# Patient Record
Sex: FEMALE | Race: WHITE | NOT HISPANIC OR LATINO | Employment: UNEMPLOYED | URBAN - METROPOLITAN AREA
[De-identification: names, ages, dates, MRNs, and addresses within clinical notes are randomized per-mention and may not be internally consistent; named-entity substitution may affect disease eponyms.]

---

## 2023-03-22 LAB
ALANINE AMINOTRANSFERASE (SGPT) (U/L) IN SER/PLAS: 16 U/L (ref 7–45)
ALBUMIN (G/DL) IN SER/PLAS: 4 G/DL (ref 3.4–5)
ALKALINE PHOSPHATASE (U/L) IN SER/PLAS: 112 U/L (ref 33–136)
ANION GAP IN SER/PLAS: 14 MMOL/L (ref 10–20)
ASPARTATE AMINOTRANSFERASE (SGOT) (U/L) IN SER/PLAS: 24 U/L (ref 9–39)
BILIRUBIN TOTAL (MG/DL) IN SER/PLAS: 0.7 MG/DL (ref 0–1.2)
CALCIDIOL (25 OH VITAMIN D3) (NG/ML) IN SER/PLAS: 75 NG/ML
CALCIUM (MG/DL) IN SER/PLAS: 9.1 MG/DL (ref 8.6–10.6)
CARBON DIOXIDE, TOTAL (MMOL/L) IN SER/PLAS: 29 MMOL/L (ref 21–32)
CHLORIDE (MMOL/L) IN SER/PLAS: 100 MMOL/L (ref 98–107)
CHOLESTEROL (MG/DL) IN SER/PLAS: 196 MG/DL (ref 0–199)
CHOLESTEROL IN HDL (MG/DL) IN SER/PLAS: 60 MG/DL
CHOLESTEROL/HDL RATIO: 3.3
COBALAMIN (VITAMIN B12) (PG/ML) IN SER/PLAS: 580 PG/ML (ref 211–911)
CREATININE (MG/DL) IN SER/PLAS: 1.01 MG/DL (ref 0.5–1.05)
ERYTHROCYTE DISTRIBUTION WIDTH (RATIO) BY AUTOMATED COUNT: 16.7 % (ref 11.5–14.5)
ERYTHROCYTE MEAN CORPUSCULAR HEMOGLOBIN CONCENTRATION (G/DL) BY AUTOMATED: 31.5 G/DL (ref 32–36)
ERYTHROCYTE MEAN CORPUSCULAR VOLUME (FL) BY AUTOMATED COUNT: 107 FL (ref 80–100)
ERYTHROCYTES (10*6/UL) IN BLOOD BY AUTOMATED COUNT: 3.32 X10E12/L (ref 4–5.2)
GFR FEMALE: 54 ML/MIN/1.73M2
GLUCOSE (MG/DL) IN SER/PLAS: 68 MG/DL (ref 74–99)
HEMATOCRIT (%) IN BLOOD BY AUTOMATED COUNT: 35.5 % (ref 36–46)
HEMOGLOBIN (G/DL) IN BLOOD: 11.2 G/DL (ref 12–16)
IRON (UG/DL) IN SER/PLAS: 61 UG/DL (ref 35–150)
IRON BINDING CAPACITY (UG/DL) IN SER/PLAS: 253 UG/DL (ref 240–445)
IRON SATURATION (%) IN SER/PLAS: 24 % (ref 25–45)
LDL: 112 MG/DL (ref 0–99)
LEUKOCYTES (10*3/UL) IN BLOOD BY AUTOMATED COUNT: 4.3 X10E9/L (ref 4.4–11.3)
NRBC (PER 100 WBCS) BY AUTOMATED COUNT: 0 /100 WBC (ref 0–0)
PLATELETS (10*3/UL) IN BLOOD AUTOMATED COUNT: 250 X10E9/L (ref 150–450)
POTASSIUM (MMOL/L) IN SER/PLAS: 4.2 MMOL/L (ref 3.5–5.3)
PROTEIN TOTAL: 6.2 G/DL (ref 6.4–8.2)
SODIUM (MMOL/L) IN SER/PLAS: 139 MMOL/L (ref 136–145)
THYROTROPIN (MIU/L) IN SER/PLAS BY DETECTION LIMIT <= 0.05 MIU/L: 1.62 MIU/L (ref 0.44–3.98)
THYROXINE (T4) FREE (NG/DL) IN SER/PLAS: 1.37 NG/DL (ref 0.78–1.48)
TRIGLYCERIDE (MG/DL) IN SER/PLAS: 121 MG/DL (ref 0–149)
URATE (MG/DL) IN SER/PLAS: 4.7 MG/DL (ref 2.3–6.7)
UREA NITROGEN (MG/DL) IN SER/PLAS: 29 MG/DL (ref 6–23)
VLDL: 24 MG/DL (ref 0–40)

## 2023-03-24 LAB
ANTI-NUCLEAR ANTIBODY (ANA): NEGATIVE
RHEUMATOID FACTOR (IU/ML) IN SERUM OR PLASMA: 36 IU/ML (ref 0–15)
SEDIMENTATION RATE, ERYTHROCYTE: 38 MM/H (ref 0–30)
URATE (MG/DL) IN SER/PLAS: 4.7 MG/DL (ref 2.3–6.7)

## 2023-05-18 ENCOUNTER — HOSPITAL ENCOUNTER (OUTPATIENT)
Dept: DATA CONVERSION | Facility: HOSPITAL | Age: 88
End: 2023-05-18
Attending: ORTHOPAEDIC SURGERY | Admitting: ORTHOPAEDIC SURGERY
Payer: MEDICARE

## 2023-05-18 DIAGNOSIS — D63.1 ANEMIA IN CHRONIC KIDNEY DISEASE (CODE): ICD-10-CM

## 2023-05-18 DIAGNOSIS — Z85.3 PERSONAL HISTORY OF MALIGNANT NEOPLASM OF BREAST: ICD-10-CM

## 2023-05-18 DIAGNOSIS — N18.30 CHRONIC KIDNEY DISEASE, STAGE 3 UNSPECIFIED (MULTI): ICD-10-CM

## 2023-05-18 DIAGNOSIS — Z88.0 ALLERGY STATUS TO PENICILLIN: ICD-10-CM

## 2023-05-18 DIAGNOSIS — M21.071 VALGUS DEFORMITY, NOT ELSEWHERE CLASSIFIED, RIGHT ANKLE: ICD-10-CM

## 2023-05-18 DIAGNOSIS — M06.9 RHEUMATOID ARTHRITIS, UNSPECIFIED (MULTI): ICD-10-CM

## 2023-05-18 DIAGNOSIS — M19.071 PRIMARY OSTEOARTHRITIS, RIGHT ANKLE AND FOOT: ICD-10-CM

## 2023-05-18 DIAGNOSIS — E03.9 HYPOTHYROIDISM, UNSPECIFIED: ICD-10-CM

## 2023-05-18 DIAGNOSIS — M20.5X9 OTHER DEFORMITIES OF TOE(S) (ACQUIRED), UNSPECIFIED FOOT: ICD-10-CM

## 2023-05-18 DIAGNOSIS — I12.9 HYPERTENSIVE CHRONIC KIDNEY DISEASE WITH STAGE 1 THROUGH STAGE 4 CHRONIC KIDNEY DISEASE, OR UNSPECIFIED CHRONIC KIDNEY DISEASE: ICD-10-CM

## 2023-05-18 DIAGNOSIS — Z79.82 LONG TERM (CURRENT) USE OF ASPIRIN: ICD-10-CM

## 2023-05-18 DIAGNOSIS — K21.9 GASTRO-ESOPHAGEAL REFLUX DISEASE WITHOUT ESOPHAGITIS: ICD-10-CM

## 2023-07-27 LAB
ANION GAP IN SER/PLAS: 11 MMOL/L (ref 10–20)
C REACTIVE PROTEIN (MG/L) IN SER/PLAS: 1.01 MG/DL
CALCIUM (MG/DL) IN SER/PLAS: 9.3 MG/DL (ref 8.6–10.6)
CARBON DIOXIDE, TOTAL (MMOL/L) IN SER/PLAS: 27 MMOL/L (ref 21–32)
CHLORIDE (MMOL/L) IN SER/PLAS: 99 MMOL/L (ref 98–107)
CREATININE (MG/DL) IN SER/PLAS: 0.96 MG/DL (ref 0.5–1.05)
ERYTHROCYTE DISTRIBUTION WIDTH (RATIO) BY AUTOMATED COUNT: 16.6 % (ref 11.5–14.5)
ERYTHROCYTE MEAN CORPUSCULAR HEMOGLOBIN CONCENTRATION (G/DL) BY AUTOMATED: 33.2 G/DL (ref 32–36)
ERYTHROCYTE MEAN CORPUSCULAR VOLUME (FL) BY AUTOMATED COUNT: 103 FL (ref 80–100)
ERYTHROCYTES (10*6/UL) IN BLOOD BY AUTOMATED COUNT: 3.52 X10E12/L (ref 4–5.2)
GFR FEMALE: 57 ML/MIN/1.73M2
GLUCOSE (MG/DL) IN SER/PLAS: 86 MG/DL (ref 74–99)
HEMATOCRIT (%) IN BLOOD BY AUTOMATED COUNT: 36.4 % (ref 36–46)
HEMOGLOBIN (G/DL) IN BLOOD: 12.1 G/DL (ref 12–16)
IRON (UG/DL) IN SER/PLAS: 38 UG/DL (ref 35–150)
IRON BINDING CAPACITY (UG/DL) IN SER/PLAS: 314 UG/DL (ref 240–445)
IRON SATURATION (%) IN SER/PLAS: 12 % (ref 25–45)
LEUKOCYTES (10*3/UL) IN BLOOD BY AUTOMATED COUNT: 6.5 X10E9/L (ref 4.4–11.3)
NRBC (PER 100 WBCS) BY AUTOMATED COUNT: 0 /100 WBC (ref 0–0)
PLATELETS (10*3/UL) IN BLOOD AUTOMATED COUNT: 225 X10E9/L (ref 150–450)
POTASSIUM (MMOL/L) IN SER/PLAS: 4.4 MMOL/L (ref 3.5–5.3)
RHEUMATOID FACTOR (IU/ML) IN SERUM OR PLASMA: 40 IU/ML (ref 0–15)
SEDIMENTATION RATE, ERYTHROCYTE: 46 MM/H (ref 0–30)
SODIUM (MMOL/L) IN SER/PLAS: 133 MMOL/L (ref 136–145)
UREA NITROGEN (MG/DL) IN SER/PLAS: 34 MG/DL (ref 6–23)

## 2023-09-07 VITALS — BODY MASS INDEX: 26.42 KG/M2 | HEIGHT: 64 IN | WEIGHT: 154.76 LBS

## 2023-09-27 PROBLEM — M79.3 LIPODERMATOSCLEROSIS OF BOTH LOWER EXTREMITIES: Status: ACTIVE | Noted: 2023-09-27

## 2023-09-27 PROBLEM — I87.2 VENOUS INSUFFICIENCY: Status: ACTIVE | Noted: 2023-09-27

## 2023-09-27 LAB
ANION GAP IN SER/PLAS: 15 MMOL/L (ref 10–20)
CALCIDIOL (25 OH VITAMIN D3) (NG/ML) IN SER/PLAS: 62 NG/ML
CALCIUM (MG/DL) IN SER/PLAS: 9.2 MG/DL (ref 8.6–10.6)
CARBON DIOXIDE, TOTAL (MMOL/L) IN SER/PLAS: 23 MMOL/L (ref 21–32)
CHLORIDE (MMOL/L) IN SER/PLAS: 101 MMOL/L (ref 98–107)
COBALAMIN (VITAMIN B12) (PG/ML) IN SER/PLAS: 465 PG/ML (ref 211–911)
CREATININE (MG/DL) IN SER/PLAS: 0.99 MG/DL (ref 0.5–1.05)
FOLATE (NG/ML) IN SER/PLAS: 13.5 NG/ML
GFR FEMALE: 55 ML/MIN/1.73M2
GLUCOSE (MG/DL) IN SER/PLAS: 119 MG/DL (ref 74–99)
IRON (UG/DL) IN SER/PLAS: 62 UG/DL (ref 35–150)
IRON BINDING CAPACITY (UG/DL) IN SER/PLAS: 307 UG/DL (ref 240–445)
IRON SATURATION (%) IN SER/PLAS: 20 % (ref 25–45)
POTASSIUM (MMOL/L) IN SER/PLAS: 4.1 MMOL/L (ref 3.5–5.3)
SODIUM (MMOL/L) IN SER/PLAS: 135 MMOL/L (ref 136–145)
UREA NITROGEN (MG/DL) IN SER/PLAS: 44 MG/DL (ref 6–23)

## 2023-09-27 RX ORDER — OMEPRAZOLE 20 MG/1
20 CAPSULE, DELAYED RELEASE ORAL
COMMUNITY
End: 2024-03-05 | Stop reason: SDUPTHER

## 2023-09-27 RX ORDER — TORSEMIDE 10 MG/1
20 TABLET ORAL 2 TIMES DAILY
COMMUNITY
End: 2024-03-05 | Stop reason: SDUPTHER

## 2023-09-27 RX ORDER — GABAPENTIN 300 MG/1
300 CAPSULE ORAL
COMMUNITY
End: 2024-03-05 | Stop reason: SDUPTHER

## 2023-09-27 RX ORDER — ALLOPURINOL 100 MG/1
200 TABLET ORAL DAILY
COMMUNITY
End: 2024-04-09 | Stop reason: SDUPTHER

## 2023-09-27 RX ORDER — POTASSIUM CHLORIDE 1500 MG/1
20 TABLET, EXTENDED RELEASE ORAL DAILY
COMMUNITY
Start: 2023-03-29

## 2023-09-27 RX ORDER — TRAMADOL HYDROCHLORIDE 50 MG/1
50 TABLET ORAL EVERY 6 HOURS PRN
COMMUNITY
Start: 2016-06-03 | End: 2024-03-06 | Stop reason: ALTCHOICE

## 2023-09-27 RX ORDER — DIAZEPAM 5 MG/1
5 TABLET ORAL NIGHTLY PRN
COMMUNITY
Start: 2013-04-02

## 2023-09-30 NOTE — H&P
History & Physical Reviewed:   I have reviewed the History and Physical dated:  03-May-2023   History and Physical reviewed and relevant findings noted. Patient examined to review pertinent physical  findings.: No significant changes   Home Medications Reviewed: no changes noted   Allergies Reviewed: no changes noted       ERAS (Enhanced Recovery After Surgery):  ·  ERAS Patient: no     Consent:   COVID-19 Consent:  ·  COVID-19 Risk Consent Surgeon has reviewed key risks related to the risk of abdulkadir COVID-19 and if they contract COVID-19 what the risks are.       Electronic Signatures:  Dustin Escoto)  (Signed 17-May-2023 13:04)   Authored: History & Physical Reviewed, ERAS, Consent,  Note Completion      Last Updated: 17-May-2023 13:04 by Dustin Escoto)

## 2023-10-02 NOTE — OP NOTE
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 Right great toe IP fusion  2. Right foot PIP fusion toes 2,3,4  3. Right foot MTP capsulotomy/extensor lengthening toes 2,3,4  4.   5.   Surgeon: Arron   Resident/Fellow/Other Assistant: Matthew   Anesthesia: ga and regional   Estimated Blood Loss (mL): none   Specimen: no   Findings: see dx     Operative Report Dictated:  Dictation: not applicable - note contains Operative  Report   Operative Report:    Preop DX: Posttraumatic arthrosis/deformity right great toe IP joint; right foot second, third, fourth claw toe  Postop DX: Same  Procedure: 1.  Right great toe IP fusion 2.  Right foot second, third, fourth PIP fusion.3.  Right foot second, third, fourth MTP arthroplasty/extensor lengthening.  Surgeon: Dustin Escoto MD  Asst: Juan Carlos Castro DO  Anesthesia: General and regional  Clinical Note: 88-year-old female with symptomatic right great toe deformity and arthrosis status post fracture of the proximal phalanx.  Here for corrective fusion of the IP joint and correction of lesser claw toes.  Understood the risk of surge including  bleeding, infection, nonunion, malunion, possible need for further surgery or shoewear modifications.  Studies which wished to proceed.  Procedure Note: Patient brought to operating room after regional anesthesia.  Timeout performed.  Antibiotics given IV.  General anesthesia given.  Right leg was prepped draped Dickel sterile fashion tourniquet on the calf.  Foot was exsanguinated tourniquet  inflated to 225 mmHg.  Z incision was made over the IP joint the right great toe.  Skin was elevated off the extensor lester.  Dorsal extensor lester was excised off the IP joint.  Collateral ligaments were released.  Medial wedge resection of the distal  aspect of the proximal phalanx performed to correct the valgus deformity.  The base of the distal phalanx were  resected to good bleeding bone.  Bone was temporarily pinned in neutral position good alignment by C-arm images.  The pin was measured drilled  and a 4.0 headless screw was placed retrograde from the distal phalanx into the proximal phalanx with good fixation.  Good alignment by C-arm images.  Wounds irrigated.  Extensor tendon repaired over the joint with 0 Vicryl suture.  The skin incision  was closed with nylon suture.  Attention was turned the lesser toes.  Same procedure was performed on second, third and fourth toe.  Skin and extensor lester excised off the PIP joint dorsally.  Collateral ligaments were released.  Distal aspect the proximal  phalanx were resected.  The base of the middle phalanx was rongeured.  A long flexor release to the plantar aspect.  The toes were pinned in neutral position with the 0.62 K wire.  There is still slight extension of the MTP joints.  Percutaneous extensor  tenotomy and dorsal capsule release performed on the second third and fourth MTP joints.  The 0.62 K wires were then driven into the corresponding metatarsal heads to maintain good alignment.  These incisions were closed with nylon sutures.  Wounds washed  and dried.  Dressed with Xeroform fluffs and a soft bulky dressing.          Electronic Signatures:  Dustin Escoto)  (Signed 18-May-2023 09:47)   Authored: Post Operative Note, Note Completion      Last Updated: 18-May-2023 09:47 by Dustin Escoto)

## 2023-11-29 ENCOUNTER — LAB (OUTPATIENT)
Dept: LAB | Facility: LAB | Age: 88
End: 2023-11-29
Payer: MEDICARE

## 2023-11-29 DIAGNOSIS — D50.9 IRON DEFICIENCY ANEMIA, UNSPECIFIED IRON DEFICIENCY ANEMIA TYPE: ICD-10-CM

## 2023-11-29 DIAGNOSIS — M05.9 RHEUMATOID ARTHRITIS WITH POSITIVE RHEUMATOID FACTOR, INVOLVING UNSPECIFIED SITE (MULTI): ICD-10-CM

## 2023-11-29 DIAGNOSIS — E87.1 LOW SODIUM LEVELS: ICD-10-CM

## 2023-11-29 LAB
ANION GAP SERPL CALC-SCNC: 13 MMOL/L (ref 10–20)
BASOPHILS # BLD AUTO: 0.05 X10*3/UL (ref 0–0.1)
BASOPHILS NFR BLD AUTO: 1 %
BUN SERPL-MCNC: 36 MG/DL (ref 6–23)
CALCIUM SERPL-MCNC: 9.2 MG/DL (ref 8.6–10.6)
CHLORIDE SERPL-SCNC: 102 MMOL/L (ref 98–107)
CO2 SERPL-SCNC: 27 MMOL/L (ref 21–32)
CREAT SERPL-MCNC: 1.01 MG/DL (ref 0.5–1.05)
EOSINOPHIL # BLD AUTO: 0.1 X10*3/UL (ref 0–0.4)
EOSINOPHIL NFR BLD AUTO: 2.1 %
ERYTHROCYTE [DISTWIDTH] IN BLOOD BY AUTOMATED COUNT: 15.4 % (ref 11.5–14.5)
ERYTHROCYTE [SEDIMENTATION RATE] IN BLOOD BY WESTERGREN METHOD: 32 MM/H (ref 0–30)
FOLATE SERPL-MCNC: 13.6 NG/ML
GFR SERPL CREATININE-BSD FRML MDRD: 54 ML/MIN/1.73M*2
GLUCOSE SERPL-MCNC: 102 MG/DL (ref 74–99)
HCT VFR BLD AUTO: 33.8 % (ref 36–46)
HGB BLD-MCNC: 11.2 G/DL (ref 12–16)
IMM GRANULOCYTES # BLD AUTO: 0.02 X10*3/UL (ref 0–0.5)
IMM GRANULOCYTES NFR BLD AUTO: 0.4 % (ref 0–0.9)
IRON SATN MFR SERPL: 19 % (ref 25–45)
IRON SERPL-MCNC: 54 UG/DL (ref 35–150)
LYMPHOCYTES # BLD AUTO: 1.13 X10*3/UL (ref 0.8–3)
LYMPHOCYTES NFR BLD AUTO: 23.5 %
MCH RBC QN AUTO: 35 PG (ref 26–34)
MCHC RBC AUTO-ENTMCNC: 33.1 G/DL (ref 32–36)
MCV RBC AUTO: 106 FL (ref 80–100)
MONOCYTES # BLD AUTO: 0.62 X10*3/UL (ref 0.05–0.8)
MONOCYTES NFR BLD AUTO: 12.9 %
NEUTROPHILS # BLD AUTO: 2.88 X10*3/UL (ref 1.6–5.5)
NEUTROPHILS NFR BLD AUTO: 60.1 %
NRBC BLD-RTO: 0 /100 WBCS (ref 0–0)
PLATELET # BLD AUTO: 175 X10*3/UL (ref 150–450)
POTASSIUM SERPL-SCNC: 4.1 MMOL/L (ref 3.5–5.3)
RBC # BLD AUTO: 3.2 X10*6/UL (ref 4–5.2)
RHEUMATOID FACT SER NEPH-ACNC: 36 IU/ML (ref 0–15)
SODIUM SERPL-SCNC: 138 MMOL/L (ref 136–145)
TIBC SERPL-MCNC: 291 UG/DL (ref 240–445)
UIBC SERPL-MCNC: 237 UG/DL (ref 110–370)
WBC # BLD AUTO: 4.8 X10*3/UL (ref 4.4–11.3)

## 2023-11-29 PROCEDURE — 86431 RHEUMATOID FACTOR QUANT: CPT

## 2023-11-29 PROCEDURE — 83540 ASSAY OF IRON: CPT

## 2023-11-29 PROCEDURE — 82746 ASSAY OF FOLIC ACID SERUM: CPT

## 2023-11-29 PROCEDURE — 85652 RBC SED RATE AUTOMATED: CPT

## 2023-11-29 PROCEDURE — 80048 BASIC METABOLIC PNL TOTAL CA: CPT

## 2023-11-29 PROCEDURE — 85025 COMPLETE CBC W/AUTO DIFF WBC: CPT

## 2023-11-29 PROCEDURE — 83550 IRON BINDING TEST: CPT

## 2023-11-29 PROCEDURE — 36415 COLL VENOUS BLD VENIPUNCTURE: CPT

## 2023-12-04 ENCOUNTER — OFFICE VISIT (OUTPATIENT)
Dept: ORTHOPEDIC SURGERY | Facility: CLINIC | Age: 88
End: 2023-12-04
Payer: MEDICARE

## 2023-12-04 DIAGNOSIS — M65.322 ACQUIRED TRIGGER FINGER OF LEFT INDEX FINGER: ICD-10-CM

## 2023-12-04 DIAGNOSIS — M19.049 CMC ARTHRITIS: Primary | ICD-10-CM

## 2023-12-04 PROCEDURE — 1126F AMNT PAIN NOTED NONE PRSNT: CPT | Performed by: ORTHOPAEDIC SURGERY

## 2023-12-04 PROCEDURE — 99213 OFFICE O/P EST LOW 20 MIN: CPT | Performed by: ORTHOPAEDIC SURGERY

## 2023-12-04 PROCEDURE — 20550 NJX 1 TENDON SHEATH/LIGAMENT: CPT | Performed by: ORTHOPAEDIC SURGERY

## 2023-12-04 PROCEDURE — 20600 DRAIN/INJ JOINT/BURSA W/O US: CPT | Performed by: ORTHOPAEDIC SURGERY

## 2023-12-04 RX ORDER — LIDOCAINE HYDROCHLORIDE 10 MG/ML
0.5 INJECTION INFILTRATION; PERINEURAL
Status: COMPLETED | OUTPATIENT
Start: 2023-12-04 | End: 2023-12-04

## 2023-12-04 RX ORDER — TRIAMCINOLONE ACETONIDE 40 MG/ML
20 INJECTION, SUSPENSION INTRA-ARTICULAR; INTRAMUSCULAR
Status: COMPLETED | OUTPATIENT
Start: 2023-12-04 | End: 2023-12-04

## 2023-12-04 RX ADMIN — LIDOCAINE HYDROCHLORIDE 0.5 ML: 10 INJECTION INFILTRATION; PERINEURAL at 19:27

## 2023-12-04 RX ADMIN — LIDOCAINE HYDROCHLORIDE 0.5 ML: 10 INJECTION INFILTRATION; PERINEURAL at 19:28

## 2023-12-04 RX ADMIN — TRIAMCINOLONE ACETONIDE 20 MG: 40 INJECTION, SUSPENSION INTRA-ARTICULAR; INTRAMUSCULAR at 19:28

## 2023-12-04 RX ADMIN — TRIAMCINOLONE ACETONIDE 20 MG: 40 INJECTION, SUSPENSION INTRA-ARTICULAR; INTRAMUSCULAR at 19:27

## 2023-12-05 NOTE — PROGRESS NOTES
Michell presents today for new problem.  I last saw her in October 2022 for a trigger finger of her left middle finger.  She reports that she had a foot surgery in May which exacerbated her underlying rheumatoid arthritis.  Her primary complaint today is of pain and a bony prominence at the base of the left thumb.  She also has developed progressively symptomatic trigger fingers.  This past summer her right ring finger trigger finger was very bad but seem to have improved spontaneously and is now very tolerable.  However she has developed more significant triggering of the left hand primarily the index finger and to a lesser degree the middle finger.  She has a history of rheumatoid and osteoarthritis.  She has seen occupational therapy before who recommended arthritic gloves that she wears at nighttime while sleeping which she thinks have been helpful.  She would like to try injections today but wants to limit how many steroid she is getting because of reactions that she has had to too many steroids in the past.    Past medical history, medications, allergies, surgical history and review of systems have been reviewed with the patient. Pertinent changes are documented in the HPI. Otherwise they are unchanged when compared to last visit on October 10, 2022.    Physical Examination Findings:  Constitutional: Appears well-developed and well-nourished.  Head: Normocephalic and atraumatic.  Eyes: Pupils are equal and round.  Cardiovascular: Intact distal pulses.   Respiratory: Effort normal. No respiratory distress.  Neurologic: Alert and oriented to person, place, and time.  Skin: Skin is warm and dry.  Hematologic / Lymphatic: No lymphedema, lymphangitis.  Psychiatric: normal mood and affect. Behavior is normal.   Musculoskeletal: Bilateral hand examination reveals moderate diffuse degenerative changes.  She has triggering of her right ring finger with tenderness at the A1 pulley but no locking and no IP joint  contracture.  On the left hand she has a bony prominence at the thumb CMC joint.  She has subluxation of the CMC joint with a mild adduction contracture.  No significant MP joint hyperextension stability.  Tenderness to palpation index and middle finger A1 pulleys with triggering of these 2 digits.  The index finger triggering is more painful.  No appreciable rheumatoid nodules today.  No appreciable joint deformities other than the thumb CMC joint.    Impression: Left thumb CMC arthritis, multiple trigger fingers bilateral hands.    Plan: She has indicated that her left thumb and left index finger are the most bothersome and she has requested injections for these today.    S Inj/Asp: L thumb CMC on 12/4/2023 7:27 PM  Indications: pain  Details: 25 G needle, dorsal approach  Medications: 20 mg triamcinolone acetonide 40 mg/mL; 0.5 mL lidocaine 10 mg/mL (1 %)  Outcome: tolerated well, no immediate complications  Procedure, treatment alternatives, risks and benefits explained, specific risks discussed. Consent was given by the patient. Immediately prior to procedure a time out was called to verify the correct patient, procedure, equipment, support staff and site/side marked as required. Patient was prepped and draped in the usual sterile fashion.       Hand / UE Inj/Asp: L index A1 for trigger finger on 12/4/2023 7:28 PM  Indications: tendon swelling and pain  Details: 25 G needle, volar approach  Medications: 20 mg triamcinolone acetonide 40 mg/mL; 0.5 mL lidocaine 10 mg/mL (1 %)  Outcome: tolerated well, no immediate complications  Procedure, treatment alternatives, risks and benefits explained, specific risks discussed. Consent was given by the patient. Immediately prior to procedure a time out was called to verify the correct patient, procedure, equipment, support staff and site/side marked as required. Patient was prepped and draped in the usual sterile fashion.         Return as needed for recurrence or  progression of problems .    Alejandro Gunderson MD    [facility] [employer]  Department of Orthopaedic Surgery  Chief of Hand and Upper Extremity Surgery  [facility]    Dictation performed with the use of voice recognition software. Syntax and grammatical errors may exist.

## 2023-12-28 ENCOUNTER — APPOINTMENT (OUTPATIENT)
Dept: OBSTETRICS AND GYNECOLOGY | Facility: CLINIC | Age: 88
End: 2023-12-28
Payer: MEDICARE

## 2024-01-05 ENCOUNTER — APPOINTMENT (OUTPATIENT)
Dept: ORTHOPEDIC SURGERY | Facility: CLINIC | Age: 89
End: 2024-01-05
Payer: MEDICARE

## 2024-01-18 ENCOUNTER — OFFICE VISIT (OUTPATIENT)
Dept: OBSTETRICS AND GYNECOLOGY | Facility: CLINIC | Age: 89
End: 2024-01-18
Payer: MEDICARE

## 2024-01-18 DIAGNOSIS — N39.0 RECURRENT UTI (URINARY TRACT INFECTION): Primary | ICD-10-CM

## 2024-01-18 PROCEDURE — 1126F AMNT PAIN NOTED NONE PRSNT: CPT | Performed by: OBSTETRICS & GYNECOLOGY

## 2024-01-18 PROCEDURE — 99214 OFFICE O/P EST MOD 30 MIN: CPT | Performed by: OBSTETRICS & GYNECOLOGY

## 2024-01-18 RX ORDER — LEVOTHYROXINE SODIUM 50 UG/1
50 TABLET ORAL
COMMUNITY
End: 2024-04-05 | Stop reason: WASHOUT

## 2024-01-18 NOTE — PROGRESS NOTES
Urogynecology  Provider:  Alexa Gross MD  [phone]              ASSESSMENT AND PLAN:     Problem List Items Addressed This Visit    None          I spent a total of eConsult Time: 30 minutes in face to face and non face to face time.        Alexa Gross MD  HISTORY OF PRESENT ILLNESS    Michell Max a 88 y.o. 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 Authorizing Provider   allopurinol (Zyloprim) 100 mg tablet Take 1 tablet (100 mg) by mouth 2 times a day.    Historical Provider, MD   diazePAM (Valium) 5 mg tablet Take 1 tablet (5 mg) by mouth as needed at bedtime. 4/2/13   Historical Provider, MD   gabapentin (Neurontin) 300 mg capsule Take 1 capsule (300 mg) by mouth.    Historical Provider, MD   omeprazole (PriLOSEC) 20 mg DR capsule Take 1 capsule (20 mg) by mouth once daily in the morning. Take before meals.    Historical Provider, MD   potassium chloride CR 20 mEq ER tablet Take 1 tablet (20 mEq) by  "mouth once daily. 3/29/23   Historical Provider, MD   torsemide (Demadex) 10 mg tablet Take 2 tablets (20 mg) by mouth 2 times a day.    Historical Provider, MD   traMADol (Ultram) 50 mg tablet Take 1 tablet (50 mg) by mouth every 6 hours if needed. 6/3/16   Historical Provider, MD BULLARD  Review of Systems     Blood, Urine   Date Value Ref Range Status   02/14/2023 SMALL (1+) (A) NEGATIVE Final     Nitrite, Urine   Date Value Ref Range Status   02/14/2023 NEGATIVE NEGATIVE Final     Urobilinogen, Urine   Date Value Ref Range Status   02/14/2023 <2.0 0.0 - 1.9 mg/dL Final         PHYSICAL EXAM:      There were no vitals taken for this visit.     No LMP recorded.      Declines chaperone for physical exam.      Well developed, well nourished, in no apparent distress.   Neurologic/Psychiatric:  Awake, Alert and Oriented times 3.  Affect normal.     GENITAL/URINARY:       External Genitalia:  The patient has normal appearing external genitalia, normal skenes and bartholins glands, and a normal hair distribution.  Her vulva is without lesions, erythema or discharge.  It is non-tender with appropriate sensation.     Urethral Meatus:  Size normal, Location normal, Lesions absent, Prolapse absent,      Urethra:  Fullness absent, Masses absent,      Bladder:  Fullness absent, Masses absent, Tenderness absent,      Vagina:  General appearance normal, Estrogen effect normal, Discharge absent, Lesions absent     Cervix: Normal, no discharge.   Uterus:  surgically absent  Adnexa: normal     Anus/Perineum:  Lesions absent and Masses absent normal sphincter tone, no lesions  Normal Perineum    POP-Q:  Aa: -1       Ba:  C: -8   Gh:  Pb:  TVL: 9         Ap: -3 Bp:  D: x           No results found for: \"URINECULTURE\"   Lab Results   Component Value Date    GLUCOSE 102 (H) 11/29/2023    CALCIUM 9.2 11/29/2023     11/29/2023    K 4.1 11/29/2023    CO2 27 11/29/2023     11/29/2023    BUN 36 (H) 11/29/2023    CREATININE " 1.01 11/29/2023     Lab Results   Component Value Date    WBC 4.8 11/29/2023    HGB 11.2 (L) 11/29/2023    HCT 33.8 (L) 11/29/2023     (H) 11/29/2023     11/29/2023      ASSESSMENT &PLAN     Assessment:   88 y.o. old female being assessed for Cystocele and Jennifer's     Diagnoses:   #1 Cystocele  #2 Recurrent UTI     Plan:   1. Recurrent UTI  - Continue use of TV estrogen cream every 10 days      2. Cystocele  - Minimal prolapse, reassured patient  - Prolapse stable and asymptomatic  - We will continue to monitor      Follow-up in September 2024 with Dr. Renato Alonzo Attestation  By signing my name below, I, Nikki Edmonds , Scr[place]   attest that this documentation has been prepared under the direction and in the presence of Alexa Gross MD.     Alexa Gross MD      Overall doing well. No scute issues.  I Dr. Gross, personally performed the services described in the documentation which was scribed virtually and confirm it is both complete and accurate.  Alexa Gross MD

## 2024-02-23 ENCOUNTER — APPOINTMENT (OUTPATIENT)
Dept: ORTHOPEDIC SURGERY | Facility: CLINIC | Age: 89
End: 2024-02-23
Payer: MEDICARE

## 2024-03-01 ENCOUNTER — APPOINTMENT (OUTPATIENT)
Dept: ORTHOPEDIC SURGERY | Facility: CLINIC | Age: 89
End: 2024-03-01
Payer: MEDICARE

## 2024-03-05 DIAGNOSIS — R52 PAIN: Primary | ICD-10-CM

## 2024-03-05 DIAGNOSIS — K21.9 GASTROESOPHAGEAL REFLUX DISEASE, UNSPECIFIED WHETHER ESOPHAGITIS PRESENT: ICD-10-CM

## 2024-03-05 RX ORDER — GABAPENTIN 300 MG/1
300 CAPSULE ORAL NIGHTLY
Qty: 90 CAPSULE | Refills: 3 | Status: SHIPPED | OUTPATIENT
Start: 2024-03-05

## 2024-03-05 RX ORDER — TORSEMIDE 10 MG/1
20 TABLET ORAL 2 TIMES DAILY
Qty: 180 TABLET | Refills: 3 | Status: SHIPPED | OUTPATIENT
Start: 2024-03-05

## 2024-03-05 RX ORDER — OMEPRAZOLE 20 MG/1
20 CAPSULE, DELAYED RELEASE ORAL
Qty: 90 CAPSULE | Refills: 3 | Status: SHIPPED | OUTPATIENT
Start: 2024-03-05

## 2024-03-06 ENCOUNTER — OFFICE VISIT (OUTPATIENT)
Dept: CARDIOLOGY | Facility: CLINIC | Age: 89
End: 2024-03-06
Payer: MEDICARE

## 2024-03-06 VITALS
HEART RATE: 100 BPM | SYSTOLIC BLOOD PRESSURE: 138 MMHG | WEIGHT: 149 LBS | DIASTOLIC BLOOD PRESSURE: 72 MMHG | RESPIRATION RATE: 16 BRPM | BODY MASS INDEX: 25.44 KG/M2 | HEIGHT: 64 IN

## 2024-03-06 DIAGNOSIS — I87.2 VENOUS INSUFFICIENCY: Primary | ICD-10-CM

## 2024-03-06 PROCEDURE — 99213 OFFICE O/P EST LOW 20 MIN: CPT | Performed by: INTERNAL MEDICINE

## 2024-03-06 PROCEDURE — 1157F ADVNC CARE PLAN IN RCRD: CPT | Performed by: INTERNAL MEDICINE

## 2024-03-06 PROCEDURE — 1036F TOBACCO NON-USER: CPT | Performed by: INTERNAL MEDICINE

## 2024-03-06 PROCEDURE — 1159F MED LIST DOCD IN RCRD: CPT | Performed by: INTERNAL MEDICINE

## 2024-03-06 PROCEDURE — 1160F RVW MEDS BY RX/DR IN RCRD: CPT | Performed by: INTERNAL MEDICINE

## 2024-03-06 PROCEDURE — 1126F AMNT PAIN NOTED NONE PRSNT: CPT | Performed by: INTERNAL MEDICINE

## 2024-03-06 RX ORDER — ERGOCALCIFEROL 1.25 MG/1
1.25 CAPSULE ORAL
COMMUNITY
End: 2024-04-09 | Stop reason: SDUPTHER

## 2024-03-06 RX ORDER — FERROUS SULFATE, DRIED 160(50) MG
1 TABLET, EXTENDED RELEASE ORAL DAILY
COMMUNITY

## 2024-03-06 RX ORDER — METHOTREXATE 2.5 MG/1
17.5 TABLET ORAL
COMMUNITY
End: 2024-04-09 | Stop reason: SDUPTHER

## 2024-03-06 RX ORDER — FERROUS SULFATE 325(65) MG
65 TABLET, DELAYED RELEASE (ENTERIC COATED) ORAL
COMMUNITY

## 2024-03-06 RX ORDER — LUTEIN 6 MG
1 TABLET ORAL DAILY
COMMUNITY

## 2024-03-06 RX ORDER — ACETAMINOPHEN 500 MG
1000 TABLET ORAL DAILY
COMMUNITY

## 2024-03-06 ASSESSMENT — PAIN SCALES - GENERAL: PAINLEVEL: 0-NO PAIN

## 2024-03-06 ASSESSMENT — PATIENT HEALTH QUESTIONNAIRE - PHQ9
1. LITTLE INTEREST OR PLEASURE IN DOING THINGS: NOT AT ALL
2. FEELING DOWN, DEPRESSED OR HOPELESS: NOT AT ALL
SUM OF ALL RESPONSES TO PHQ9 QUESTIONS 1 AND 2: 0

## 2024-03-06 ASSESSMENT — COLUMBIA-SUICIDE SEVERITY RATING SCALE - C-SSRS
2. HAVE YOU ACTUALLY HAD ANY THOUGHTS OF KILLING YOURSELF?: NO
1. IN THE PAST MONTH, HAVE YOU WISHED YOU WERE DEAD OR WISHED YOU COULD GO TO SLEEP AND NOT WAKE UP?: NO
6. HAVE YOU EVER DONE ANYTHING, STARTED TO DO ANYTHING, OR PREPARED TO DO ANYTHING TO END YOUR LIFE?: NO

## 2024-03-06 NOTE — PROGRESS NOTES
OUTPATIENT FOLLOW-UP -  VASCULAR MEDICINE    DOS:  3/6/24  Last seen:    8/30/23    REQUESTING PHYSICIAN:   Dr. Zack Nieves, PCP    REASON FOR FOLLOW-UP:  Here for follow up CVI and LDS    HISTORY OF PRESENT ILLNESS:     87 yo lady here for follow up CVI and LDS. Reports using the compression. Used the tubigrip/edema wear for a while but doesn't need it now. When las seen had foot and ankle surgery and was still undergoing PT. This is better now. Has ortho follow up next week. Reports has some tendonitis interfering with walking.      REVIEW OF SYSTEMS:     weight is stable  No sores, ulcers, rashes, skin lesions  + edema, no calf pain    PHYSICAL EXAMINATION:   Gen: Appears well except pale, NAD  Ext: trace brawny edema right, nontender  Skin: LDS and hemosiderin staining kush R>>L  Pulses: DP 1+  Mood and affect appropriate    ADDITIONAL DATA:  15-20mmHg compression worn  right lorena  32.0cm  left calf:  31.0cm      ASSESSMENT/PLAN:    for follow up CVI and LDS - continue the compression. Elevate at night. Walk every hour if possible. Follow up 6 months.

## 2024-03-06 NOTE — PATIENT INSTRUCTIONS
ASSESSMENT/PLAN:    for follow up CVI and LDS - continue the compression. Elevate at night. Walk every hour if possible. Follow up 6 months.

## 2024-03-13 ENCOUNTER — HOSPITAL ENCOUNTER (OUTPATIENT)
Dept: RADIOLOGY | Facility: CLINIC | Age: 89
Discharge: HOME | End: 2024-03-13
Payer: MEDICARE

## 2024-03-13 ENCOUNTER — OFFICE VISIT (OUTPATIENT)
Dept: ORTHOPEDIC SURGERY | Facility: CLINIC | Age: 89
End: 2024-03-13
Payer: MEDICARE

## 2024-03-13 VITALS — WEIGHT: 149 LBS | BODY MASS INDEX: 25.44 KG/M2 | HEIGHT: 64 IN

## 2024-03-13 DIAGNOSIS — M19.071 OSTEOARTHRITIS OF RIGHT SUBTALAR JOINT: Primary | ICD-10-CM

## 2024-03-13 DIAGNOSIS — M21.41 ACQUIRED PES PLANUS OF RIGHT FOOT: ICD-10-CM

## 2024-03-13 DIAGNOSIS — M79.671 FOOT PAIN, RIGHT: ICD-10-CM

## 2024-03-13 DIAGNOSIS — M21.6X1 ACQUIRED PRONATION DEFORMITY OF RIGHT FOOT: ICD-10-CM

## 2024-03-13 PROCEDURE — 73630 X-RAY EXAM OF FOOT: CPT | Mod: RT

## 2024-03-13 PROCEDURE — 99213 OFFICE O/P EST LOW 20 MIN: CPT | Performed by: STUDENT IN AN ORGANIZED HEALTH CARE EDUCATION/TRAINING PROGRAM

## 2024-03-13 PROCEDURE — 73610 X-RAY EXAM OF ANKLE: CPT | Mod: RIGHT SIDE | Performed by: STUDENT IN AN ORGANIZED HEALTH CARE EDUCATION/TRAINING PROGRAM

## 2024-03-13 PROCEDURE — 1157F ADVNC CARE PLAN IN RCRD: CPT | Performed by: STUDENT IN AN ORGANIZED HEALTH CARE EDUCATION/TRAINING PROGRAM

## 2024-03-13 PROCEDURE — 1125F AMNT PAIN NOTED PAIN PRSNT: CPT | Performed by: STUDENT IN AN ORGANIZED HEALTH CARE EDUCATION/TRAINING PROGRAM

## 2024-03-13 PROCEDURE — 1160F RVW MEDS BY RX/DR IN RCRD: CPT | Performed by: STUDENT IN AN ORGANIZED HEALTH CARE EDUCATION/TRAINING PROGRAM

## 2024-03-13 PROCEDURE — 73610 X-RAY EXAM OF ANKLE: CPT | Mod: RT

## 2024-03-13 PROCEDURE — 73630 X-RAY EXAM OF FOOT: CPT | Mod: RIGHT SIDE | Performed by: STUDENT IN AN ORGANIZED HEALTH CARE EDUCATION/TRAINING PROGRAM

## 2024-03-13 PROCEDURE — 1036F TOBACCO NON-USER: CPT | Performed by: STUDENT IN AN ORGANIZED HEALTH CARE EDUCATION/TRAINING PROGRAM

## 2024-03-13 PROCEDURE — 1159F MED LIST DOCD IN RCRD: CPT | Performed by: STUDENT IN AN ORGANIZED HEALTH CARE EDUCATION/TRAINING PROGRAM

## 2024-03-13 ASSESSMENT — PAIN SCALES - GENERAL: PAINLEVEL_OUTOF10: 3

## 2024-03-13 ASSESSMENT — PAIN - FUNCTIONAL ASSESSMENT: PAIN_FUNCTIONAL_ASSESSMENT: 0-10

## 2024-03-13 ASSESSMENT — PAIN DESCRIPTION - DESCRIPTORS: DESCRIPTORS: SHARP

## 2024-03-13 NOTE — PROGRESS NOTES
ORTHOPAEDIC SURGERY OUTPATIENT PROGRESS NOTE    Chief Complaint:  Right ankle pain    History Of Present Illness  Michell Medrano is a 88 y.o. female who presents for evaluation of right ankle pain.  Patient  underwent right great toe IPJ arthrodesis and claw toe corrective surgery with Dr. Escoto from May 2023.  She has done well from the surgery.  Even before this surgery and over the last year she has had pain on the outside of her foot.  She has intermittently utilized walking boots for more severe pain which has resolved in the past.  She has followed with one of our local podiatrists Dr. Dockery and continues with him for nail care.  She has had success with custom orthotics and is due for a new pair.  Patient ambulates with a walker at baseline, she is not interested in pursuing surgical management for this problem.  She describes 3 out of 10 pain that is sharp and made worse with walking and standing.  She denies any specific trauma or associated numbness, tingling weakness.  Patient has had flatfeet for as long as she can recall without obvious progression recently.  She notices that her shoes tend to wear out over time in a predictable fashion.     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    Generalized osteoarthritis       Surgical History  Recent Surgeries in Orthopaedic Surgery            No cases to display             Social History  Social History     Socioeconomic History    Marital status:      Spouse name: None    Number of children: None    Years of education: None    Highest education level: None   Occupational History    None   Tobacco Use    Smoking status: Never    Smokeless tobacco: Never   Substance and Sexual Activity    Alcohol use: None    Drug use: None    Sexual activity: None    Other Topics Concern    None   Social History Narrative    None     Social Determinants of Health     Financial Resource Strain: Not on file   Food Insecurity: Not on file   Transportation Needs: Not on file   Physical Activity: Not on file   Stress: Not on file   Social Connections: Not on file   Intimate Partner Violence: Not on file   Housing Stability: Not on file       Family History  No family history on file.     Allergies  Allergies   Allergen Reactions    Cephalosporins Unknown    Doxycycline Unknown    Erythromycin Unknown    Erythromycin Base Unknown    Methen-M.Blue-S.Phos-Phsal-Hyo Unknown     Uribel CAPS    Naproxen Unknown    Penicillins Unknown    Prednisone Unknown    Pseudoephedrine Unknown    Tetracycline Unknown       Review of Systems  REVIEW OF SYSTEMS  Constitutional: no unplanned weight loss  Psychiatric: no suicidal ideation  ENT: no vision changes, no sinus problems  Pulmonary: no shortness of breath  Lymphatic: no enlarged lymph nodes  Cardiovascular: no chest pain or shortness of breath  Gastrointestinal: no stomach problems  Genitourinary: no dysuria   Skin: no rashes  Endocrine: no thyroid problems  Neurological: no headache, no numbness  Hematological: no easy bruising  Musculoskeletal: Right foot pain     Physical Exam  PHYSICAL EXAMINATION  Constitutional Exam: well developed and well nourished  Psychiatric Exam: alert and oriented, appropriate mood and behavior  Eye Exam: EOMI  Pulmonary Exam: breathing non-labored, no apparent distress  Lymphatic exam: no appreciable lymphadenopathy in the lower extremities  Cardiovascular exam: RRR to peripheral palpation, DP pulses 2+, PT 2+, toes are pink with good capillary refill, no pitting edema  Skin exam: no open lesions, rashes, abrasions or ulcerations  Neurological exam: sensation to light touch intact in both lower extremities in peripheral and dermatomal distributions (except for any abnormalities noted in musculoskeletal  "exam)    Musculoskeletal exam: Right lower extremity examination.  Patient pain localized to the sinus Tarsi.  She is focally tender to palpation there.  She has mild tenderness to palpation overlying the superficial deltoid medially.  There is no obvious ankle effusion or tenderness to palpation along the joint line.  Patient without tenderness to palpation about the IPJ or significant tenderness to palpation about the lesser toes.  Patient has greater than physiologic hindfoot valgus with pes planus arch posture and no significant forefoot deformity noted except for claw toe and fourth curly toe with relative varus deviation.  Patient has relatively pain-free ankle ROM with painful and restricted subtalar joint range of motion and supple and pain-free midtarsal joint range of motion. Patient has sensation intact to light touch grossly in a saphenous, sural, superficial peroneal, deep peroneal and tibial nerve distribution.  Patient has 5 out of 5 strength in plantarflexion, dorsiflexion and EHL. Patient has 2+ DP/PT pulse palpated.     Last Recorded Vitals  Height 1.626 m (5' 4\"), weight 67.6 kg (149 lb).    Laboratory Results  No results found for this or any previous visit (from the past 24 hour(s)).     Radiology Results  X-ray imaging 3 view weightbearing right foot and ankle reviewed from 03/13/2024 and independently evaluated by me demonstrates obvious healing of arthrodesis of first IPJ  and sequela of prior claw toe and claw toe corrective procedure with residual deformity.  There are obvious degenerative change of the tibiotalar and subtalar joint with mild increase sclerosis of the tibial plafond.  Lateral projection suggests pes planus posture.    Assessment/Plan:  88-year-old female who my impression has right progressive collapsing foot deformity with features of pes planus posture, acquired pronation deformity and obvious subtalar osteoarthritis.  I have reviewed the diagnosis and treatment options " extensively with the patient.  In my impression the patient may continue weightbearing as tolerated in her right lower extremity.  She is planning to obtain new  in the coming weeks.  I discussed the role for bracing including [place] style brace.  The patient has previously poorly tolerated bracing due to ill fit in her shoes.  We discussed the diagnostic and empiric benefits of a right ST CSI, I will plan to see the patient back in approximately 3 months for repeat clinical evaluation.  I anticipate discussion regarding I would recommend deferring to assess her pain following obtaining new .  R ST CSI as symptoms would warrant.  Upon return, patient would not require further imaging.    Kingston Farrell MD, ROSIO  Department of Orthopaedic Surgery  [facility]    The diagnosis and treatment plan were reviewed with the patient. All questions were answered. The patient verbalized understanding of the treatment plan. There were no barriers to understanding identified.    Note dictated with MyPermissions software.  Completed without full type editing and sent to avoid delay.

## 2024-03-25 ENCOUNTER — LAB REQUISITION (OUTPATIENT)
Dept: LAB | Facility: LAB | Age: 89
End: 2024-03-25
Payer: MEDICARE

## 2024-03-25 DIAGNOSIS — M25.562 PAIN IN LEFT KNEE: ICD-10-CM

## 2024-03-25 DIAGNOSIS — D64.9 ANEMIA, UNSPECIFIED: ICD-10-CM

## 2024-03-25 DIAGNOSIS — E55.9 VITAMIN D DEFICIENCY, UNSPECIFIED: ICD-10-CM

## 2024-03-25 DIAGNOSIS — R31.9 HEMATURIA, UNSPECIFIED: ICD-10-CM

## 2024-03-25 DIAGNOSIS — M81.0 AGE-RELATED OSTEOPOROSIS WITHOUT CURRENT PATHOLOGICAL FRACTURE: ICD-10-CM

## 2024-03-25 DIAGNOSIS — I10 ESSENTIAL (PRIMARY) HYPERTENSION: ICD-10-CM

## 2024-03-25 DIAGNOSIS — M10.9 GOUT, UNSPECIFIED: ICD-10-CM

## 2024-03-25 LAB
25(OH)D3 SERPL-MCNC: 74 NG/ML (ref 30–100)
APPEARANCE UR: CLEAR
BILIRUB UR STRIP.AUTO-MCNC: NEGATIVE MG/DL
COLOR UR: COLORLESS
ERYTHROCYTE [DISTWIDTH] IN BLOOD BY AUTOMATED COUNT: 15.8 % (ref 11.5–14.5)
ERYTHROCYTE [SEDIMENTATION RATE] IN BLOOD BY WESTERGREN METHOD: 33 MM/H (ref 0–30)
FOLATE SERPL-MCNC: 10.6 NG/ML
GLUCOSE UR STRIP.AUTO-MCNC: NORMAL MG/DL
HCT VFR BLD AUTO: 35 % (ref 36–46)
HGB BLD-MCNC: 11.7 G/DL (ref 12–16)
KETONES UR STRIP.AUTO-MCNC: NEGATIVE MG/DL
LEUKOCYTE ESTERASE UR QL STRIP.AUTO: NEGATIVE
MCH RBC QN AUTO: 36.1 PG (ref 26–34)
MCHC RBC AUTO-ENTMCNC: 33.4 G/DL (ref 32–36)
MCV RBC AUTO: 108 FL (ref 80–100)
NITRITE UR QL STRIP.AUTO: NEGATIVE
NRBC BLD-RTO: 0 /100 WBCS (ref 0–0)
PH UR STRIP.AUTO: 6.5 [PH]
PLATELET # BLD AUTO: 242 X10*3/UL (ref 150–450)
PROT UR STRIP.AUTO-MCNC: NEGATIVE MG/DL
RBC # BLD AUTO: 3.24 X10*6/UL (ref 4–5.2)
RBC # UR STRIP.AUTO: NEGATIVE /UL
SP GR UR STRIP.AUTO: 1
T4 FREE SERPL-MCNC: 1.45 NG/DL (ref 0.78–1.48)
TSH SERPL-ACNC: 2.51 MIU/L (ref 0.44–3.98)
UROBILINOGEN UR STRIP.AUTO-MCNC: NORMAL MG/DL
WBC # BLD AUTO: 4.8 X10*3/UL (ref 4.4–11.3)

## 2024-03-25 PROCEDURE — 85652 RBC SED RATE AUTOMATED: CPT

## 2024-03-25 PROCEDURE — 83550 IRON BINDING TEST: CPT

## 2024-03-25 PROCEDURE — 84550 ASSAY OF BLOOD/URIC ACID: CPT

## 2024-03-25 PROCEDURE — 81003 URINALYSIS AUTO W/O SCOPE: CPT

## 2024-03-25 PROCEDURE — 87086 URINE CULTURE/COLONY COUNT: CPT

## 2024-03-25 PROCEDURE — 80053 COMPREHEN METABOLIC PANEL: CPT

## 2024-03-25 PROCEDURE — 82306 VITAMIN D 25 HYDROXY: CPT

## 2024-03-25 PROCEDURE — 86431 RHEUMATOID FACTOR QUANT: CPT

## 2024-03-25 PROCEDURE — 84443 ASSAY THYROID STIM HORMONE: CPT

## 2024-03-25 PROCEDURE — 85027 COMPLETE CBC AUTOMATED: CPT

## 2024-03-25 PROCEDURE — 84439 ASSAY OF FREE THYROXINE: CPT

## 2024-03-25 PROCEDURE — 82746 ASSAY OF FOLIC ACID SERUM: CPT

## 2024-03-25 PROCEDURE — 83540 ASSAY OF IRON: CPT

## 2024-03-25 PROCEDURE — 80061 LIPID PANEL: CPT

## 2024-03-26 LAB
ALBUMIN SERPL BCP-MCNC: 4.2 G/DL (ref 3.4–5)
ALP SERPL-CCNC: 113 U/L (ref 33–136)
ALT SERPL W P-5'-P-CCNC: 17 U/L (ref 7–45)
ANION GAP SERPL CALC-SCNC: 16 MMOL/L (ref 10–20)
AST SERPL W P-5'-P-CCNC: 26 U/L (ref 9–39)
BACTERIA UR CULT: NORMAL
BILIRUB SERPL-MCNC: 0.9 MG/DL (ref 0–1.2)
BUN SERPL-MCNC: 30 MG/DL (ref 6–23)
CALCIUM SERPL-MCNC: 9.3 MG/DL (ref 8.6–10.6)
CHLORIDE SERPL-SCNC: 101 MMOL/L (ref 98–107)
CHOLEST SERPL-MCNC: 220 MG/DL (ref 0–199)
CHOLESTEROL/HDL RATIO: 3.2
CO2 SERPL-SCNC: 26 MMOL/L (ref 21–32)
CREAT SERPL-MCNC: 0.98 MG/DL (ref 0.5–1.05)
EGFRCR SERPLBLD CKD-EPI 2021: 56 ML/MIN/1.73M*2
GLUCOSE SERPL-MCNC: 82 MG/DL (ref 74–99)
HDLC SERPL-MCNC: 69.8 MG/DL
IRON SATN MFR SERPL: 30 % (ref 25–45)
IRON SERPL-MCNC: 86 UG/DL (ref 35–150)
LDLC SERPL CALC-MCNC: 127 MG/DL
NON HDL CHOLESTEROL: 150 MG/DL (ref 0–149)
POTASSIUM SERPL-SCNC: 3.8 MMOL/L (ref 3.5–5.3)
PROT SERPL-MCNC: 6.4 G/DL (ref 6.4–8.2)
RHEUMATOID FACT SER NEPH-ACNC: 31 IU/ML (ref 0–15)
SODIUM SERPL-SCNC: 139 MMOL/L (ref 136–145)
TIBC SERPL-MCNC: 290 UG/DL (ref 240–445)
TRIGL SERPL-MCNC: 114 MG/DL (ref 0–149)
UIBC SERPL-MCNC: 204 UG/DL (ref 110–370)
URATE SERPL-MCNC: 4.7 MG/DL (ref 2.3–6.7)
VLDL: 23 MG/DL (ref 0–40)

## 2024-04-05 DIAGNOSIS — M05.69: ICD-10-CM

## 2024-04-05 DIAGNOSIS — M10.9 GOUT, UNSPECIFIED CAUSE, UNSPECIFIED CHRONICITY, UNSPECIFIED SITE: Primary | ICD-10-CM

## 2024-04-05 DIAGNOSIS — E55.9 VITAMIN D DEFICIENCY: ICD-10-CM

## 2024-04-05 RX ORDER — LEVOTHYROXINE SODIUM 50 UG/1
50 TABLET ORAL
COMMUNITY

## 2024-04-09 RX ORDER — ALLOPURINOL 100 MG/1
200 TABLET ORAL DAILY
Qty: 90 TABLET | Refills: 3 | Status: SHIPPED | OUTPATIENT
Start: 2024-04-09

## 2024-04-09 RX ORDER — ERGOCALCIFEROL 1.25 MG/1
1.25 CAPSULE ORAL
Qty: 24 CAPSULE | Refills: 1 | Status: SHIPPED | OUTPATIENT
Start: 2024-04-09

## 2024-04-09 RX ORDER — METHOTREXATE 2.5 MG/1
2.5 TABLET ORAL
Qty: 12 TABLET | Refills: 3 | Status: SHIPPED | OUTPATIENT
Start: 2024-04-09 | End: 2024-04-11 | Stop reason: SDUPTHER

## 2024-04-11 RX ORDER — METHOTREXATE 2.5 MG/1
20 TABLET ORAL
Qty: 96 TABLET | Refills: 3 | Status: SHIPPED | OUTPATIENT
Start: 2024-04-14

## 2024-05-29 DIAGNOSIS — M65.342 TRIGGER RING FINGER OF LEFT HAND: ICD-10-CM

## 2024-05-29 DIAGNOSIS — M65.332 TRIGGER MIDDLE FINGER OF LEFT HAND: ICD-10-CM

## 2024-06-12 ENCOUNTER — HOSPITAL ENCOUNTER (OUTPATIENT)
Facility: HOSPITAL | Age: 89
Setting detail: OUTPATIENT SURGERY
Discharge: HOME | End: 2024-06-12
Attending: ORTHOPAEDIC SURGERY | Admitting: ORTHOPAEDIC SURGERY
Payer: MEDICARE

## 2024-06-12 VITALS
BODY MASS INDEX: 24.8 KG/M2 | HEART RATE: 73 BPM | RESPIRATION RATE: 20 BRPM | OXYGEN SATURATION: 97 % | TEMPERATURE: 97.5 F | DIASTOLIC BLOOD PRESSURE: 75 MMHG | SYSTOLIC BLOOD PRESSURE: 167 MMHG | HEIGHT: 64 IN | WEIGHT: 145.28 LBS

## 2024-06-12 DIAGNOSIS — M65.342 TRIGGER RING FINGER OF LEFT HAND: Primary | ICD-10-CM

## 2024-06-12 DIAGNOSIS — M65.332 TRIGGER MIDDLE FINGER OF LEFT HAND: ICD-10-CM

## 2024-06-12 PROCEDURE — 7100000010 HC PHASE TWO TIME - EACH INCREMENTAL 1 MINUTE: Performed by: ORTHOPAEDIC SURGERY

## 2024-06-12 PROCEDURE — 26055 INCISE FINGER TENDON SHEATH: CPT | Performed by: ORTHOPAEDIC SURGERY

## 2024-06-12 PROCEDURE — 3600000003 HC OR TIME - INITIAL BASE CHARGE - PROCEDURE LEVEL THREE: Performed by: ORTHOPAEDIC SURGERY

## 2024-06-12 PROCEDURE — 7100000009 HC PHASE TWO TIME - INITIAL BASE CHARGE: Performed by: ORTHOPAEDIC SURGERY

## 2024-06-12 PROCEDURE — 2500000005 HC RX 250 GENERAL PHARMACY W/O HCPCS: Performed by: ORTHOPAEDIC SURGERY

## 2024-06-12 PROCEDURE — 3600000008 HC OR TIME - EACH INCREMENTAL 1 MINUTE - PROCEDURE LEVEL THREE: Performed by: ORTHOPAEDIC SURGERY

## 2024-06-12 RX ORDER — BUPIVACAINE HYDROCHLORIDE AND EPINEPHRINE 5; 5 MG/ML; UG/ML
INJECTION, SOLUTION EPIDURAL; INTRACAUDAL; PERINEURAL AS NEEDED
Status: DISCONTINUED | OUTPATIENT
Start: 2024-06-12 | End: 2024-06-12 | Stop reason: HOSPADM

## 2024-06-12 RX ORDER — LIDOCAINE HYDROCHLORIDE 10 MG/ML
INJECTION INFILTRATION; PERINEURAL AS NEEDED
Status: DISCONTINUED | OUTPATIENT
Start: 2024-06-12 | End: 2024-06-12 | Stop reason: HOSPADM

## 2024-06-12 RX ORDER — HYDROCODONE BITARTRATE AND ACETAMINOPHEN 5; 325 MG/1; MG/1
1 TABLET ORAL EVERY 6 HOURS PRN
Qty: 12 TABLET | Refills: 0 | Status: SHIPPED | OUTPATIENT
Start: 2024-06-12 | End: 2024-06-15

## 2024-06-12 ASSESSMENT — PAIN SCALES - GENERAL
PAINLEVEL_OUTOF10: 1
PAINLEVEL_OUTOF10: 1

## 2024-06-12 ASSESSMENT — PAIN DESCRIPTION - DESCRIPTORS: DESCRIPTORS: ACHING

## 2024-06-12 ASSESSMENT — PAIN - FUNCTIONAL ASSESSMENT
PAIN_FUNCTIONAL_ASSESSMENT: 0-10
PAIN_FUNCTIONAL_ASSESSMENT: 0-10

## 2024-06-12 NOTE — OP NOTE
Left Middle and Ring Finger Trigger Release / 25 Minutes (L) Operative Note     Date: 2024  OR Location: [facility]    Name: Michell Medrano, : 1935, Age: 89 y.o., MRN: [id number], Sex: female    Diagnosis  Pre-op Diagnosis     * Trigger middle finger of left hand [M65.332]     * Trigger ring finger of left hand [M65.342] Post-op Diagnosis     * Trigger middle finger of left hand [M65.332]     * Trigger ring finger of left hand [M65.342]     Procedures  Left Middle and Ring Finger Trigger Release / 25 Minutes  22242 - PA TENDON SHEATH INCISION      Surgeons      * Alejandro Gunderson - Primary    Resident/Fellow/Other Assistant:  Surgeons and Role:  * No surgeons found with a matching role *    Procedure Summary  Anesthesia: Local  ASA: ASA status not filed in the log.  Anesthesia Staff: No anesthesia staff entered.  Estimated Blood Loss: 2 mL  Intra-op Medications:   Administrations occurring from 1155 to 1240 on 24:   Medication Name Total Dose   BUPivacaine-EPINEPHrine (PF) (Marcaine w/EPI) 0.5 %-1:200,000 injection 2.75 mL   lidocaine (Xylocaine) 10 mg/mL (1 %) injection 2.75 mL              Anesthesia Record               Intraprocedure I/O Totals       None           Specimen: No specimens collected     Staff:   Circulator: Betzaida Canalesub Person: Jamaal         Drains and/or Catheters: * None in log *    Tourniquet Times:         Implants:     Findings: Left middle and ring finger trigger finger    Indications: Michell Medrano is an 89 y.o. female who is having surgery for Trigger middle finger of left hand [M65.332]  Trigger ring finger of left hand [M65.342].      The patient was seen in the preoperative area. The risks, benefits, complications, treatment options, non-operative alternatives, expected recovery and outcomes were discussed with the patient. The possibilities of reaction to medication, pulmonary aspiration, injury to surrounding structures, bleeding, recurrent infection, the need for  additional procedures, failure to diagnose a condition, and creating a complication requiring transfusion or operation were discussed with the patient. The patient concurred with the proposed plan, giving informed consent.  The site of surgery was properly noted/marked if necessary per policy. The patient has been actively warmed in preoperative area. Preoperative antibiotics are not indicated. Venous thrombosis prophylaxis are not indicated.    Procedure Details:   89-year-old female symptomatic trigger finger left middle and ring finger with only temporary relief with steroid injections presents today for trigger finger release surgery.  Preoperatively the left middle and ring finger were identified and marked for surgery.  Informed consent process was completed.    Patient was brought to the operating and placed supine on the operating table.  A timeout procedure was performed to verify the correct patient procedure and operative site.  We then injected local anesthetic into the palm overlying the planned surgical incision.  This was a combination of 0.5% Marcaine with epinephrine and 1% lidocaine taking care to avoid administering this medication too close to the neurovascular bundles.    Left upper extremity was prepped and draped in usual sterile fashion.  We then made an incision in the distal palmar crease to expose the middle and ring finger flexor tendon sheath.  There was some small amounts of oozing from the cut skin edges.  We then exposed the ring finger flexor sheath.  The sheath was incised and the A1 pulley was divided longitudinally.  The palmar pulley was also released.  In a similar fashion we exposed to the middle finger flexor sheath.  The sheath was incised and the A1 pulley was divided longitudinally.  The palmar pulley was also released.  Patient was then able to demonstrate full active finger flexion and extension without triggering.  The wound was copiously irrigated and then closed in  interrupted fashion.  A sterile bandage was applied.  The patient was transferred to the recovery in stable condition.    Postoperatively she will be discharged home once comfortable.  She can remove her bandage on postop day #4 and begin wound care with gentle activities as instructed.  Return to clinic in 2 weeks for wound check and advancement of her activities.        Complications:  None; patient tolerated the procedure well.    Disposition: PACU - hemodynamically stable.  Condition: stable         Additional Details:      Attending Attestation: I was present and scrubbed for the entire procedure.    Alejandro Gunderson  Phone Number: [phone]

## 2024-06-12 NOTE — H&P
History Of Present Illness  Michell Medrano is a 89 y.o. female presenting with left middle and ring finger trigger finger.     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 She has never used smokeless tobacco. She reports that she does not currently use alcohol. She reports that she does not use drugs.    Family History  No family history on file.     Allergies  Doxycycline, Erythromycin, Erythromycin base, Methen-m.blue-s.phos-phsal-hyo, Naproxen, Penicillins, Prednisone, Pseudoephedrine, Tetracycline, Cephalosporins, and Red dye    Review of Systems   All other systems reviewed and are negative.       Physical Exam  Physical Examination Findings:  Constitutional: Appears well-developed and well-nourished.  Head: Normocephalic and atraumatic.  Eyes: Pupils are equal and round.  Cardiovascular: Intact distal pulses.   Respiratory: Effort normal. No respiratory distress.  Neurologic: Alert and oriented to person, place, and time.  Skin: Skin is warm and dry.  Hematologic / Lympahtic: No lymphedema, lymphangitis.  Psychiatric: normal mood and affect. Behavior is normal.   Musculoskeletal: Left middle and ring finger trigger finger      Last Recorded Vitals  Blood pressure 167/75, pulse 73, temperature 36.4 °C (97.5 °F), temperature source Tympanic, resp. rate  "20, height 1.626 m (5' 4\"), weight 65.9 kg (145 lb 4.5 oz), SpO2 97%.     Assessment/Plan   Active Problems:    Trigger middle finger of left hand    Trigger ring finger of left hand      Will proceed with left middle and ring finger trigger release as scheduled       Alejandro Gunderson MD    "

## 2024-06-12 NOTE — DISCHARGE INSTRUCTIONS
Post-Operative Instructions  Dr. Alejandro Gunderson [phone]    Dressing:  You have a bandage or splint covering your operative site.    You may remove the dressing in 4  days following surgery. Once your dressing is removed you may shower and/or wash your incision in a sink with soap and water. Do not soak your incision. No bath tubs, hot tubs or swimming pools. After washing the wound please pat the incision dry thoroughly. Please use mild soap. Please do not use hydrogen peroxide. Please cover the wound with a band-aid or gauze and change it daily. Please do not apply any salves, creams, lotions or ointments to the surgical incision. Otherwise keep incision clean and dry (no yard work, engine work, etc.)    Showering:  You may shower following surgery but please adhere to above instructions regarding the dressing. If showering with bandage/splint in place please ensure that it is kept dry and covered while bathing. There are commercially available cast bags that can be used to protect your dressing while showering. If using garbage bags please make sure that there are no holes in the bag and that the bag has been sealed above the dressing. If the bandage gets wet you must contact your surgeon's office to make arrangements to be seen to have the bandage changed.     Ice/Elevation:  The application of ice to your surgical site after surgery will help with pain control and swelling. This can be very effective for 48-72 hours after surgery. Please be careful to avoid getting bandage wet from a leaky ice bag. Please be advised that the ice may need to be applied for longer periods of time for the cooling effect to penetrate the post-operative dressing.     Elevation of the operative site above the level of the heart as much as possible for the first 48-72 hours after surgery. Use your sling if you have been provided one while standing or walking. If your fingers are not included in the dressing you are encouraged to  attempt finger range of motion as this will help with your hand swelling and ultimate recovery.    Pain Medication:  If you received a regional anesthetic on the day of your surgery your arm and hand may be numb for up to 24 hours after surgery. It is important to wear your sling while the block is still effective in order to protect your arm. It is advisable to take pain medications prior to going to sleep in case the regional anesthesia medication wears off while you are sleeping.    Take your pain medications as directed. Narcotic pain medications can cause lethargy, nausea and constipation. Please contact your surgeon's office and discontinue the medication if these symptoms become problematic. Eating a regular diet, drinking fluids and walking can help with constipation from these medications. Avoid alcohol consumption and driving while taking narcotic pain medications.     Additional pain control options:     You are encouraged to take over the counter medications like Advil / Motrin / Ibuprofen / Aleve in addition to your prescribed pain medications after surgery.    Smoking/Tobacco:  Tobacco use is known to interfere with wound and fracture healing and increase post-operative pain. It can also increase your risk of poor outcomes following surgery. Please do not use tobacco or nicotine products following your surgery. This includes smokeless tobacco, or nicotine replacement products (patches, gum, etc.).    Driving:  It is not advisable to operate a vehicle while using narcotic pain medications. Additionally, please be advised that you are likely to have challenges operating a car or motorcycle while you are still in your postoperative dressing and this could increase your risk of being involved in an accident and being cited for driving while physically impaired.     Warning Signs:  Observe your arm/hand and incision site (if visible) for increased redness, inflammation, drainage, odor or pain that is  unrelieved by rest, elevation or medication. Please contact your surgeon's office immediately if you develop any of these issues or if you develop a fever greater than 101°.    Follow Up Appointments:  Your post-operative appointment has been scheduled for  6/24/24 10:45 am with Lisa High    [facility], [address] [place] [address] Rd[address] [place], [place], [address]

## 2024-06-13 ENCOUNTER — APPOINTMENT (OUTPATIENT)
Dept: ORTHOPEDIC SURGERY | Facility: CLINIC | Age: 89
End: 2024-06-13
Payer: MEDICARE

## 2024-06-24 ENCOUNTER — APPOINTMENT (OUTPATIENT)
Dept: ORTHOPEDIC SURGERY | Facility: CLINIC | Age: 89
End: 2024-06-24
Payer: MEDICARE

## 2024-06-24 VITALS — WEIGHT: 145 LBS | HEIGHT: 64 IN | BODY MASS INDEX: 24.75 KG/M2

## 2024-06-24 DIAGNOSIS — M65.342 TRIGGER RING FINGER OF LEFT HAND: Primary | ICD-10-CM

## 2024-06-24 DIAGNOSIS — M65.332 TRIGGER MIDDLE FINGER OF LEFT HAND: ICD-10-CM

## 2024-06-24 PROCEDURE — 99024 POSTOP FOLLOW-UP VISIT: CPT | Performed by: PHYSICIAN ASSISTANT

## 2024-06-24 PROCEDURE — 1157F ADVNC CARE PLAN IN RCRD: CPT | Performed by: PHYSICIAN ASSISTANT

## 2024-06-24 PROCEDURE — 1036F TOBACCO NON-USER: CPT | Performed by: PHYSICIAN ASSISTANT

## 2024-06-24 ASSESSMENT — PAIN DESCRIPTION - DESCRIPTORS: DESCRIPTORS: ACHING;SORE

## 2024-06-24 ASSESSMENT — PAIN - FUNCTIONAL ASSESSMENT: PAIN_FUNCTIONAL_ASSESSMENT: 0-10

## 2024-06-24 ASSESSMENT — PAIN SCALES - GENERAL: PAINLEVEL_OUTOF10: 5 - MODERATE PAIN

## 2024-06-24 NOTE — PROGRESS NOTES
Patient presents to clinic today for first postoperative visit after trigger finger release of the middle and ring finger performed on 6/12/2024.  Overall she is doing well.  She has noticed some issues with wound healing.  Notices some stiffness specifically at the middle finger.  Denies any fevers or chills.    Examination: Surgical incision with some mild wound dehiscence.  No surrounding erythema active drainage or purulent drainage no signs of any active infection.  Slight limitations to full terminal finger flexion of the middle digit.  Fingers are warm and well-perfused.    Impression: Left known ring trigger finger    Plan: We discussed appropriate wound care.  She may continue washing the hand with soap and water.  She should apply Vaseline over the wound as well as a Band-Aid she may leave it uncovered for several hours.  She may continue to work on range of motion.  At this time she would like to hold off on any therapy given her discomfort.  We will have her return to our office in 2 to 3 weeks for wound check at that time if she is still having issues we will get her set up with occupational therapy.    Patient was also seen and examined by Dr. Gunderson who also participated in treatment course and plan.    Lisa High PA-C  Department of Orthopaedic Surgery  [facility]    Dictation performed with the use of voice recognition software. Syntax and grammatical errors may exist.

## 2024-07-08 DIAGNOSIS — M05.69: ICD-10-CM

## 2024-07-08 RX ORDER — METHOTREXATE 2.5 MG/1
20 TABLET ORAL
Qty: 96 TABLET | Refills: 3 | Status: SHIPPED | OUTPATIENT
Start: 2024-07-08

## 2024-07-09 ENCOUNTER — APPOINTMENT (OUTPATIENT)
Dept: ORTHOPEDIC SURGERY | Facility: CLINIC | Age: 89
End: 2024-07-09
Payer: MEDICARE

## 2024-07-15 ENCOUNTER — APPOINTMENT (OUTPATIENT)
Dept: ORTHOPEDIC SURGERY | Facility: CLINIC | Age: 89
End: 2024-07-15
Payer: MEDICARE

## 2024-07-15 VITALS — BODY MASS INDEX: 24.75 KG/M2 | WEIGHT: 145 LBS | HEIGHT: 64 IN

## 2024-07-15 DIAGNOSIS — M65.332 TRIGGER MIDDLE FINGER OF LEFT HAND: Primary | ICD-10-CM

## 2024-07-15 PROCEDURE — 1157F ADVNC CARE PLAN IN RCRD: CPT | Performed by: PHYSICIAN ASSISTANT

## 2024-07-15 PROCEDURE — 1036F TOBACCO NON-USER: CPT | Performed by: PHYSICIAN ASSISTANT

## 2024-07-15 PROCEDURE — 99024 POSTOP FOLLOW-UP VISIT: CPT | Performed by: PHYSICIAN ASSISTANT

## 2024-07-15 ASSESSMENT — PAIN SCALES - GENERAL: PAINLEVEL_OUTOF10: 5 - MODERATE PAIN

## 2024-07-15 ASSESSMENT — PAIN - FUNCTIONAL ASSESSMENT: PAIN_FUNCTIONAL_ASSESSMENT: 0-10

## 2024-07-15 ASSESSMENT — PAIN DESCRIPTION - DESCRIPTORS: DESCRIPTORS: ACHING;TENDER;SORE

## 2024-07-15 NOTE — PROGRESS NOTES
Doing presents to clinic today for second postoperative visit and wound check after trigger finger release of the left middle and ring fingers performed on 6/12/2024.  She has noticed improvement of her healing however still having some tenderness.  Complains of some mild stiffness and swelling when waking up in the morning.  She has been working on massage.    Examination: Well-healed surgical incision.  Mild focal swelling around incision site no signs of any active infection good digital finger range of motion with slight limitations to full terminal finger flexion of the middle digit.  Fingers are warm and well-perfused.    Impression: Left hand middle and ring trigger finger    Plan: Overall her wound looks well-healed.  We have discussed scar tissue massage techniques and range of motion activity.  She does have occupational therapy at her skilled nursing facility.  I have given her referral today if she is still having issues I would recommend her getting into therapy.  She may use the hand as tolerated without restrictions.  Return to our office as needed.    Lisa High PA-C  Department of Orthopaedic Surgery  [facility]    Dictation performed with the use of voice recognition software. Syntax and grammatical errors may exist.

## 2024-07-17 ENCOUNTER — APPOINTMENT (OUTPATIENT)
Dept: ORTHOPEDIC SURGERY | Facility: CLINIC | Age: 89
End: 2024-07-17
Payer: MEDICARE

## 2024-08-06 ENCOUNTER — APPOINTMENT (OUTPATIENT)
Dept: OCCUPATIONAL THERAPY | Facility: CLINIC | Age: 89
End: 2024-08-06
Payer: MEDICARE

## 2024-08-08 ENCOUNTER — OFFICE VISIT (OUTPATIENT)
Dept: ORTHOPEDIC SURGERY | Facility: CLINIC | Age: 89
End: 2024-08-08
Payer: MEDICARE

## 2024-08-08 DIAGNOSIS — M19.071 OSTEOARTHRITIS OF RIGHT SUBTALAR JOINT: Primary | ICD-10-CM

## 2024-08-08 DIAGNOSIS — M21.41 ACQUIRED PES PLANUS OF RIGHT FOOT: ICD-10-CM

## 2024-08-08 DIAGNOSIS — M21.6X1 ACQUIRED PRONATION DEFORMITY OF RIGHT FOOT: ICD-10-CM

## 2024-08-08 PROCEDURE — 99212 OFFICE O/P EST SF 10 MIN: CPT | Performed by: STUDENT IN AN ORGANIZED HEALTH CARE EDUCATION/TRAINING PROGRAM

## 2024-08-08 ASSESSMENT — PAIN DESCRIPTION - DESCRIPTORS: DESCRIPTORS: ACHING;SORE;DULL;DISCOMFORT

## 2024-08-08 ASSESSMENT — PAIN SCALES - GENERAL: PAINLEVEL_OUTOF10: 3

## 2024-08-08 ASSESSMENT — PAIN - FUNCTIONAL ASSESSMENT: PAIN_FUNCTIONAL_ASSESSMENT: 0-10

## 2024-08-08 NOTE — PROGRESS NOTES
ORTHOPAEDIC SURGERY OUTPATIENT PROGRESS NOTE    Chief Complaint:  Right ankle pain    History Of Present Illness  Michell Medrano is a 89 y.o. female who presents for evaluation of right ankle pain.  Patient  underwent right great toe IPJ arthrodesis and claw toe corrective surgery with Dr. Escoto from May 2023.  She has done well from the surgery.  Even before this surgery and over the last year she has had pain on the outside of her foot.  She has intermittently utilized walking boots for more severe pain which has resolved in the past.  She has followed with one of our local podiatrists Dr. Dockery and continues with him for nail care.  She has had success with custom orthotics and is due for a new pair.  Patient ambulates with a walker at baseline, she is not interested in pursuing surgical management for this problem.  She describes 3 out of 10 pain that is sharp and made worse with walking and standing.  She denies any specific trauma or associated numbness, tingling weakness.  Patient has had flatfeet for as long as she can recall without obvious progression recently.  She notices that her shoes tend to wear out over time in a predictable fashion.     08/08/2024: Patient returns for later than expected follow-up of her right ankle pain.  She underwent hand surgery from which she is recovering well.  She is reporting overall improved pain.  Currently rated as 3 out of 10, this suddenly worsened last Friday.  Patient does believe that she overdid it on Thursday.  She has obtained custom fabricated orthotics which worsened her pain.  Pain is made worse overall with walking and standing.  Although she is encumbered by her pain, she is satisfied with the improvement she has had overall.  She continues to ambulate with the use of a walker at baseline.    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     Allergies  Allergies   Allergen Reactions    Doxycycline Unknown    Erythromycin Unknown    Erythromycin Base Unknown    Methen-M.Blue-S.Phos-Phsal-Hyo Unknown     Uribel CAPS    Naproxen Unknown    Penicillins Unknown    Prednisone Unknown    Pseudoephedrine Unknown    Tetracycline Unknown    Cephalosporins Hives    Red Dye Rash       Review of Systems  REVIEW OF SYSTEMS  Constitutional: no unplanned weight loss  Psychiatric: no suicidal ideation  ENT: no vision changes, no sinus problems  Pulmonary: no shortness of breath  Lymphatic: no enlarged lymph nodes  Cardiovascular: no chest pain or shortness of breath  Gastrointestinal: no stomach problems  Genitourinary: no dysuria   Skin: no rashes  Endocrine: no thyroid problems  Neurological: no headache, no numbness  Hematological: no easy bruising  Musculoskeletal: Right foot pain     Physical Exam  PHYSICAL EXAMINATION  Constitutional Exam: well developed and well nourished  Psychiatric Exam: alert and oriented, appropriate mood and behavior  Eye Exam: EOMI  Pulmonary Exam: breathing non-labored, no apparent distress  Lymphatic exam: no appreciable lymphadenopathy in the lower extremities  Cardiovascular exam: RRR to peripheral palpation, DP pulses 2+, PT 2+, toes are pink with good capillary refill, no pitting edema  Skin exam: no open lesions, rashes, abrasions or ulcerations  Neurological exam: sensation to light touch intact  in both lower extremities in peripheral and dermatomal distributions (except for any abnormalities noted in musculoskeletal exam)    Musculoskeletal exam: Right lower extremity examination.  Patient typical pain continues to localize to the sinus Tarsi.  She is focally tender to palpation there.  No obvious tenderness to palpation overlying the deltoid.  There is no ankle effusion.  Patient has greater than physiologic hindfoot valgus with pes planus arch posture and no significant forefoot deformity noted except for claw toe and fourth curly toe with relative varus deviation.  Patient has pain-free and supple ankle range of motion with painful and severely restricted subtalar joint range of motion as well as pain-free and supple midtarsal joint range of motion. Patient has sensation intact to light touch grossly in a saphenous, sural, superficial peroneal, deep peroneal and tibial nerve distribution.  Patient has 5 out of 5 strength in plantarflexion, dorsiflexion and EHL. Patient has 2+ DP/PT pulse palpated.     Last Recorded Vitals  There were no vitals taken for this visit.    Laboratory Results  No results found for this or any previous visit (from the past 24 hour(s)).     Radiology Results  No new imaging at this visit.    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  I have reviewed the diagnosis and treatment options extensively with the patient.  I would recommend the patient continue weightbearing to her tolerance in her right lower extremity.  Based on rereview of her radiographs and current level of symptoms, I suspect it would be unlikely for her to have significant pain relief from a R ST CSI and so I would not recommend one at this time.  The patient is considering having a new 3D printed orthotic made, I have encouraged her to bring her  previous orthotics to the new orthotic maker to ensure that they would meet her specifications.  She typically wears SAS shoes with some arch support that aid in her symptoms overall.  I again reviewed with the patient that if she were to fail an exhaustive and appropriate course of nonoperative treatment that she could ultimately consider a right flatfoot reconstruction through an arthrodesis approach, however based on her current symptoms I would not recommend this surgical approach at this time.  I will plan to see the patient back on an as-needed basis.  I encouraged the patient to contact the office if she develops any new pain, worsening symptoms or if she has any further questions.    Kingston Farrell MD, ROSIO  Department of Orthopaedic Surgery  [facility] [place]    The diagnosis and treatment plan were reviewed with the patient. All questions were answered. The patient verbalized understanding of the treatment plan. There were no barriers to understanding identified.    Note dictated with [employer] software.  Completed without full type editing and sent to avoid delay.

## 2024-08-08 NOTE — LETTER
August 8, 2024     No Recipients    Patient: Michell Medrano   YOB: 1935   Date of Visit: 8/8/2024       Dear Dr. Martin Recipients:    Thank you for referring Michell Medrano to me for evaluation. Below are my notes for this consultation.  If you have questions, please do not hesitate to call me. I look forward to following your patient along with you.       Sincerely,     Kingston Farrell MD      CC: No Recipients  ______________________________________________________________________________________    ORTHOPAEDIC SURGERY OUTPATIENT PROGRESS NOTE    Chief Complaint:  Right ankle pain    History Of Present Illness  Michell Medrano is a 89 y.o. female who presents for evaluation of right ankle pain.  Patient  underwent right great toe IPJ arthrodesis and claw toe corrective surgery with Dr. Escoto from May 2023.  She has done well from the surgery.  Even before this surgery and over the last year she has had pain on the outside of her foot.  She has intermittently utilized walking boots for more severe pain which has resolved in the past.  She has followed with one of our local podiatrists Dr. Dockery and continues with him for nail care.  She has had success with custom orthotics and is due for a new pair.  Patient ambulates with a walker at baseline, she is not interested in pursuing surgical management for this problem.  She describes 3 out of 10 pain that is sharp and made worse with walking and standing.  She denies any specific trauma or associated numbness, tingling weakness.  Patient has had flatfeet for as long as she can recall without obvious progression recently.  She notices that her shoes tend to wear out over time in a predictable fashion.     08/08/2024: Patient returns for later than expected follow-up of her right ankle pain.  She underwent hand surgery from which she is recovering well.  She is reporting overall improved pain.  Currently rated as 3 out of 10, this suddenly worsened  last Friday.  Patient does believe that she overdid it on Thursday.  She has obtained custom fabricated orthotics which worsened her pain.  Pain is made worse overall with walking and standing.  Although she is encumbered by her pain, she is satisfied with the improvement she has had overall.  She continues to ambulate with the use of a walker at baseline.    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     Allergies  Allergies   Allergen Reactions   • Doxycycline Unknown   • Erythromycin Unknown   • Erythromycin Base Unknown   • Methen-M.Blue-S.Phos-Phsal-Hyo Unknown     Uribel CAPS   • Naproxen Unknown   • Penicillins Unknown   • Prednisone Unknown   • Pseudoephedrine Unknown   • Tetracycline Unknown   • Cephalosporins Hives   • Red Dye Rash       Review of Systems  REVIEW OF SYSTEMS  Constitutional: no unplanned weight loss  Psychiatric: no suicidal ideation  ENT: no vision changes, no sinus problems  Pulmonary: no shortness of breath  Lymphatic: no enlarged lymph nodes  Cardiovascular: no chest pain or shortness of breath  Gastrointestinal: no stomach problems  Genitourinary: no dysuria   Skin: no rashes  Endocrine: no thyroid problems  Neurological: no headache, no numbness  Hematological: no easy bruising  Musculoskeletal: Right foot pain     Physical  Exam  PHYSICAL EXAMINATION  Constitutional Exam: well developed and well nourished  Psychiatric Exam: alert and oriented, appropriate mood and behavior  Eye Exam: EOMI  Pulmonary Exam: breathing non-labored, no apparent distress  Lymphatic exam: no appreciable lymphadenopathy in the lower extremities  Cardiovascular exam: RRR to peripheral palpation, DP pulses 2+, PT 2+, toes are pink with good capillary refill, no pitting edema  Skin exam: no open lesions, rashes, abrasions or ulcerations  Neurological exam: sensation to light touch intact in both lower extremities in peripheral and dermatomal distributions (except for any abnormalities noted in musculoskeletal exam)    Musculoskeletal exam: Right lower extremity examination.  Patient typical pain continues to localize to the sinus Tarsi.  She is focally tender to palpation there.  No obvious tenderness to palpation overlying the deltoid.  There is no ankle effusion.  Patient has greater than physiologic hindfoot valgus with pes planus arch posture and no significant forefoot deformity noted except for claw toe and fourth curly toe with relative varus deviation.  Patient has pain-free and supple ankle range of motion with painful and severely restricted subtalar joint range of motion as well as pain-free and supple midtarsal joint range of motion. Patient has sensation intact to light touch grossly in a saphenous, sural, superficial peroneal, deep peroneal and tibial nerve distribution.  Patient has 5 out of 5 strength in plantarflexion, dorsiflexion and EHL. Patient has 2+ DP/PT pulse palpated.     Last Recorded Vitals  There were no vitals taken for this visit.    Laboratory Results  No results found for this or any previous visit (from the past 24 hour(s)).     Radiology Results  No new imaging at this visit.    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  I have reviewed the diagnosis and treatment options extensively with the patient.  I would recommend the patient continue weightbearing to her tolerance in her right lower extremity.  Based on rereview of her radiographs and current level of symptoms, I suspect it would be unlikely for her to have significant pain relief from a R ST CSI and so I would not recommend one at this time.  The patient is considering having a new 3D printed orthotic made, I have encouraged her to bring her previous orthotics to the new orthotic maker to ensure that they would meet her specifications.  She typically wears SAS shoes with some arch support that aid in her symptoms overall.  I again reviewed with the patient that if she were to fail an exhaustive and appropriate course of nonoperative treatment that she could ultimately consider a right flatfoot reconstruction through an arthrodesis approach, however based on her current symptoms I would not recommend this surgical approach at this time.  I will plan to see the patient back on an as-needed basis.  I encouraged the patient to contact the office if she develops any new pain, worsening symptoms or if she has any further questions.    Kingston Farrell MD, ROSIO  Department of Orthopaedic Surgery  [facility]    The diagnosis and treatment plan were reviewed with the patient. All questions were answered. The patient verbalized understanding of the treatment plan. There were no barriers to understanding identified.    Note dictated with An[employer] software.  Completed without full type editing and sent to avoid delay.

## 2024-08-16 ENCOUNTER — EVALUATION (OUTPATIENT)
Dept: OCCUPATIONAL THERAPY | Facility: CLINIC | Age: 89
End: 2024-08-16
Payer: MEDICARE

## 2024-08-16 DIAGNOSIS — M65.332 TRIGGER MIDDLE FINGER OF LEFT HAND: ICD-10-CM

## 2024-08-16 PROCEDURE — 97165 OT EVAL LOW COMPLEX 30 MIN: CPT | Mod: GO | Performed by: OCCUPATIONAL THERAPIST

## 2024-08-16 PROCEDURE — 97112 NEUROMUSCULAR REEDUCATION: CPT | Mod: GO | Performed by: OCCUPATIONAL THERAPIST

## 2024-08-16 NOTE — PROGRESS NOTES
"    Occupational Therapy Orthopedic Evaluation    Patient Name: Michell Medrano  MRN: [id number]  Today's Date: 8/16/2024  Time Calculation  Start Time: 1100  Stop Time: 1135  Time Calculation (min): 35 min    Insurance:  Visit number: 1 of [place]  Authorization info: none required  Insurance Type: Medicare and [place] supplement  Medicare certification  -  Start: 8/16/24  End:  10/11/24    Current Problem  1. Trigger middle finger of left hand  Referral to Occupational Therapy    Follow Up In Occupational Therapy          Referred by: Lisa High Pa-C  Referred for: L MF/R trigger release  DOS: 6/12/24    Fall risk:  Low  Precautions:  None     Medical History Form: Reviewed (scanned into chart)    Subjective   Chief Complaint: Pain on medial lateral aspect of RF and MF PIPJ     Hand Dominance: Right    Pain:  2-3/10  Location: medial lateral aspect of RF and MF PIPJ   Description: nerve pain  Aggravating Factors:   morning stiffness pain and active use  Relieving Factors:  arthritis gloves    Previous Interventions/Treatments: None    Prior Level of Function (PLOF)  Patient previously mod I with all ADLs/IADLs    ADL/IADL impairments  Dressing and Hygiene/Grooming    Patients Living Environment: Reviewed and no concern  L[facility] with: at [facility]  Primary Language: English  Patient's Goal(s) for Therapy: \"improve strength and reduce finger pain\"    Red Flags: Do you have any of the following? No  Fever/chills, unexplained weight changes, dizziness/fainting, unexplained change in bowel or bladder functions, unexplained malaise or muscle weakness, night pain/sweats, numbness or tingling    Objective   HAND STRENGTH (Lbs)   R L   Dynamometer  20 15       LEFT HAND AROM (Degrees)   MCP PIP DIP SHELL DPC   IF         MF 0/80 0/105 0/60     RF 0/90 0/105 0/65     SF            Outcome Measures:         QUICK DASH:      Home Program:  Exercise Sets/Reps Comments   AROM tendon gliding     Desensitization   "   Scar massage     Heat                                     Treatment Today  - OT eval completed  - Neuro re-ed:   - ASL AROM within fluidotherapy for desensitization and re-ed of digital proprioception x 10min    EDUCATION: Home exercise program, plan of care, activity modifications, joint protection, pain management, and injury pathology       Assessment:   Pt is 89 year old female who underwent L MF/RF trigger finger releas  and is presenting today for evaluation with complaints of decreased strength, decreased ROM, and increased pain.  As a result of these impairments pt is having difficulty with upper body/lower body bathing and dressing tasks, difficulty with hair care tasks, and is having difficulty with home management and meal prep tasks.  Without skilled intervention patient is at risk for further loss of strength, reduced ADL/IADL function, and is at risk for requiring caregiver assistance for self care completion.  Patient to benefit from skilled services to address the impairments found in order to return to independent prior level of function.      Level of Complexity  LOW complexity    OT Rehab Potential  Excellent      Plan:     Planned Interventions include: therapeutic exercise, self-care home management, manual therapy, therapeutic activities, , neuromuscular coordination, vasopneumatic, dry needling, and orthosis fabrication.  Frequency: 1 x Week  Duration: 8 Weeks    Goals: Set and discussed today         1) Patient will demo at least 5 pounds improved left  strength for weightbearing tolerance on her walker, in 4 weeks.         2) Patient well report no more than 2/10 L finger pain with gross grasping, in 4 weeks         3) Patient will correctly return demo of desensitization techniques to ensure carryover to home, in 4 weeks      Plan of care was developed with input and agreement by the patient.      Ne Bush, OT

## 2024-08-20 ENCOUNTER — TREATMENT (OUTPATIENT)
Dept: OCCUPATIONAL THERAPY | Facility: CLINIC | Age: 89
End: 2024-08-20
Payer: MEDICARE

## 2024-08-20 DIAGNOSIS — M65.332 TRIGGER MIDDLE FINGER OF LEFT HAND: ICD-10-CM

## 2024-08-20 PROCEDURE — 97535 SELF CARE MNGMENT TRAINING: CPT | Mod: GO | Performed by: OCCUPATIONAL THERAPIST

## 2024-08-20 PROCEDURE — 97140 MANUAL THERAPY 1/> REGIONS: CPT | Mod: GO | Performed by: OCCUPATIONAL THERAPIST

## 2024-08-20 PROCEDURE — 97112 NEUROMUSCULAR REEDUCATION: CPT | Mod: GO | Performed by: OCCUPATIONAL THERAPIST

## 2024-08-20 ASSESSMENT — ACTIVITIES OF DAILY LIVING (ADL): HOME_MANAGEMENT_TIME_ENTRY: 15

## 2024-08-20 NOTE — PROGRESS NOTES
Occupational Therapy Orthopedic Evaluation    Patient Name: Michell Medrano  MRN: [id number]  Today's Date: 8/20/2024  Time Calculation  Start Time: 1102  Stop Time: 1142  Time Calculation (min): 40 min    Insurance:  Visit number: 2 of [place]  Authorization info: none required  Insurance Type: Medicare and Med [place] supplement  Medicare certification  -  Start: 8/16/24  End:  10/11/24    Current Problem  1. Trigger middle finger of left hand  Follow Up In Occupational Therapy          Referred by: Lisa High Pa-C  Referred for: L MF/R trigger release  DOS: 6/12/24    Fall risk:  Low  Precautions:  None     Medical History Form: Reviewed (scanned into chart)    Subjective   Patient reports she holds her phone with her left hand frequently. Patient reports she is performing desensitization at home  Hand Dominance: Right    Pain:  2-3/10  Location: medial lateral aspect of RF and MF PIPJ   Description: nerve pain    Objective   HAND STRENGTH (Lbs)   R L   Dynamometer  20 15       LEFT HAND AROM (Degrees)   MCP PIP DIP SHELL DPC   IF         MF 0/80 0/105 0/60     RF 0/90 0/105 0/65     SF            Outcome Measures:         QUICK DASH:      Home Program:  Exercise Sets/Reps Comments   AROM tendon gliding     Desensitization     Scar massage     Heat                                     Treatment Today  Neuro re-ed:   - ASL AROM within fluidotherapy for desensitization and re-ed of digital proprioception x 10min    Manual:   -scar massage to palmar scar to increase scar elasticity performed x 10 min    Self-care:   - Patient educated to avoid repetitive strain to left hand from holding phone for prolonged periods to decrease shear stress to palmar soft tissues x 15 min      EDUCATION: Home exercise program, plan of care, activity modifications, joint protection, pain management, and injury pathology.   Assessment:   Patient tolerated OT treatment well. Patient demonstrates improved scar mobility and is demonstrating  good ROM with no reports of triggering in MF or RF. However, patient reports triggering in IF. Patient educated to avoid repetitive strain to hand from prolonged gripping of phone to decrease shear forces to palmar soft tissues. Patient is independent with HEP. Progressing well toward goals.    Level of Complexity  LOW complexity    OT Rehab Potential  Excellent      Plan:     Planned Interventions include: therapeutic exercise, self-care home management, manual therapy, therapeutic activities, , neuromuscular coordination, vasopneumatic, dry needling, and orthosis fabrication.  Frequency: 1 x Week  Duration: 8 Weeks    Goals: Set and discussed today         1) Patient will demo at least 5 pounds improved left  strength for weightbearing tolerance on her walker, in 4 weeks.         2) Patient well report no more than 2/10 L finger pain with gross grasping, in 4 weeks         3) Patient will correctly return demo of desensitization techniques to ensure carryover to home, in 4 weeks      Plan of care was developed with input and agreement by the patient.      Ne Bush, OT

## 2024-08-27 ENCOUNTER — TREATMENT (OUTPATIENT)
Dept: OCCUPATIONAL THERAPY | Facility: CLINIC | Age: 89
End: 2024-08-27
Payer: MEDICARE

## 2024-08-27 DIAGNOSIS — M65.332 TRIGGER MIDDLE FINGER OF LEFT HAND: ICD-10-CM

## 2024-08-27 PROCEDURE — 97110 THERAPEUTIC EXERCISES: CPT | Mod: GO | Performed by: OCCUPATIONAL THERAPIST

## 2024-08-27 PROCEDURE — 97112 NEUROMUSCULAR REEDUCATION: CPT | Mod: GO | Performed by: OCCUPATIONAL THERAPIST

## 2024-08-27 NOTE — PROGRESS NOTES
"    Occupational Therapy Orthopedic Treatment Note    Patient Name: Michell Medrano  MRN: [id number]  Today's Date: 8/28/2024  Time Calculation  Start Time: 0300  Stop Time: 0338  Time Calculation (min): 38 min    Insurance:  Visit number: 3 of [place]  Authorization info: none required  Insurance Type: Medicare and Med [place] supplement  Medicare certification  -  Start: 8/16/24  End:  10/11/24    Current Problem  1. Trigger middle finger of left hand  Follow Up In Occupational Therapy          Referred by: Lisa High Pa-C  Referred for: L MF/R trigger release  DOS: 6/12/24    Fall risk:  Low  Precautions:  None     Medical History Form: Reviewed (scanned into chart)    Subjective   \"My fingers are getting better\"    Hand Dominance: Right    Pain:  2-3/10  Location: medial lateral aspect of RF and MF PIPJ   Description: nerve pain    Objective   HAND STRENGTH (Lbs)   R L   Dynamometer  20 15       LEFT HAND AROM (Degrees)   MCP PIP DIP SHELL DPC   IF         MF 0/80 0/105 0/60     RF 0/90 0/105 0/65     SF            Outcome Measures:         QUICK DASH:      Home Program:  Exercise Sets/Reps Comments   AROM tendon gliding     Desensitization     Scar massage     Heat                                     Treatment Today  Neuro re-ed:  - tendon gliding AROM within fluidotherapy for desensitization and re-ed of digital proprioception x 13min    Therapeutic ex:  - red flexbar sup/pron to increase  strength 4x10  - crumble and spread paper to promote tendon excursion x 8 min  - ASL AROM to promote tendon excursion x 8 min  - reviewed HEP to ensure carryover to home x 5 min      EDUCATION: Home exercise program, plan of care, activity modifications, joint protection, pain management, and injury pathology.       Assessment:   Tolerated all tendon excursion ex quite well without reactivity. Avoided stressful gripping to prevent triggering episodes she reports of IF Excellent understanding of her HEP.  Progressing well " towards goals.      Level of Complexity  LOW complexity    OT Rehab Potential  Excellent      Plan:     Planned Interventions include: therapeutic exercise, self-care home management, manual therapy, therapeutic activities, , neuromuscular coordination, vasopneumatic, dry needling, and orthosis fabrication.  Frequency: 1 x Week  Duration: 8 Weeks    Goals: Set and discussed today         1) Patient will demo at least 5 pounds improved left  strength for weightbearing tolerance on her walker, in 4 weeks.         2) Patient well report no more than 2/10 L finger pain with gross grasping, in 4 weeks         3) Patient will correctly return demo of desensitization techniques to ensure carryover to home, in 4 weeks      Plan of care was developed with input and agreement by the patient.      Ne Bush, OT

## 2024-08-28 ENCOUNTER — HOSPITAL ENCOUNTER (OUTPATIENT)
Dept: RADIOLOGY | Facility: CLINIC | Age: 89
Discharge: HOME | End: 2024-08-28
Payer: MEDICARE

## 2024-08-28 DIAGNOSIS — M25.511 ACUTE PAIN OF RIGHT SHOULDER: Primary | ICD-10-CM

## 2024-08-28 DIAGNOSIS — M25.511 ACUTE PAIN OF RIGHT SHOULDER: ICD-10-CM

## 2024-08-28 PROCEDURE — 73030 X-RAY EXAM OF SHOULDER: CPT | Mod: RT

## 2024-08-28 PROCEDURE — 85652 RBC SED RATE AUTOMATED: CPT

## 2024-08-28 PROCEDURE — 73030 X-RAY EXAM OF SHOULDER: CPT | Mod: RIGHT SIDE | Performed by: RADIOLOGY

## 2024-08-28 PROCEDURE — 85025 COMPLETE CBC W/AUTO DIFF WBC: CPT

## 2024-09-03 ENCOUNTER — APPOINTMENT (OUTPATIENT)
Dept: OCCUPATIONAL THERAPY | Facility: CLINIC | Age: 89
End: 2024-09-03
Payer: MEDICARE

## 2024-09-04 ENCOUNTER — APPOINTMENT (OUTPATIENT)
Dept: CARDIOLOGY | Facility: CLINIC | Age: 89
End: 2024-09-04
Payer: MEDICARE

## 2024-09-04 ENCOUNTER — APPOINTMENT (OUTPATIENT)
Dept: RADIOLOGY | Facility: CLINIC | Age: 89
End: 2024-09-04
Payer: MEDICARE

## 2024-09-10 ENCOUNTER — HOSPITAL ENCOUNTER (EMERGENCY)
Facility: HOSPITAL | Age: 89
Discharge: HOME | End: 2024-09-10
Payer: MEDICARE

## 2024-09-10 ENCOUNTER — APPOINTMENT (OUTPATIENT)
Dept: CARDIOLOGY | Facility: HOSPITAL | Age: 89
End: 2024-09-10
Payer: MEDICARE

## 2024-09-10 ENCOUNTER — APPOINTMENT (OUTPATIENT)
Dept: RADIOLOGY | Facility: HOSPITAL | Age: 89
End: 2024-09-10
Payer: MEDICARE

## 2024-09-10 ENCOUNTER — APPOINTMENT (OUTPATIENT)
Dept: OCCUPATIONAL THERAPY | Facility: CLINIC | Age: 89
End: 2024-09-10
Payer: MEDICARE

## 2024-09-10 VITALS
TEMPERATURE: 99.1 F | HEIGHT: 64 IN | OXYGEN SATURATION: 100 % | DIASTOLIC BLOOD PRESSURE: 87 MMHG | WEIGHT: 145 LBS | RESPIRATION RATE: 18 BRPM | SYSTOLIC BLOOD PRESSURE: 161 MMHG | BODY MASS INDEX: 24.75 KG/M2 | HEART RATE: 94 BPM

## 2024-09-10 DIAGNOSIS — U07.1 COVID-19: Primary | ICD-10-CM

## 2024-09-10 LAB
ALBUMIN SERPL-MCNC: 3.8 G/DL (ref 3.5–5)
ALP BLD-CCNC: 128 U/L (ref 35–125)
ALT SERPL-CCNC: 21 U/L (ref 5–40)
ANION GAP SERPL CALC-SCNC: 14 MMOL/L
AST SERPL-CCNC: 33 U/L (ref 5–40)
BASOPHILS # BLD AUTO: 0.01 X10*3/UL (ref 0–0.1)
BASOPHILS NFR BLD AUTO: 0.4 %
BILIRUB SERPL-MCNC: 0.5 MG/DL (ref 0.1–1.2)
BUN SERPL-MCNC: 21 MG/DL (ref 8–25)
CALCIUM SERPL-MCNC: 8.8 MG/DL (ref 8.5–10.4)
CHLORIDE SERPL-SCNC: 92 MMOL/L (ref 97–107)
CO2 SERPL-SCNC: 22 MMOL/L (ref 24–31)
CREAT SERPL-MCNC: 0.9 MG/DL (ref 0.4–1.6)
EGFRCR SERPLBLD CKD-EPI 2021: 62 ML/MIN/1.73M*2
EOSINOPHIL # BLD AUTO: 0.01 X10*3/UL (ref 0–0.4)
EOSINOPHIL NFR BLD AUTO: 0.4 %
ERYTHROCYTE [DISTWIDTH] IN BLOOD BY AUTOMATED COUNT: 14.8 % (ref 11.5–14.5)
FLUAV RNA RESP QL NAA+PROBE: NOT DETECTED
FLUBV RNA RESP QL NAA+PROBE: NOT DETECTED
GLUCOSE SERPL-MCNC: 104 MG/DL (ref 65–99)
HCT VFR BLD AUTO: 33.1 % (ref 36–46)
HGB BLD-MCNC: 11.6 G/DL (ref 12–16)
IMM GRANULOCYTES # BLD AUTO: 0.03 X10*3/UL (ref 0–0.5)
IMM GRANULOCYTES NFR BLD AUTO: 1.2 % (ref 0–0.9)
LYMPHOCYTES # BLD AUTO: 0.53 X10*3/UL (ref 0.8–3)
LYMPHOCYTES NFR BLD AUTO: 21 %
MCH RBC QN AUTO: 35.9 PG (ref 26–34)
MCHC RBC AUTO-ENTMCNC: 35 G/DL (ref 32–36)
MCV RBC AUTO: 103 FL (ref 80–100)
MONOCYTES # BLD AUTO: 0.37 X10*3/UL (ref 0.05–0.8)
MONOCYTES NFR BLD AUTO: 14.7 %
NEUTROPHILS # BLD AUTO: 1.57 X10*3/UL (ref 1.6–5.5)
NEUTROPHILS NFR BLD AUTO: 62.3 %
NRBC BLD-RTO: 0 /100 WBCS (ref 0–0)
PLATELET # BLD AUTO: 162 X10*3/UL (ref 150–450)
POTASSIUM SERPL-SCNC: 3.7 MMOL/L (ref 3.4–5.1)
PROT SERPL-MCNC: 6.5 G/DL (ref 5.9–7.9)
RBC # BLD AUTO: 3.23 X10*6/UL (ref 4–5.2)
SARS-COV-2 RNA RESP QL NAA+PROBE: DETECTED
SODIUM SERPL-SCNC: 128 MMOL/L (ref 133–145)
WBC # BLD AUTO: 2.5 X10*3/UL (ref 4.4–11.3)

## 2024-09-10 PROCEDURE — 2500000005 HC RX 250 GENERAL PHARMACY W/O HCPCS

## 2024-09-10 PROCEDURE — 71046 X-RAY EXAM CHEST 2 VIEWS: CPT

## 2024-09-10 PROCEDURE — 71046 X-RAY EXAM CHEST 2 VIEWS: CPT | Performed by: RADIOLOGY

## 2024-09-10 PROCEDURE — 2500000001 HC RX 250 WO HCPCS SELF ADMINISTERED DRUGS (ALT 637 FOR MEDICARE OP)

## 2024-09-10 PROCEDURE — 36415 COLL VENOUS BLD VENIPUNCTURE: CPT

## 2024-09-10 PROCEDURE — 99283 EMERGENCY DEPT VISIT LOW MDM: CPT

## 2024-09-10 PROCEDURE — 87636 SARSCOV2 & INF A&B AMP PRB: CPT

## 2024-09-10 PROCEDURE — 93005 ELECTROCARDIOGRAM TRACING: CPT

## 2024-09-10 PROCEDURE — 80053 COMPREHEN METABOLIC PANEL: CPT

## 2024-09-10 PROCEDURE — 85025 COMPLETE CBC W/AUTO DIFF WBC: CPT

## 2024-09-10 RX ORDER — GUAIFENESIN 100 MG/5ML
200 SOLUTION ORAL ONCE
Status: COMPLETED | OUTPATIENT
Start: 2024-09-10 | End: 2024-09-10

## 2024-09-10 ASSESSMENT — PAIN DESCRIPTION - PAIN TYPE: TYPE: ACUTE PAIN

## 2024-09-10 ASSESSMENT — PAIN - FUNCTIONAL ASSESSMENT: PAIN_FUNCTIONAL_ASSESSMENT: 0-10

## 2024-09-10 ASSESSMENT — PAIN DESCRIPTION - LOCATION: LOCATION: THROAT

## 2024-09-10 ASSESSMENT — PAIN SCALES - GENERAL: PAINLEVEL_OUTOF10: 7

## 2024-09-10 ASSESSMENT — COLUMBIA-SUICIDE SEVERITY RATING SCALE - C-SSRS
6. HAVE YOU EVER DONE ANYTHING, STARTED TO DO ANYTHING, OR PREPARED TO DO ANYTHING TO END YOUR LIFE?: NO
1. IN THE PAST MONTH, HAVE YOU WISHED YOU WERE DEAD OR WISHED YOU COULD GO TO SLEEP AND NOT WAKE UP?: NO
2. HAVE YOU ACTUALLY HAD ANY THOUGHTS OF KILLING YOURSELF?: NO

## 2024-09-10 ASSESSMENT — PAIN DESCRIPTION - DESCRIPTORS: DESCRIPTORS: ACHING;SORE

## 2024-09-10 NOTE — DISCHARGE INSTRUCTIONS
Please continue with supportive care at home with lozenges, hot tea with honey, DayQuil, NyQuil or cough suppressants for treatment of your symptoms.  Please return to ER if you develop any worsening of symptoms such as purulent sputum production, chest pain, or high fevers despite therapy.    It is important to remember that your care does not end here and you must continue to monitor your condition closely. Please return to the emergency department for any worsening or concerning signs or symptoms as directed by our conversations and the discharge instructions. If you do not have a doctor please contact the referral number on your discharge instructions. Please contact any physician specialists provided in your discharge notes as it is very important to follow up with them regarding your condition. If you are unable to reach the physicians provided, please come back to the Emergency Department at any time.

## 2024-09-10 NOTE — ED TRIAGE NOTES
Pt states that she tested positive for covid on Friday. Pt states that she is having increased sob. Pt pcp sent her in.

## 2024-09-10 NOTE — ED PROVIDER NOTES
HPI   Chief Complaint   Patient presents with    Shortness of Breath       HPI  Patient is an 89-year-old female presenting from [facility] for evaluation of COVID-19 with worsening symptoms.  Patient states that she was diagnosed with COVID on Friday 5 days ago and states that she opted not to get the Paxlovid and feels her symptoms are worsening.  She states that she is kept up at night due to a cough.  She states that she is bringing up clear frothy phlegm with her cough.  Contrary to triage note she does not really endorse shortness of breath but states that her chest has been sore from coughing.  She also endorses a sore throat though she states this is better with water.  States she has been taking a cough syrup which has helped her symptoms.  She states that she did call her primary care provider this morning because her symptoms have not improved and he told her to come to ER to evaluate for pneumonia.  Patient denies fever or chills.  Otherwise is well.      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  Social History     Tobacco Use    Smoking status: Never    Smokeless tobacco: Never   Vaping Use    Vaping status: Never Used   Substance Use Topics    Alcohol use: Not Currently    Drug use: Never       Physical Exam   ED Triage Vitals [09/10/24 0834]   Temperature Heart Rate  Respirations BP   37.3 °C (99.1 °F) 94 18 161/87      Pulse Ox Temp Source Heart Rate Source Patient Position   100 % Temporal Monitor --      BP Location FiO2 (%)     -- --       Physical Exam  Vitals and nursing note reviewed.   Constitutional:       General: She is not in acute distress.     Appearance: She is well-developed.      Comments: Well-appearing and pleasant 89-year-old female resting in exam chair in no apparent distress   HENT:      Head: Normocephalic and atraumatic.      Mouth/Throat:      Mouth: Mucous membranes are moist.      Pharynx: Oropharynx is clear.      Comments: Patient drinking water during this visit, no evidence of edema or ulcers or lesions in the mouth.  Eyes:      Conjunctiva/sclera: Conjunctivae normal.   Cardiovascular:      Rate and Rhythm: Normal rate and regular rhythm.      Heart sounds: No murmur heard.  Pulmonary:      Effort: Pulmonary effort is normal. No respiratory distress.      Breath sounds: Normal breath sounds.      Comments: Lungs clear to auscultation bilaterally  Abdominal:      Palpations: Abdomen is soft.      Tenderness: There is no abdominal tenderness.   Musculoskeletal:         General: No swelling.      Cervical back: Neck supple.      Right lower leg: No edema.      Left lower leg: No edema.   Skin:     General: Skin is warm and dry.      Capillary Refill: Capillary refill takes less than 2 seconds.   Neurological:      Mental Status: She is alert and oriented to person, place, and time.   Psychiatric:         Mood and Affect: Mood normal.           ED Course & MDM   ED Course as of 09/10/24 1541   Tue Sep 10, 2024   0943 I personally reviewed and interpreted the EKG @0934: NSR 80, normal axis/intervals and no appreciable ischemia, and prior EKG on 5/3/2023 reviewed without any appreciable specific/identifiable changes. [BC]      ED Course User Index  [BC] Scott Calvert MD         Diagnoses as of 09/10/24 1541   COVID-19                 No data  recorded     [place] Coma Scale Score: 15 (09/10/24 0832 : Ciara Austin RN)                           Medical Decision Making  Parts of this chart have been completed using voice recognition software. Please excuse any errors of transcription.  My thought process and reason for plan has been formulated from the time that I saw the patient until the time of disposition and is not specific to one specific moment during their visit and furthermore my MDM encompasses this entire chart and not only this text box.      HPI: Detailed above.    Exam: A medically appropriate exam performed, outlined above, given the known history and presentation.    History obtained from: Patient    EKG: Interpreted by attending physician and reviewed by me    Social Determinants of Health considered during this visit: Lives at senior living facility    Medications given during visit:  Medications   lidocaine-diphenhydrAMINE-Maalox 1:1:1 Magic Mouthwash (10 mL Swish & Swallow Given 9/10/24 0910)   guaiFENesin (Robitussin) 100 mg/5 mL syrup 200 mg (200 mg oral Given 9/10/24 0910)        Diagnostic/tests  Labs Reviewed   CBC WITH AUTO DIFFERENTIAL - Abnormal       Result Value    WBC 2.5 (*)     nRBC 0.0      RBC 3.23 (*)     Hemoglobin 11.6 (*)     Hematocrit 33.1 (*)      (*)     MCH 35.9 (*)     MCHC 35.0      RDW 14.8 (*)     Platelets 162      Neutrophils % 62.3      Immature Granulocytes %, Automated 1.2 (*)     Lymphocytes % 21.0      Monocytes % 14.7      Eosinophils % 0.4      Basophils % 0.4      Neutrophils Absolute 1.57 (*)     Immature Granulocytes Absolute, Automated 0.03      Lymphocytes Absolute 0.53 (*)     Monocytes Absolute 0.37      Eosinophils Absolute 0.01      Basophils Absolute 0.01     COMPREHENSIVE METABOLIC PANEL - Abnormal    Glucose 104 (*)     Sodium 128 (*)     Potassium 3.7      Chloride 92 (*)     Bicarbonate 22 (*)     Urea Nitrogen 21      Creatinine 0.90      eGFR 62      Calcium 8.8       Albumin 3.8      Alkaline Phosphatase 128 (*)     Total Protein 6.5      AST 33      Bilirubin, Total 0.5      ALT 21      Anion Gap 14     SARS-COV-2 PCR - Abnormal    Coronavirus 2019, PCR Detected (*)     Narrative:     This assay has received FDA Emergency Use Authorization (EUA) and is only authorized for the duration of time that circumstances exist to justify the authorization of the emergency use of in vitro diagnostic tests for the detection of SARS-CoV-2 virus and/or diagnosis of COVID-19 infection under section 564(b)(1) of the Act, 21 U.S.C. 360bbb-3(b)(1). This assay is an in vitro diagnostic nucleic acid amplification test for the qualitative detection of SARS-CoV-2 from nasopharyngeal specimens and has been validated for use at [facility]. Negative results do not preclude COVID-19 infections and should not be used as the sole basis for diagnosis, treatment, or other management decisions.     INFLUENZA A AND B PCR - Normal    Flu A Result Not Detected      Flu B Result Not Detected      Narrative:     This assay is an in vitro diagnostic multiplex nucleic acid amplification test for the detection and discrimination of Influenza A & B from nasopharyngeal specimens, and has been validated for use at [facility]. Negative results do not preclude Influenza A/B infections, and should not be used as the sole basis for diagnosis, treatment, or other management decisions. If Influenza A/B and RSV PCR results are negative, testing for Parainfluenza virus, Adenovirus and Metapneumovirus is routinely performed for [facility] pediatric oncology and intensive care inpatients, and is available on other patients by placing an add-on request.      XR chest 2 views   Final Result   1.  No evidence of acute cardiopulmonary process.             Signed by: Jose Schofield 9/10/2024 10:39 AM   Dictation workstation:   [id number]           Considerations/further MDM:  Patient is a  89-year-old female presenting for evaluation of COVID-19, cough, sore throat    Patient is well-appearing and hemodynamically stable during the visit.  Vital signs are with normal blood pressure, normal heart rate and patient is satting 100% on room air.  Patient is afebrile.  She is tolerating oral liquids during the visit without difficulty.  Patient is testing positive for COVID states she is 5 days into her illness.  She was provided symptomatic treatment during the visit.  No significant leukocytosis or electrolyte abnormality on laboratory workup.  Chest x-ray is without evidence of pneumonia.  EKG is without evidence of acute ischemia.  I did have a shared decision-making discussion with the patient regarding observation of her illness versus discharge back to her facility patient does prefer discharge back to her facility at this time and feels she can continue with supportive care at home.  Encouraged to continue hot teas, honey, DayQuil, NyQuil, cough suppressants, Tylenol and Motrin.  Patient is agreeable with this plan of care.  Strict return precautions were discussed including high fevers or change in mucus production.    I discussed the laboratory and imaging findings with the patient at bedside. Patient's questions and concerns were addressed. Patient was released in good condition, discharged with instructions to follow up with primary care provider and appropriate specialist, and to return to ED at any time for worsening symptoms or any other concerns. Patient demonstrates understanding of the findings and the importance of appropriate follow up care.         Procedure  Procedures     Virginia Castillo PA-C  09/10/24 1544

## 2024-09-13 ENCOUNTER — APPOINTMENT (OUTPATIENT)
Dept: RADIOLOGY | Facility: CLINIC | Age: 89
End: 2024-09-13
Payer: MEDICARE

## 2024-09-15 LAB
ATRIAL RATE: 80 BPM
P AXIS: 16 DEGREES
P OFFSET: 196 MS
P ONSET: 140 MS
PR INTERVAL: 160 MS
Q ONSET: 220 MS
QRS COUNT: 13 BEATS
QRS DURATION: 80 MS
QT INTERVAL: 390 MS
QTC CALCULATION(BAZETT): 449 MS
QTC FREDERICIA: 429 MS
R AXIS: -4 DEGREES
T AXIS: 21 DEGREES
T OFFSET: 415 MS
VENTRICULAR RATE: 80 BPM

## 2024-09-17 ENCOUNTER — APPOINTMENT (OUTPATIENT)
Dept: OCCUPATIONAL THERAPY | Facility: CLINIC | Age: 89
End: 2024-09-17
Payer: MEDICARE

## 2024-09-19 ENCOUNTER — APPOINTMENT (OUTPATIENT)
Dept: OBSTETRICS AND GYNECOLOGY | Facility: CLINIC | Age: 89
End: 2024-09-19
Payer: MEDICARE

## 2024-09-23 ENCOUNTER — APPOINTMENT (OUTPATIENT)
Dept: OCCUPATIONAL THERAPY | Facility: CLINIC | Age: 89
End: 2024-09-23
Payer: MEDICARE

## 2024-09-25 ENCOUNTER — HOSPITAL ENCOUNTER (OUTPATIENT)
Dept: RADIOLOGY | Facility: CLINIC | Age: 89
Discharge: HOME | End: 2024-09-25
Payer: MEDICARE

## 2024-09-25 ENCOUNTER — APPOINTMENT (OUTPATIENT)
Dept: RADIOLOGY | Facility: CLINIC | Age: 89
End: 2024-09-25
Payer: MEDICARE

## 2024-09-25 ENCOUNTER — TREATMENT (OUTPATIENT)
Dept: OCCUPATIONAL THERAPY | Facility: CLINIC | Age: 89
End: 2024-09-25
Payer: MEDICARE

## 2024-09-25 DIAGNOSIS — U09.9 POST-COVID CHRONIC COUGH: ICD-10-CM

## 2024-09-25 DIAGNOSIS — R05.1 ACUTE COUGH: Primary | ICD-10-CM

## 2024-09-25 DIAGNOSIS — R05.3 POST-COVID CHRONIC COUGH: ICD-10-CM

## 2024-09-25 DIAGNOSIS — R05.1 ACUTE COUGH: ICD-10-CM

## 2024-09-25 DIAGNOSIS — M65.332 TRIGGER MIDDLE FINGER OF LEFT HAND: Primary | ICD-10-CM

## 2024-09-25 PROCEDURE — 71046 X-RAY EXAM CHEST 2 VIEWS: CPT | Performed by: RADIOLOGY

## 2024-09-25 PROCEDURE — 97112 NEUROMUSCULAR REEDUCATION: CPT | Mod: GO | Performed by: OCCUPATIONAL THERAPIST

## 2024-09-25 PROCEDURE — 71046 X-RAY EXAM CHEST 2 VIEWS: CPT

## 2024-09-25 PROCEDURE — 97110 THERAPEUTIC EXERCISES: CPT | Mod: GO | Performed by: OCCUPATIONAL THERAPIST

## 2024-09-25 RX ORDER — BENZONATATE 200 MG/1
200 CAPSULE ORAL 3 TIMES DAILY PRN
COMMUNITY
End: 2024-09-25 | Stop reason: SDUPTHER

## 2024-09-25 RX ORDER — BENZONATATE 200 MG/1
200 CAPSULE ORAL 3 TIMES DAILY PRN
Qty: 20 CAPSULE | Refills: 0 | Status: SHIPPED | OUTPATIENT
Start: 2024-09-25

## 2024-09-25 NOTE — PROGRESS NOTES
"    Occupational Therapy Orthopedic Treatment and Discharge    Patient Name: Michell Medrano  MRN: [id number]  Today's Date: 9/25/2024  Time Calculation  Start Time: 0130  Stop Time: 0210  Time Calculation (min): 40 min  Neuro re-ed: 13 min  Therapeutic ex: 25 min    Insurance:  Visit number: 4 of [place]  Authorization info: none required  Insurance Type: Medicare and [employer] [place] supplement  Medicare certification  -  Start: 8/16/24  End:  10/11/24    Current Problem  1. Trigger middle finger of left hand            Referred by: Lisa High Pa-C  Referred for: L MF/R trigger release  DOS: 6/12/24    Fall risk:  Low  Precautions:  None     Medical History Form: Reviewed (scanned into chart)    Subjective   \"My finger is doing really well.  I still feel it a little bit. There is nothing I can't do and it doesn't bother me at all\"    Hand Dominance: Right    Pain:  2-3/10  Location: medial lateral aspect of RF and MF PIPJ   Description: nerve pain    Objective   HAND STRENGTH (Lbs)   R L   Dynamometer  20 18       LEFT HAND AROM (Degrees)   MCP PIP DIP SHELL DPC   IF         MF 0/80 0/105 0/60     RF 0/90 0/105 0/65     SF            Outcome Measures:         QUICK DASH:      Home Program:  Exercise Sets/Reps Comments   AROM tendon gliding     Desensitization     Scar massage     Heat                                     Treatment Today  Neuro re-ed:  - tendon gliding AROM within fluidotherapy for desensitization and re-ed of digital proprioception x 13min    Therapeutic ex:  - red flexbar sup/pron to increase  strength 4x10  - green putty finger ADD to increase intrinsic strength  - graded clothespins to increase pinch strength x 5 min  - ASL AROM to promote tendon excursion x 5 min  - reviewed HEP to ensure carryover to home x 5 min      EDUCATION: Home exercise program, plan of care, activity modifications, joint protection, pain management, and injury pathology.       Assessment:   Tolerated all gentle resistance " well without reactivity. She has improved  strength and has excellent understanding of her HEP to continue progressing independently.      Level of Complexity  LOW complexity    OT Rehab Potential  Excellent      Plan:     discharge    Goals: Set and discussed today         1) Patient will demo at least 5 pounds improved left  strength for weightbearing tolerance on her walker, in 4 weeks.    GOAL MET         2) Patient well report no more than 2/10 L finger pain with gross grasping, in 4 weeks    GOAL MET         3) Patient will correctly return demo of desensitization techniques to ensure carryover to home, in 4 weeks    GOAL MET      Plan of care was developed with input and agreement by the patient.      Ne Bush, OT

## 2024-10-02 ENCOUNTER — APPOINTMENT (OUTPATIENT)
Dept: CARDIOLOGY | Facility: CLINIC | Age: 89
End: 2024-10-02
Payer: MEDICARE

## 2024-10-04 ENCOUNTER — APPOINTMENT (OUTPATIENT)
Dept: RADIOLOGY | Facility: CLINIC | Age: 89
End: 2024-10-04
Payer: MEDICARE

## 2024-10-29 ENCOUNTER — APPOINTMENT (OUTPATIENT)
Dept: RADIOLOGY | Facility: CLINIC | Age: 89
End: 2024-10-29
Payer: MEDICARE

## 2024-11-07 ENCOUNTER — LAB REQUISITION (OUTPATIENT)
Dept: LAB | Facility: LAB | Age: 89
End: 2024-11-07
Payer: MEDICARE

## 2024-11-07 DIAGNOSIS — D50.9 IRON DEFICIENCY ANEMIA, UNSPECIFIED: ICD-10-CM

## 2024-11-07 LAB
ANION GAP SERPL CALC-SCNC: 15 MMOL/L (ref 10–20)
BASOPHILS # BLD AUTO: 0.04 X10*3/UL (ref 0–0.1)
BASOPHILS NFR BLD AUTO: 0.7 %
BUN SERPL-MCNC: 26 MG/DL (ref 6–23)
CALCIUM SERPL-MCNC: 8.2 MG/DL (ref 8.6–10.6)
CHLORIDE SERPL-SCNC: 99 MMOL/L (ref 98–107)
CO2 SERPL-SCNC: 22 MMOL/L (ref 21–32)
CREAT SERPL-MCNC: 0.94 MG/DL (ref 0.5–1.05)
EGFRCR SERPLBLD CKD-EPI 2021: 58 ML/MIN/1.73M*2
EOSINOPHIL # BLD AUTO: 0.35 X10*3/UL (ref 0–0.4)
EOSINOPHIL NFR BLD AUTO: 5.8 %
ERYTHROCYTE [DISTWIDTH] IN BLOOD BY AUTOMATED COUNT: 15.9 % (ref 11.5–14.5)
FERRITIN SERPL-MCNC: 780 NG/ML (ref 8–150)
GLUCOSE SERPL-MCNC: 110 MG/DL (ref 74–99)
HCT VFR BLD AUTO: 24.8 % (ref 36–46)
HGB BLD-MCNC: 8 G/DL (ref 12–16)
IMM GRANULOCYTES # BLD AUTO: 0.04 X10*3/UL (ref 0–0.5)
IMM GRANULOCYTES NFR BLD AUTO: 0.7 % (ref 0–0.9)
IRON SATN MFR SERPL: 17 % (ref 25–45)
IRON SERPL-MCNC: 41 UG/DL (ref 35–150)
LYMPHOCYTES # BLD AUTO: 1.72 X10*3/UL (ref 0.8–3)
LYMPHOCYTES NFR BLD AUTO: 28.4 %
MCH RBC QN AUTO: 34.2 PG (ref 26–34)
MCHC RBC AUTO-ENTMCNC: 32.3 G/DL (ref 32–36)
MCV RBC AUTO: 106 FL (ref 80–100)
MONOCYTES # BLD AUTO: 0.58 X10*3/UL (ref 0.05–0.8)
MONOCYTES NFR BLD AUTO: 9.6 %
NEUTROPHILS # BLD AUTO: 3.33 X10*3/UL (ref 1.6–5.5)
NEUTROPHILS NFR BLD AUTO: 54.8 %
NRBC BLD-RTO: 0 /100 WBCS (ref 0–0)
PLATELET # BLD AUTO: 248 X10*3/UL (ref 150–450)
POTASSIUM SERPL-SCNC: 4.1 MMOL/L (ref 3.5–5.3)
RBC # BLD AUTO: 2.34 X10*6/UL (ref 4–5.2)
SODIUM SERPL-SCNC: 132 MMOL/L (ref 136–145)
TIBC SERPL-MCNC: 242 UG/DL (ref 240–445)
UIBC SERPL-MCNC: 201 UG/DL (ref 110–370)
WBC # BLD AUTO: 6.1 X10*3/UL (ref 4.4–11.3)

## 2024-11-07 PROCEDURE — 83550 IRON BINDING TEST: CPT

## 2024-11-07 PROCEDURE — 83540 ASSAY OF IRON: CPT

## 2024-11-07 PROCEDURE — 82728 ASSAY OF FERRITIN: CPT

## 2024-11-07 PROCEDURE — 80048 BASIC METABOLIC PNL TOTAL CA: CPT

## 2024-11-07 PROCEDURE — 85025 COMPLETE CBC W/AUTO DIFF WBC: CPT

## 2024-11-21 ENCOUNTER — APPOINTMENT (OUTPATIENT)
Dept: RADIOLOGY | Facility: HOSPITAL | Age: 89
End: 2024-11-21
Payer: MEDICARE

## 2024-11-21 ENCOUNTER — APPOINTMENT (OUTPATIENT)
Dept: CARDIOLOGY | Facility: HOSPITAL | Age: 89
End: 2024-11-21
Payer: MEDICARE

## 2024-11-21 ENCOUNTER — HOSPITAL ENCOUNTER (OUTPATIENT)
Facility: HOSPITAL | Age: 89
Setting detail: OBSERVATION
Discharge: HOME | End: 2024-11-23
Attending: INTERNAL MEDICINE | Admitting: INTERNAL MEDICINE
Payer: MEDICARE

## 2024-11-21 DIAGNOSIS — R42 DIZZINESS: ICD-10-CM

## 2024-11-21 DIAGNOSIS — D64.9 SYMPTOMATIC ANEMIA: Primary | ICD-10-CM

## 2024-11-21 DIAGNOSIS — I48.3 TYPICAL ATRIAL FLUTTER (MULTI): ICD-10-CM

## 2024-11-21 DIAGNOSIS — E55.9 VITAMIN D DEFICIENCY: ICD-10-CM

## 2024-11-21 DIAGNOSIS — I48.0 PAF (PAROXYSMAL ATRIAL FIBRILLATION) (MULTI): ICD-10-CM

## 2024-11-21 DIAGNOSIS — T78.40XA ALLERGIC REACTION, INITIAL ENCOUNTER: ICD-10-CM

## 2024-11-21 DIAGNOSIS — R06.02 SHORTNESS OF BREATH: ICD-10-CM

## 2024-11-21 DIAGNOSIS — M54.2 NECK DISCOMFORT: ICD-10-CM

## 2024-11-21 DIAGNOSIS — M06.9 RHEUMATOID ARTHRITIS, INVOLVING UNSPECIFIED SITE, UNSPECIFIED WHETHER RHEUMATOID FACTOR PRESENT (MULTI): ICD-10-CM

## 2024-11-21 PROBLEM — K86.2 PANCREATIC CYST (HHS-HCC): Status: ACTIVE | Noted: 2024-10-08

## 2024-11-21 PROBLEM — C50.419 CANCER OF UPPER-OUTER QUADRANT OF FEMALE BREAST: Status: ACTIVE | Noted: 2024-11-21

## 2024-11-21 PROBLEM — I87.2 VENOUS INSUFFICIENCY: Status: RESOLVED | Noted: 2023-09-27 | Resolved: 2024-11-21

## 2024-11-21 PROBLEM — I10 BENIGN ESSENTIAL HTN: Status: ACTIVE | Noted: 2024-11-21

## 2024-11-21 PROBLEM — M79.3 LIPODERMATOSCLEROSIS OF BOTH LOWER EXTREMITIES: Status: RESOLVED | Noted: 2023-09-27 | Resolved: 2024-11-21

## 2024-11-21 PROBLEM — M10.9 GOUT: Status: ACTIVE | Noted: 2024-11-21

## 2024-11-21 PROBLEM — I25.10 CAD (CORONARY ARTERY DISEASE): Status: ACTIVE | Noted: 2024-11-21

## 2024-11-21 PROBLEM — K21.9 GERD (GASTROESOPHAGEAL REFLUX DISEASE): Status: ACTIVE | Noted: 2024-11-21

## 2024-11-21 LAB
ABO GROUP (TYPE) IN BLOOD: NORMAL
ABO GROUP (TYPE) IN BLOOD: NORMAL
ALBUMIN SERPL BCP-MCNC: 3.2 G/DL (ref 3.4–5)
ALP SERPL-CCNC: 106 U/L (ref 33–136)
ALT SERPL W P-5'-P-CCNC: 15 U/L (ref 7–45)
ANION GAP SERPL CALC-SCNC: 13 MMOL/L (ref 10–20)
ANTIBODY SCREEN: NORMAL
AST SERPL W P-5'-P-CCNC: 24 U/L (ref 9–39)
ATRIAL RATE: 88 BPM
BASOPHILS # BLD AUTO: 0.02 X10*3/UL (ref 0–0.1)
BASOPHILS NFR BLD AUTO: 0.4 %
BILIRUB SERPL-MCNC: 0.7 MG/DL (ref 0–1.2)
BLOOD EXPIRATION DATE: NORMAL
BUN SERPL-MCNC: 22 MG/DL (ref 6–23)
CALCIUM SERPL-MCNC: 7.9 MG/DL (ref 8.6–10.3)
CARDIAC TROPONIN I PNL SERPL HS: 16 NG/L (ref 0–13)
CHLORIDE SERPL-SCNC: 102 MMOL/L (ref 98–107)
CO2 SERPL-SCNC: 22 MMOL/L (ref 21–32)
CREAT SERPL-MCNC: 0.92 MG/DL (ref 0.5–1.05)
DISPENSE STATUS: NORMAL
EGFRCR SERPLBLD CKD-EPI 2021: 60 ML/MIN/1.73M*2
EOSINOPHIL # BLD AUTO: 0.13 X10*3/UL (ref 0–0.4)
EOSINOPHIL NFR BLD AUTO: 2.6 %
ERYTHROCYTE [DISTWIDTH] IN BLOOD BY AUTOMATED COUNT: 16.4 % (ref 11.5–14.5)
GLUCOSE SERPL-MCNC: 133 MG/DL (ref 74–99)
HCT VFR BLD AUTO: 21.6 % (ref 36–46)
HEMOCCULT SP1 STL QL: POSITIVE
HGB BLD-MCNC: 6.9 G/DL (ref 12–16)
HGB RETIC QN: 33 PG (ref 28–38)
IMM GRANULOCYTES # BLD AUTO: 0.02 X10*3/UL (ref 0–0.5)
IMM GRANULOCYTES NFR BLD AUTO: 0.4 % (ref 0–0.9)
IMMATURE RETIC FRACTION: 31.8 %
LYMPHOCYTES # BLD AUTO: 2.11 X10*3/UL (ref 0.8–3)
LYMPHOCYTES NFR BLD AUTO: 42.5 %
MCH RBC QN AUTO: 34.3 PG (ref 26–34)
MCHC RBC AUTO-ENTMCNC: 31.9 G/DL (ref 32–36)
MCV RBC AUTO: 108 FL (ref 80–100)
MONOCYTES # BLD AUTO: 0.39 X10*3/UL (ref 0.05–0.8)
MONOCYTES NFR BLD AUTO: 7.8 %
NEUTROPHILS # BLD AUTO: 2.3 X10*3/UL (ref 1.6–5.5)
NEUTROPHILS NFR BLD AUTO: 46.3 %
NRBC BLD-RTO: 0 /100 WBCS (ref 0–0)
P AXIS: 59 DEGREES
P OFFSET: 191 MS
P ONSET: 120 MS
PLATELET # BLD AUTO: 224 X10*3/UL (ref 150–450)
POTASSIUM SERPL-SCNC: 3.6 MMOL/L (ref 3.5–5.3)
PR INTERVAL: 194 MS
PRODUCT BLOOD TYPE: 6200
PRODUCT CODE: NORMAL
PROT SERPL-MCNC: 5.8 G/DL (ref 6.4–8.2)
Q ONSET: 217 MS
QRS COUNT: 14 BEATS
QRS DURATION: 92 MS
QT INTERVAL: 416 MS
QTC CALCULATION(BAZETT): 503 MS
QTC FREDERICIA: 472 MS
R AXIS: 28 DEGREES
RBC # BLD AUTO: 2.01 X10*6/UL (ref 4–5.2)
RETICS #: 0.15 X10*6/UL (ref 0.02–0.11)
RETICS/RBC NFR AUTO: 5.6 % (ref 0.5–2)
RH FACTOR (ANTIGEN D): NORMAL
RH FACTOR (ANTIGEN D): NORMAL
SODIUM SERPL-SCNC: 133 MMOL/L (ref 136–145)
T AXIS: 55 DEGREES
T OFFSET: 425 MS
TSH SERPL-ACNC: 2.48 MIU/L (ref 0.44–3.98)
UNIT ABO: NORMAL
UNIT NUMBER: NORMAL
UNIT RH: NORMAL
UNIT VOLUME: 350
VENTRICULAR RATE: 88 BPM
WBC # BLD AUTO: 5 X10*3/UL (ref 4.4–11.3)
XM INTEP: NORMAL

## 2024-11-21 PROCEDURE — 2500000005 HC RX 250 GENERAL PHARMACY W/O HCPCS: Performed by: INTERNAL MEDICINE

## 2024-11-21 PROCEDURE — 2500000004 HC RX 250 GENERAL PHARMACY W/ HCPCS (ALT 636 FOR OP/ED): Performed by: NURSE PRACTITIONER

## 2024-11-21 PROCEDURE — 2550000001 HC RX 255 CONTRASTS: Performed by: INTERNAL MEDICINE

## 2024-11-21 PROCEDURE — 2500000004 HC RX 250 GENERAL PHARMACY W/ HCPCS (ALT 636 FOR OP/ED)

## 2024-11-21 PROCEDURE — 31575 DIAGNOSTIC LARYNGOSCOPY: CPT

## 2024-11-21 PROCEDURE — 85045 AUTOMATED RETICULOCYTE COUNT: CPT | Performed by: NURSE PRACTITIONER

## 2024-11-21 PROCEDURE — 82270 OCCULT BLOOD FECES: CPT | Performed by: INTERNAL MEDICINE

## 2024-11-21 PROCEDURE — 36415 COLL VENOUS BLD VENIPUNCTURE: CPT | Performed by: INTERNAL MEDICINE

## 2024-11-21 PROCEDURE — 2500000001 HC RX 250 WO HCPCS SELF ADMINISTERED DRUGS (ALT 637 FOR MEDICARE OP): Performed by: NURSE PRACTITIONER

## 2024-11-21 PROCEDURE — G0378 HOSPITAL OBSERVATION PER HR: HCPCS

## 2024-11-21 PROCEDURE — 70491 CT SOFT TISSUE NECK W/DYE: CPT

## 2024-11-21 PROCEDURE — 93005 ELECTROCARDIOGRAM TRACING: CPT

## 2024-11-21 PROCEDURE — 82607 VITAMIN B-12: CPT | Mod: AHULAB | Performed by: NURSE PRACTITIONER

## 2024-11-21 PROCEDURE — 99285 EMERGENCY DEPT VISIT HI MDM: CPT | Mod: 25

## 2024-11-21 PROCEDURE — 84484 ASSAY OF TROPONIN QUANT: CPT | Performed by: INTERNAL MEDICINE

## 2024-11-21 PROCEDURE — 71045 X-RAY EXAM CHEST 1 VIEW: CPT | Performed by: RADIOLOGY

## 2024-11-21 PROCEDURE — 31622 DX BRONCHOSCOPE/WASH: CPT

## 2024-11-21 PROCEDURE — P9016 RBC LEUKOCYTES REDUCED: HCPCS

## 2024-11-21 PROCEDURE — 36430 TRANSFUSION BLD/BLD COMPNT: CPT | Mod: 59

## 2024-11-21 PROCEDURE — 86923 COMPATIBILITY TEST ELECTRIC: CPT

## 2024-11-21 PROCEDURE — 80053 COMPREHEN METABOLIC PANEL: CPT | Performed by: INTERNAL MEDICINE

## 2024-11-21 PROCEDURE — 96375 TX/PRO/DX INJ NEW DRUG ADDON: CPT | Mod: 59

## 2024-11-21 PROCEDURE — 96374 THER/PROPH/DIAG INJ IV PUSH: CPT | Mod: 59

## 2024-11-21 PROCEDURE — 70491 CT SOFT TISSUE NECK W/DYE: CPT | Performed by: RADIOLOGY

## 2024-11-21 PROCEDURE — 86850 RBC ANTIBODY SCREEN: CPT | Performed by: INTERNAL MEDICINE

## 2024-11-21 PROCEDURE — 82746 ASSAY OF FOLIC ACID SERUM: CPT | Mod: AHULAB | Performed by: NURSE PRACTITIONER

## 2024-11-21 PROCEDURE — 84443 ASSAY THYROID STIM HORMONE: CPT | Performed by: NURSE PRACTITIONER

## 2024-11-21 PROCEDURE — 85025 COMPLETE CBC W/AUTO DIFF WBC: CPT | Performed by: INTERNAL MEDICINE

## 2024-11-21 PROCEDURE — 71045 X-RAY EXAM CHEST 1 VIEW: CPT

## 2024-11-21 RX ORDER — DIPHENHYDRAMINE HYDROCHLORIDE 50 MG/ML
50 INJECTION INTRAMUSCULAR; INTRAVENOUS ONCE
Status: COMPLETED | OUTPATIENT
Start: 2024-11-21 | End: 2024-11-21

## 2024-11-21 RX ORDER — FLECAINIDE ACETATE 50 MG/1
50 TABLET ORAL 2 TIMES DAILY
Status: DISCONTINUED | OUTPATIENT
Start: 2024-11-21 | End: 2024-11-22

## 2024-11-21 RX ORDER — ALLOPURINOL 100 MG/1
200 TABLET ORAL DAILY
Status: DISCONTINUED | OUTPATIENT
Start: 2024-11-21 | End: 2024-11-23 | Stop reason: HOSPADM

## 2024-11-21 RX ORDER — PANTOPRAZOLE SODIUM 40 MG/10ML
40 INJECTION, POWDER, LYOPHILIZED, FOR SOLUTION INTRAVENOUS 2 TIMES DAILY
Status: DISCONTINUED | OUTPATIENT
Start: 2024-11-22 | End: 2024-11-22

## 2024-11-21 RX ORDER — DILTIAZEM HYDROCHLORIDE 120 MG/1
120 CAPSULE, COATED, EXTENDED RELEASE ORAL DAILY
Status: DISCONTINUED | OUTPATIENT
Start: 2024-11-21 | End: 2024-11-21

## 2024-11-21 RX ORDER — POLYETHYLENE GLYCOL 3350 17 G/17G
17 POWDER, FOR SOLUTION ORAL DAILY PRN
Status: DISCONTINUED | OUTPATIENT
Start: 2024-11-21 | End: 2024-11-23 | Stop reason: HOSPADM

## 2024-11-21 RX ORDER — DILTIAZEM HYDROCHLORIDE 120 MG/1
1 TABLET, FILM COATED ORAL
Status: ON HOLD | COMMUNITY
Start: 2024-10-31 | End: 2024-11-22 | Stop reason: ENTERED-IN-ERROR

## 2024-11-21 RX ORDER — TALC
3 POWDER (GRAM) TOPICAL NIGHTLY PRN
Status: DISCONTINUED | OUTPATIENT
Start: 2024-11-21 | End: 2024-11-23 | Stop reason: HOSPADM

## 2024-11-21 RX ORDER — FAMOTIDINE 10 MG/ML
20 INJECTION INTRAVENOUS ONCE
Status: COMPLETED | OUTPATIENT
Start: 2024-11-21 | End: 2024-11-21

## 2024-11-21 RX ORDER — TALC
2 POWDER (GRAM) TOPICAL NIGHTLY
Status: ON HOLD | COMMUNITY
Start: 2024-10-16 | End: 2024-11-22 | Stop reason: ENTERED-IN-ERROR

## 2024-11-21 RX ORDER — FUROSEMIDE 20 MG/1
1 TABLET ORAL
COMMUNITY
Start: 2024-11-15 | End: 2024-11-21 | Stop reason: ALTCHOICE

## 2024-11-21 RX ORDER — FOLIC ACID 1 MG/1
1 TABLET ORAL
Status: ON HOLD | COMMUNITY
Start: 2024-10-13 | End: 2024-11-22 | Stop reason: ENTERED-IN-ERROR

## 2024-11-21 RX ORDER — PANTOPRAZOLE SODIUM 40 MG/10ML
80 INJECTION, POWDER, LYOPHILIZED, FOR SOLUTION INTRAVENOUS ONCE
Status: COMPLETED | OUTPATIENT
Start: 2024-11-21 | End: 2024-11-21

## 2024-11-21 RX ORDER — PANTOPRAZOLE SODIUM 40 MG/1
40 TABLET, DELAYED RELEASE ORAL
Status: DISCONTINUED | OUTPATIENT
Start: 2024-11-25 | End: 2024-11-22

## 2024-11-21 RX ORDER — GABAPENTIN 300 MG/1
300 CAPSULE ORAL NIGHTLY
Status: DISCONTINUED | OUTPATIENT
Start: 2024-11-21 | End: 2024-11-23 | Stop reason: HOSPADM

## 2024-11-21 RX ORDER — PANTOPRAZOLE SODIUM 20 MG/1
20 TABLET, DELAYED RELEASE ORAL
COMMUNITY
Start: 2024-10-12

## 2024-11-21 RX ORDER — DIPHENHYDRAMINE HYDROCHLORIDE 50 MG/ML
INJECTION INTRAMUSCULAR; INTRAVENOUS
Status: COMPLETED
Start: 2024-11-21 | End: 2024-11-21

## 2024-11-21 RX ORDER — FLECAINIDE ACETATE 50 MG/1
50 TABLET ORAL 2 TIMES DAILY
COMMUNITY
End: 2024-11-23 | Stop reason: HOSPADM

## 2024-11-21 RX ORDER — ACETAMINOPHEN 325 MG/1
650 TABLET ORAL EVERY 6 HOURS PRN
COMMUNITY
Start: 2024-10-12

## 2024-11-21 RX ORDER — BISACODYL 10 MG/1
10 SUPPOSITORY RECTAL DAILY PRN
Status: ON HOLD | COMMUNITY
Start: 2024-10-16 | End: 2024-11-22 | Stop reason: ENTERED-IN-ERROR

## 2024-11-21 RX ORDER — ONDANSETRON HYDROCHLORIDE 2 MG/ML
4 INJECTION, SOLUTION INTRAVENOUS EVERY 8 HOURS PRN
Status: DISCONTINUED | OUTPATIENT
Start: 2024-11-21 | End: 2024-11-23 | Stop reason: HOSPADM

## 2024-11-21 RX ORDER — FAMOTIDINE 10 MG/ML
INJECTION INTRAVENOUS
Status: COMPLETED
Start: 2024-11-21 | End: 2024-11-21

## 2024-11-21 RX ORDER — TORSEMIDE 10 MG/1
10 TABLET ORAL DAILY
COMMUNITY

## 2024-11-21 RX ORDER — LIDOCAINE HYDROCHLORIDE 20 MG/ML
1 JELLY TOPICAL ONCE
Status: COMPLETED | OUTPATIENT
Start: 2024-11-21 | End: 2024-11-21

## 2024-11-21 RX ORDER — DOCUSATE SODIUM 100 MG/1
100 CAPSULE, LIQUID FILLED ORAL 2 TIMES DAILY
Status: ON HOLD | COMMUNITY
Start: 2024-10-13 | End: 2024-11-22 | Stop reason: ENTERED-IN-ERROR

## 2024-11-21 RX ORDER — ACETAMINOPHEN 325 MG/1
650 TABLET ORAL EVERY 6 HOURS PRN
Status: DISCONTINUED | OUTPATIENT
Start: 2024-11-21 | End: 2024-11-23 | Stop reason: HOSPADM

## 2024-11-21 SDOH — SOCIAL STABILITY: SOCIAL INSECURITY
WITHIN THE LAST YEAR, HAVE YOU BEEN KICKED, HIT, SLAPPED, OR OTHERWISE PHYSICALLY HURT BY YOUR PARTNER OR EX-PARTNER?: NO

## 2024-11-21 SDOH — SOCIAL STABILITY: SOCIAL INSECURITY: WITHIN THE LAST YEAR, HAVE YOU BEEN HUMILIATED OR EMOTIONALLY ABUSED IN OTHER WAYS BY YOUR PARTNER OR EX-PARTNER?: NO

## 2024-11-21 SDOH — SOCIAL STABILITY: SOCIAL INSECURITY: ARE THERE ANY APPARENT SIGNS OF INJURIES/BEHAVIORS THAT COULD BE RELATED TO ABUSE/NEGLECT?: NO

## 2024-11-21 SDOH — SOCIAL STABILITY: SOCIAL INSECURITY: WITHIN THE LAST YEAR, HAVE YOU BEEN AFRAID OF YOUR PARTNER OR EX-PARTNER?: NO

## 2024-11-21 SDOH — ECONOMIC STABILITY: FOOD INSECURITY: WITHIN THE PAST 12 MONTHS, THE FOOD YOU BOUGHT JUST DIDN'T LAST AND YOU DIDN'T HAVE MONEY TO GET MORE.: NEVER TRUE

## 2024-11-21 SDOH — SOCIAL STABILITY: SOCIAL INSECURITY: ABUSE: ADULT

## 2024-11-21 SDOH — SOCIAL STABILITY: SOCIAL INSECURITY
WITHIN THE LAST YEAR, HAVE YOU BEEN RAPED OR FORCED TO HAVE ANY KIND OF SEXUAL ACTIVITY BY YOUR PARTNER OR EX-PARTNER?: NO

## 2024-11-21 SDOH — ECONOMIC STABILITY: FOOD INSECURITY: WITHIN THE PAST 12 MONTHS, YOU WORRIED THAT YOUR FOOD WOULD RUN OUT BEFORE YOU GOT THE MONEY TO BUY MORE.: NEVER TRUE

## 2024-11-21 SDOH — SOCIAL STABILITY: SOCIAL INSECURITY: WERE YOU ABLE TO COMPLETE ALL THE BEHAVIORAL HEALTH SCREENINGS?: YES

## 2024-11-21 SDOH — ECONOMIC STABILITY: INCOME INSECURITY: IN THE PAST 12 MONTHS HAS THE ELECTRIC, GAS, OIL, OR WATER COMPANY THREATENED TO SHUT OFF SERVICES IN YOUR HOME?: NO

## 2024-11-21 SDOH — SOCIAL STABILITY: SOCIAL INSECURITY: DO YOU FEEL ANYONE HAS EXPLOITED OR TAKEN ADVANTAGE OF YOU FINANCIALLY OR OF YOUR PERSONAL PROPERTY?: NO

## 2024-11-21 SDOH — SOCIAL STABILITY: SOCIAL INSECURITY: ARE YOU OR HAVE YOU BEEN THREATENED OR ABUSED PHYSICALLY, EMOTIONALLY, OR SEXUALLY BY ANYONE?: NO

## 2024-11-21 SDOH — SOCIAL STABILITY: SOCIAL INSECURITY: HAVE YOU HAD THOUGHTS OF HARMING ANYONE ELSE?: NO

## 2024-11-21 SDOH — SOCIAL STABILITY: SOCIAL INSECURITY: HAVE YOU HAD ANY THOUGHTS OF HARMING ANYONE ELSE?: NO

## 2024-11-21 SDOH — SOCIAL STABILITY: SOCIAL INSECURITY: HAS ANYONE EVER THREATENED TO HURT YOUR FAMILY OR YOUR PETS?: NO

## 2024-11-21 SDOH — SOCIAL STABILITY: SOCIAL INSECURITY: DO YOU FEEL UNSAFE GOING BACK TO THE PLACE WHERE YOU ARE LIVING?: NO

## 2024-11-21 SDOH — SOCIAL STABILITY: SOCIAL INSECURITY: DOES ANYONE TRY TO KEEP YOU FROM HAVING/CONTACTING OTHER FRIENDS OR DOING THINGS OUTSIDE YOUR HOME?: NO

## 2024-11-21 ASSESSMENT — COGNITIVE AND FUNCTIONAL STATUS - GENERAL
STANDING UP FROM CHAIR USING ARMS: A LITTLE
EATING MEALS: A LITTLE
DRESSING REGULAR LOWER BODY CLOTHING: A LITTLE
MOVING FROM LYING ON BACK TO SITTING ON SIDE OF FLAT BED WITH BEDRAILS: A LITTLE
TOILETING: A LITTLE
PATIENT BASELINE BEDBOUND: NO
PERSONAL GROOMING: A LITTLE
DAILY ACTIVITIY SCORE: 18
TURNING FROM BACK TO SIDE WHILE IN FLAT BAD: A LITTLE
DRESSING REGULAR UPPER BODY CLOTHING: A LITTLE
MOVING TO AND FROM BED TO CHAIR: A LITTLE
CLIMB 3 TO 5 STEPS WITH RAILING: A LITTLE
HELP NEEDED FOR BATHING: A LITTLE
WALKING IN HOSPITAL ROOM: A LITTLE
MOBILITY SCORE: 18

## 2024-11-21 ASSESSMENT — ACTIVITIES OF DAILY LIVING (ADL)
ASSISTIVE_DEVICE: WALKER
DRESSING YOURSELF: INDEPENDENT
TOILETING: NEEDS ASSISTANCE
ADEQUATE_TO_COMPLETE_ADL: YES
HEARING - RIGHT EAR: FUNCTIONAL
GROOMING: INDEPENDENT
BATHING: INDEPENDENT
FEEDING YOURSELF: INDEPENDENT
PATIENT'S MEMORY ADEQUATE TO SAFELY COMPLETE DAILY ACTIVITIES?: NO
JUDGMENT_ADEQUATE_SAFELY_COMPLETE_DAILY_ACTIVITIES: YES
WALKS IN HOME: NEEDS ASSISTANCE
LACK_OF_TRANSPORTATION: NO
HEARING - LEFT EAR: FUNCTIONAL

## 2024-11-21 ASSESSMENT — PAIN SCALES - GENERAL
PAINLEVEL_OUTOF10: 0 - NO PAIN

## 2024-11-21 ASSESSMENT — LIFESTYLE VARIABLES
SKIP TO QUESTIONS 9-10: 1
AUDIT-C TOTAL SCORE: 0
HOW OFTEN DO YOU HAVE 6 OR MORE DRINKS ON ONE OCCASION: NEVER
HOW OFTEN DO YOU HAVE A DRINK CONTAINING ALCOHOL: NEVER
HOW MANY STANDARD DRINKS CONTAINING ALCOHOL DO YOU HAVE ON A TYPICAL DAY: PATIENT DOES NOT DRINK
AUDIT-C TOTAL SCORE: 0

## 2024-11-21 ASSESSMENT — PATIENT HEALTH QUESTIONNAIRE - PHQ9
SUM OF ALL RESPONSES TO PHQ9 QUESTIONS 1 & 2: 0
2. FEELING DOWN, DEPRESSED OR HOPELESS: NOT AT ALL
1. LITTLE INTEREST OR PLEASURE IN DOING THINGS: NOT AT ALL

## 2024-11-21 ASSESSMENT — PAIN - FUNCTIONAL ASSESSMENT: PAIN_FUNCTIONAL_ASSESSMENT: 0-10

## 2024-11-21 ASSESSMENT — PAIN SCALES - WONG BAKER: WONGBAKER_NUMERICALRESPONSE: NO HURT

## 2024-11-21 NOTE — ED PROVIDER NOTES
HPI     CC: Allergic Reaction     HPI: Michell Medrano is a 89 y.o. female with a history of RA, venous insufficiency, s/p laparoscopic cholecystectomy 10/11, C/V atrial flutter started on flecainide and Eliquis, who presents with concern for allergic reaction.  Patient is a very tangential historian.  She states that since starting flecainide and Eliquis she has been having intermittent throat closing sensation.  They switched her to Xarelto a week and a half ago but her symptoms did not improve.  She has associated shakiness with these episodes, denies GI complaints, itching or rash.  She presented to her primary care physician, Dr. Calderon, earlier today and received epi in the office.  She was apparently in respiratory distress per EMS.  No report of any history of desaturation.  She does continue to complain of substernal chest discomfort, shortness of breath, but her throat tightening sensation is significantly improved.  She has some associated anterior neck  pain.  Since her surgery, she has had severe constant dizziness.  She states she is on iron, has dark stools at baseline but denies any hematochezia or melena.    ROS: 10-point review of systems was performed and is otherwise negative except as noted in HPI.    Limitations to history: N/A    Independent Historians: Daughter at bedside    External Records Reviewed: Prior outside hospital notes from [facility]    Past Medical History: Noncontributory except per HPI     Past Surgical History: Noncontributory except per HPI     Family History: Reviewed and noncontributory     Social History:  Denies tobacco. Denies ETOH. Denies illicit drugs.    Social Determinants Affecting Care: N/A    Allergies   Allergen Reactions    Apixaban Anaphylaxis    Doxycycline Unknown    Erythromycin Unknown    Erythromycin Base Unknown    Methen-M.Blue-S.Phos-Phsal-Hyo Unknown     Uribel CAPS    Naproxen Unknown    Penicillins Unknown    Prednisone Unknown    Pseudoephedrine Unknown     "Tetracycline Unknown    Cephalosporins Hives    Red Dye Rash       Home Meds:   Current Outpatient Medications   Medication Instructions    acetaminophen (TYLENOL) 650 mg, Every 6 hours PRN    allopurinol (ZYLOPRIM) 200 mg, oral, Daily    benzonatate (TESSALON) 200 mg, oral, 3 times daily PRN, Do not crush or chew.    bisacodyl (DULCOLAX) 10 mg, Daily PRN    calcium carbonate-vitamin D3 500 mg-5 mcg (200 unit) tablet 1 tablet, oral, Daily    dilTIAZem (Cardizem) 120 mg immediate release tablet 1 tablet, Daily (0630)    docusate sodium (COLACE) 100 mg, 2 times daily    ergocalciferol (VITAMIN D-2) 1,250 mcg, oral, Every 30 days, Taking 2 times a month    ferrous sulfate 65 mg, oral, Once Weekly, Do not crush, chew, or split.    flecainide (TAMBOCOR) 50 mg, oral, 2 times daily    folic acid (FOLVITE) 1 mg, Daily RT    furosemide (Lasix) 20 mg tablet 1 tablet, Daily (0630)    gabapentin (NEURONTIN) 300 mg, oral, Nightly    lutein 6 mg tablet 1 tablet, oral, Daily    melatonin 3 mg tablet 2 tablets, Nightly    methotrexate (TREXALL) 20 mg, oral, Once Weekly, Follow directions carefully, and ask to explain any part you do not understand. Take exactly as directed.    omega-3 (Fish OiL) 300-1,000 mg capsule 1,000 mg, oral, Daily    pantoprazole (PROTONIX) 20 mg, Daily RT    potassium chloride CR 20 mEq ER tablet 20 mEq, oral, Daily        Physical Exam     ED Triage Vitals   Temperature Heart Rate Respirations BP   11/21/24 1342 11/21/24 1342 11/21/24 1342 11/21/24 1342   36.7 °C (98 °F) 99 20 146/64      Pulse Ox Temp Source Heart Rate Source Patient Position   11/21/24 1342 11/21/24 1342 -- 11/21/24 1649   97 % Oral  Lying      BP Location FiO2 (%)     -- --               Heart Rate:  []   Temperature:  [36.7 °C (98 °F)]   Respirations:  [14-23]   BP: (112-152)/(53-93)   Height:  [162.6 cm (5' 4\")]   Weight:  [67.1 kg (148 lb)]   Pulse Ox:  [97 %-100 %]      Physical Exam  Vitals and nursing note reviewed. "     CONSTITUTIONAL: Well appearing, well nourished, in no acute distress.   HENMT: Head atraumatic. Airway patent. Nasal mucosa clear. Mouth with very dry mucosa, clear oropharynx. Uvula midline. Neck supple.    EYES: Clear bilaterally, pupils equally round and reactive to light.   CARDIOVASCULAR: Normal rate, regular rhythm.  Heart sounds S1, S2.  No murmurs, rubs or gallops. Normal pulses. Capillary refill < 2 sec.   RESPIRATORY: No increased work of breathing. Breath sounds clear and equal bilaterally.  GASTROINTESTINAL: Abdomen soft, non-distended, non-tender. No rebound, no guarding. Normal bowel sounds. No palpable masses.  EDWIN Brown stool.  GENITOURINARY:  No CVA tenderness.  MUSCULOSKELETAL: Spine appears normal, range of motion is not limited, no muscle or joint tenderness. No edema.   NEUROLOGICAL: Alert and oriented, no asymmetry, moving all extremities equally.  SKIN: Warm, dry and intact. No rash or notable lesions.  PSYCHIATRIC: Normal mood and affect.  HEME/LYMPH: No adenopathy or splenomegaly.    Diagnostic Results      ECG: ECGs read and interpreted by me. See ED Course, below, for interpretation.    Labs Reviewed   CBC WITH AUTO DIFFERENTIAL - Abnormal       Result Value    WBC 5.0      nRBC 0.0      RBC 2.01 (*)     Hemoglobin 6.9 (*)     Hematocrit 21.6 (*)      (*)     MCH 34.3 (*)     MCHC 31.9 (*)     RDW 16.4 (*)     Platelets 224      Neutrophils % 46.3      Immature Granulocytes %, Automated 0.4      Lymphocytes % 42.5      Monocytes % 7.8      Eosinophils % 2.6      Basophils % 0.4      Neutrophils Absolute 2.30      Immature Granulocytes Absolute, Automated 0.02      Lymphocytes Absolute 2.11      Monocytes Absolute 0.39      Eosinophils Absolute 0.13      Basophils Absolute 0.02     COMPREHENSIVE METABOLIC PANEL - Abnormal    Glucose 133 (*)     Sodium 133 (*)     Potassium 3.6      Chloride 102      Bicarbonate 22      Anion Gap 13      Urea Nitrogen 22      Creatinine 0.92       eGFR 60 (*)     Calcium 7.9 (*)     Albumin 3.2 (*)     Alkaline Phosphatase 106      Total Protein 5.8 (*)     AST 24      Bilirubin, Total 0.7      ALT 15     TROPONIN I, HIGH SENSITIVITY - Abnormal    Troponin I, High Sensitivity 16 (*)     Narrative:     Less than 99th percentile of normal range cutoff-  Female and children under 18 years old <14 ng/L; Male <21 ng/L: Negative  Repeat testing should be performed if clinically indicated.     Female and children under 18 years old 14-50 ng/L; Male 21-50 ng/L:  Consistent with possible cardiac damage and possible increased clinical   risk. Serial measurements may help to assess extent of myocardial damage.     >50 ng/L: Consistent with cardiac damage, increased clinical risk and  myocardial infarction. Serial measurements may help assess extent of   myocardial damage.      NOTE: Children less than 1 year old may have higher baseline troponin   levels and results should be interpreted in conjunction with the overall   clinical context.     NOTE: Troponin I testing is performed using a different   testing methodology at [facility] than at other   [facility]. Direct result comparisons should only   be made within the same method.   TYPE AND SCREEN    ABO TYPE A      Rh TYPE POS      ANTIBODY SCREEN NEG     VERAB/VERIFY ABORH    ABO TYPE A      Rh TYPE POS     PREPARE RBC    PRODUCT CODE [id number]      Unit Number [id number]      Unit ABO A      Unit RH POS      XM INTEP COMP      Dispense Status XM      Blood Expiration Date 12/10/2024 11:59:00 PM EST      PRODUCT BLOOD TYPE 6200      UNIT VOLUME 350           CT soft tissue neck w IV contrast   Final Result   No evidence of cervical adenopathy or a soft tissue mass in the neck.        No acute findings.        Mild-to-moderate multilevel degenerative cervical disc disease.   Moderate to advanced multilevel degenerative cervical facet disease.   Partial osseous fusion C5-C6.        MACRO:   None         Signed by: Dustin Narayan 11/21/2024 4:52 PM   Dictation workstation:   [id number]      XR chest 1 view   Final Result   No acute cardiopulmonary process.        MACRO:   None        Signed by: Angie Self 11/21/2024 3:38 PM   Dictation workstation:   [id number]                    [place] Coma Scale Score: 15                  Procedure  Procedures    ED Course & MDM   Assessment/Plan:   Michell Medrano is a 89 y.o. female with a history of RA, venous insufficiency, s/p laparoscopic cholecystectomy 10/11, C/V atrial flutter started on flecainide and Eliquis, who presents with concern for allergic reaction.  She describes a throat tightening sensation ever since starting flecainide and Eliquis last month which acutely worsened today.  She received epinephrine and route with significant improvement in her symptoms.  I also gave her Solu-Medrol, Benadryl, and Pepcid IV with ongoing resolution of symptoms.  She has also been complaining of some chronic shortness of breath and dizziness.  She is hemodynamically stable and well-appearing albeit with very dry mucous membranes.  She is saturating well on room air.  Her airway is intact.  Will perform nasopharyngoscopy to assess supraglottic region.  Will obtain CT neck given chronicity of symptom and associated neck pain s to rule out a mass.  Given her shortness of breath, chest tightness, and chronic dizziness we will also obtain ECG and labs. See below for details of ED course and ultimate disposition.    Medications   famotidine PF (Pepcid) injection 20 mg (20 mg intravenous Given 11/21/24 1430)   methylPREDNISolone sod succinate (SOLU-Medrol) injection 125 mg (125 mg intravenous Given 11/21/24 1430)   diphenhydrAMINE (BENADryl) injection 50 mg (50 mg intravenous Given 11/21/24 1430)   lidocaine 2 % mucosal jelly (Uro-Jet) 1 Application (1 Application Topical Given 11/21/24 1541)   iohexol (OMNIPaque) 350 mg iodine/mL solution 75 mL (75 mL intravenous Given  11/21/24 1630)        ED Course as of 11/21/24 1837   u Nov 21, 2024   1645 ECG read interpreted by me.  Normal sinus rhythm, rate 88.  Normal axis.  Normal intervals.  Low voltage QRS.  No ST or T wave derangements. [CG]   1645 Chest x-ray unremarkable. [CG]   1645 A-scope with no signs of supraglottic edema. [CG]   1646 Labs are notable for CBC without leukocytosis, worsening anemia 6.9 (macrocytic), normal platelets, CMP with no significant pathology, mildly elevated troponin 16 [CG]   1730 CT neck no acute findings [CG]   1730 Patient consented for blood transfusion. EDWIN brown stool.  [CG]   1837 Patient was accepted to the observation unit for further management. [CG]      ED Course User Index  [CG] Luann Lees MD         Diagnoses as of 11/21/24 1837   Symptomatic anemia   Neck discomfort   Dizziness   Shortness of breath   Allergic reaction, initial encounter       Disposition:   Admitted to OBS, discussed differential and findings with patient as well as any family members at bedside.      ED Prescriptions    None         Luann Lees MD  EM/IM/Peds    This note was dictated by speech recognition. Minor errors in transcription may be present.     Luann Lees MD  11/21/24 1837

## 2024-11-21 NOTE — ED TRIAGE NOTES
Pt to ED via EMS from Dr office for allergic reaction. Pt received epipen in office by provider. Pt states she feels like she is able to swallow again and her breathing has improved. Pt stated she had her gallbladder removed in October and developed aflutter which she was put on Flecainide and Eliquis. Pt developed a tightness in her throat and stopped taking the Eliquis per doctor, then started taking Xarelto. Pt stopped taking Xarelto x2 days ago after developing a tight throat, pt followed up with doctor today and was given an epipen in the office. Pt also endorses SOB since her surgery in October.

## 2024-11-21 NOTE — H&P
[facility] OBSERVATION H&P    HPI: Michell Medrano is a 89 y.o. female, with a PMH of choledocholithiasis and chronic cholecystitis s/p recent laparoscopic cholecystectomy 10/11/24 c/b atrial flutter, RA, pancreatic cyst, HTN, chronic diastolic HF, KAROLINA, PFO per TTE 10/2024, hypothyroidism, GERD, chronic venous insufficiency, and remote breast CA, who presented to [facility] ED on 11/21/2024 for possible allergic rxn. Patient reports intermittent tightness in her throat/throat closing sensation and SOB since October when she was started on new medications for atrial flutter. Her PCP stopped Eliquis a week ago and placed her on Xarelto but throat tightness continued. She was seen in office today and experienced throat tightness, SOB, shakiness, and fatigue after walking into the office. She was administered Epinephrine via EpiPen with improvement in symptoms. She was then taken to the ED by EMS. EMS report does note respiratory distress but she arrived off oxygen with stable VS. In addition, HPI is limited as patient is a poor historian, tangential in her story. Her daughter is at bedside and provides some history. Patient does note shakiness with these episodes but denies any other associated symptoms. She also notes constant dizziness since her surgery, does not tolerate Meclizine. She was additionally taken off Diltiazem since admission d/t reported symptomatic low BP at home, which patient states has improved. Patient also notes increased edema recently and concern for fluid retention for which she was prescribed Lasix 20mg x5 days on top of her daily Torsemide 10mg. She did not feel the Lasix had much effect. Her PCP had also recently instructed her to decrease the Flecainide to daily to see if her symptoms improved, which she is unable to tell me if that helped or not.    ED course notable for stable VS. Labs with worsening anemia, Hgb 6.9 from 8.0 on 11/7 and 11.6 in September, mild hyponatremia, and slight troponin  elevation. CT neck negative for soft tissue mass and scope revealed no supraepiglottic edema.  -Patient received Solu-Medrol, Benadryl, and Pepcid IV     Patient History   PMH: She has a past medical history of Personal history of other diseases of the musculoskeletal system and connective tissue (06/06/2016) and Polyosteoarthritis, unspecified (02/02/2018).  PSH: She has a past surgical history that includes Total hip arthroplasty (01/15/2014); Other surgical history (12/05/2022); Other surgical history (12/05/2022); and Total abdominal hysterectomy w/ bilateral salpingoophorectomy (12/10/2014).  SH: She reports that she has never smoked. She has never used smokeless tobacco. She reports that she does not currently use alcohol. She reports that she does not use drugs.  FH: family history is not on file.     Allergies   Allergen Reactions    Apixaban Anaphylaxis    Doxycycline Unknown    Erythromycin Unknown    Erythromycin Base Unknown    Methen-M.Blue-S.Phos-Phsal-Hyo Unknown     Uribel CAPS    Naproxen Unknown    Penicillins Unknown    Prednisone Unknown    Pseudoephedrine Unknown    Tetracycline Unknown    Cephalosporins Hives    Red Dye Rash     Current Outpatient Medications   Medication Instructions    acetaminophen (TYLENOL) 650 mg, Every 6 hours PRN    allopurinol (ZYLOPRIM) 200 mg, oral, Daily    benzonatate (TESSALON) 200 mg, oral, 3 times daily PRN, Do not crush or chew.    bisacodyl (DULCOLAX) 10 mg, Daily PRN    calcium carbonate-vitamin D3 500 mg-5 mcg (200 unit) tablet 1 tablet, oral, Daily    dilTIAZem (Cardizem) 120 mg immediate release tablet 1 tablet, Daily (0630)    docusate sodium (COLACE) 100 mg, 2 times daily    ergocalciferol (VITAMIN D-2) 1,250 mcg, oral, Every 30 days, Taking 2 times a month    ferrous sulfate 65 mg, oral, Once Weekly, Do not crush, chew, or split.    flecainide (TAMBOCOR) 50 mg, oral, 2 times daily    folic acid (FOLVITE) 1 mg, Daily RT    furosemide (Lasix) 20 mg tablet  "1 tablet, Daily (0630)    gabapentin (NEURONTIN) 300 mg, oral, Nightly    lutein 6 mg tablet 1 tablet, oral, Daily    melatonin 3 mg tablet 2 tablets, Nightly    methotrexate (TREXALL) 20 mg, oral, Once Weekly, Follow directions carefully, and ask to explain any part you do not understand. Take exactly as directed.    omega-3 (Fish OiL) 300-1,000 mg capsule 1,000 mg, oral, Daily    pantoprazole (PROTONIX) 20 mg, Daily RT    potassium chloride CR 20 mEq ER tablet 20 mEq, oral, Daily     Review of Systems   ROS: 10-point review of systems was performed and is otherwise negative except as noted in HPI.        Heart Rate:  []   Temperature:  [36.7 °C (98 °F)]   Respirations:  [14-23]   BP: (112-152)/(53-93)   Height:  [162.6 cm (5' 4\")]   Weight:  [67.1 kg (148 lb)]   Pulse Ox:  [97 %-100 %]      0-10 (Numeric) Pain Score: 0 - No pain   Vitals:    11/21/24 1342   Weight: 67.1 kg (148 lb)        Physical Exam:  Vitals and nursing notes reviewed.  GENERAL: Alert and awake, cooperative; in no acute distress  SKIN: Warm and dry, cap refill <2  HEENT: Normocephalic, PEERL, mucous membranes pink and moist  CARDIAC: Regular rate and rhythm, S1S2, no murmurs or abnormal heart sounds  CHEST: Normal respiratory effort, no abnormal breath sounds  ABDOMEN: soft, non-distended, non-tender with palpation  EXTREMITIES: No lower extremity edema, normal pulses all 4 extremities  NEURO: Alert and oriented, mental status at baseline, no focal deficits  PSYCH: Behavior and affect as expected     Medications  allopurinol, 200 mg, oral, Daily  dilTIAZem CD, 120 mg, oral, Daily  flecainide, 50 mg, oral, BID  gabapentin, 300 mg, oral, Nightly  [START ON 11/22/2024] pantoprazole, 40 mg, intravenous, BID   Followed by  [START ON 11/25/2024] pantoprazole, 40 mg, oral, BID AC  pantoprazole, 80 mg, intravenous, Once     PRN medications: acetaminophen, melatonin, ondansetron, polyethylene glycol    Diagnostic Results   CBC- 11/21/2024:  2:57 " PM  5.0 6.9 224    21.6      BMP- 11/21/2024:  2:57 PM  133 22 _ 133   3.6 0.92 22    Estimated Creatinine Clearance: 39.8 mL/min (by C-G formula based on SCr of 0.92 mg/dL).     CA: 7.9 PROTIEN: 5.8 ALT: 15 Total Bili: 0.7 Mg: _   PHOS: _ ALBUMIN: 3.2 AST: 24   Alk Phos: 106      COAGS- No results in last year.  _   _ _     CV Labs  Troponin I, High Sensitivity   Date/Time Value Ref Range Status   11/21/2024 02:57 PM 16 (H) 0 - 13 ng/L Final     Hemoglobin A1C   Date/Time Value Ref Range Status   03/14/2019 01:06 PM 5.4 % Final     Comment:          Diagnosis of Diabetes-Adults   Non-Diabetic: < or = 5.6%   Increased risk for developing diabetes: 5.7-6.4%   Diagnostic of diabetes: > or = 6.5%  .       Monitoring of Diabetes                Age (y)     Therapeutic Goal (%)   Adults:          >18           <7.0   Pediatrics:    13-18           <7.5                   7-12           <8.0                   0- 6            7.5-8.5   American Diabetes Association. Diabetes Care 33(S1), Jan 2010.       LDL Calculated   Date/Time Value Ref Range Status   03/25/2024 10:16  (H) <=99 mg/dL Final     Comment:                                 Near   Borderline      AGE      Desirable  Optimal    High     High     Very High     0-19 Y     0 - 109     ---    110-129   >/= 130     ----    20-24 Y     0 - 119     ---    120-159   >/= 160     ----      >24 Y     0 -  99   100-129  130-159   160-189     >/=190         Pertinent Imaging  XR chest 1 view 11/21/2024  No acute cardiopulmonary process.     CT abdomen pelvis w IV contrast 10/06/2024         Assessment/Plan   Ms. Michell Medrano is a 89 y.o. female who p/w allergic rxn and is admitted for acute anemia. PMH includes choledocholithiasis and chronic cholecystitis s/p recent laparoscopic cholecystectomy 10/11/24 c/b atrial flutter, RA, pancreatic cyst, HTN, chronic diastolic HF, KAROLINA, PFO per TTE 10/2024, hypothyroidism, GERD, and remote breast CA.    #Acute Anemia  -Has been  on Eliquis/Xarelto, stopped d/t concern for allergic rxn  -Transfuse for Hgb <7, s/p 1 unit PRBC in ED  -Recent iron studies with iron 41, iron sat low at 7%, ferritin high on 11/7, and TIBC   -c/w PO Iron daily  -Send retic count, B12, folate  -Check stool for occult blood, consider GI consult if positive  -IV PPI ordered  -NPO at 0000 in case GI consult is needed    #Possible Allergic Rxn  -Patient c/o intermittent throat tightness since October  -Improved s/p EpiPen as outpatient  -Received Solumedrol, Pepcid, and Benadryl in ED  -Eliquis stopped and transitioned to Xarelto without improvement in sx  -?allergy to DOACs, discuss with pharmacy in AM  -Also recently started on Flecainide, Cardizem has since been discontinued    #pAF/flutter  -Currently NSR per ECG  -c/w Flecainide for rate/rhythm control  -Hold anticoagulation d/t concern for active bleeding  -May need to consider Coumadin if unable to tolerate DOACs  -[id number] score for Atrial Fibrillation Stroke Risk is 4, moderate-high for thromboembolic event  -Has FU with cardiology in December to establish care    #Chronic Diastolic HF  #Chronic Venous Insufficiency   -Euvolemic on exam  -Hold home Torsemide in the setting of acute anemia    #Hx of Gout  -No s/s of flare  -Ppx with Allopurinol     #GERD  -PPI as above, resume PO at discharge      GI/VTE PPX: PPI, no pharmacological ppx d/t concern for active bleeding  Code Status: No Order    Chart, medical history, and labs/testing reviewed in detail.   Case and plan of care to be discussed with attending provider.      Disposition: Discharge home once medically cleared and stable, pending anemia evaluation    FLOWER Maier-CNP   Observation/Internal Med [facility]  [facility]  11/21/24  6:29 PM  Total time of 45 minutes spent on professional and overall care, with >50% of time dedicated to counseling/coordination of care.

## 2024-11-21 NOTE — ED PROCEDURE NOTE
Procedure  Procedures    Anesthesia: [4% lidocaine atomized] [topical viscous lidocaine] [Afrin]     Indications: allergic reaction    Procedure: After verbal consent was obtained, the patient was placed in upright position.  After topical anesthetic application, a fiberoptic nasal laryngoscope was inserted into the patient's right naris and advanced to the level of the epiglottis.  Nasal mucous membranes pink and moist, no evidence of mass ulceration or lesion.  Scope advanced to the level of the oropharynx where the base of the tongue, vallecula and epiglottis were visualized.  The epiglottis was crisp without significant edema or erythema.  The arytenoid cartilage was visualized without significant swelling.  The patient had mobile nonedematous vocal cords.  Nasopharyngeal scope removed without difficulty.  Patient tolerated procedure well without any immediate complications.               Sirisha Echols MD  Resident  11/21/24 6740

## 2024-11-21 NOTE — LETTER
December 3, 2024    Patient: Michell Medrano   YOB: 1935   Date of Visit: 11/21/2024       To Whom It May Concern:    Michell Medrano was seen and treated in our emergency department on 11/21/2024. She was accompanied by her daughter Zeina Paz.    If you have any questions or concerns, please don't hesitate to call.              CC: No Recipients

## 2024-11-22 ENCOUNTER — APPOINTMENT (OUTPATIENT)
Dept: CARDIOLOGY | Facility: HOSPITAL | Age: 89
End: 2024-11-22
Payer: MEDICARE

## 2024-11-22 LAB
ANION GAP SERPL CALC-SCNC: 11 MMOL/L (ref 10–20)
BASOPHILS # BLD AUTO: 0 X10*3/UL (ref 0–0.1)
BASOPHILS NFR BLD AUTO: 0 %
BODY SURFACE AREA: 1.74 M2
BUN SERPL-MCNC: 21 MG/DL (ref 6–23)
CALCIUM SERPL-MCNC: 8 MG/DL (ref 8.6–10.3)
CARDIAC TROPONIN I PNL SERPL HS: 23 NG/L (ref 0–13)
CHLORIDE SERPL-SCNC: 105 MMOL/L (ref 98–107)
CO2 SERPL-SCNC: 23 MMOL/L (ref 21–32)
CREAT SERPL-MCNC: 0.87 MG/DL (ref 0.5–1.05)
EGFRCR SERPLBLD CKD-EPI 2021: 64 ML/MIN/1.73M*2
EOSINOPHIL # BLD AUTO: 0 X10*3/UL (ref 0–0.4)
EOSINOPHIL NFR BLD AUTO: 0 %
ERYTHROCYTE [DISTWIDTH] IN BLOOD BY AUTOMATED COUNT: 19.6 % (ref 11.5–14.5)
ERYTHROCYTE [DISTWIDTH] IN BLOOD BY AUTOMATED COUNT: 19.9 % (ref 11.5–14.5)
ERYTHROCYTE [DISTWIDTH] IN BLOOD BY AUTOMATED COUNT: 20 % (ref 11.5–14.5)
FOLATE SERPL-MCNC: 12 NG/ML
GLUCOSE SERPL-MCNC: 152 MG/DL (ref 74–99)
HCT VFR BLD AUTO: 23.8 % (ref 36–46)
HCT VFR BLD AUTO: 24 % (ref 36–46)
HCT VFR BLD AUTO: 26.7 % (ref 36–46)
HGB BLD-MCNC: 7.7 G/DL (ref 12–16)
HGB BLD-MCNC: 7.8 G/DL (ref 12–16)
HGB BLD-MCNC: 8.6 G/DL (ref 12–16)
HOLD SPECIMEN: NORMAL
HOLD SPECIMEN: NORMAL
IMM GRANULOCYTES # BLD AUTO: 0.02 X10*3/UL (ref 0–0.5)
IMM GRANULOCYTES NFR BLD AUTO: 0.6 % (ref 0–0.9)
LYMPHOCYTES # BLD AUTO: 0.56 X10*3/UL (ref 0.8–3)
LYMPHOCYTES NFR BLD AUTO: 16.3 %
MCH RBC QN AUTO: 33.1 PG (ref 26–34)
MCH RBC QN AUTO: 33.1 PG (ref 26–34)
MCH RBC QN AUTO: 33.2 PG (ref 26–34)
MCHC RBC AUTO-ENTMCNC: 32.1 G/DL (ref 32–36)
MCHC RBC AUTO-ENTMCNC: 32.2 G/DL (ref 32–36)
MCHC RBC AUTO-ENTMCNC: 32.8 G/DL (ref 32–36)
MCV RBC AUTO: 101 FL (ref 80–100)
MCV RBC AUTO: 103 FL (ref 80–100)
MCV RBC AUTO: 103 FL (ref 80–100)
MONOCYTES # BLD AUTO: 0.05 X10*3/UL (ref 0.05–0.8)
MONOCYTES NFR BLD AUTO: 1.5 %
NEUTROPHILS # BLD AUTO: 2.8 X10*3/UL (ref 1.6–5.5)
NEUTROPHILS NFR BLD AUTO: 81.6 %
NRBC BLD-RTO: 0 /100 WBCS (ref 0–0)
PLATELET # BLD AUTO: 198 X10*3/UL (ref 150–450)
PLATELET # BLD AUTO: 220 X10*3/UL (ref 150–450)
PLATELET # BLD AUTO: 258 X10*3/UL (ref 150–450)
POTASSIUM SERPL-SCNC: 3.4 MMOL/L (ref 3.5–5.3)
RBC # BLD AUTO: 2.32 X10*6/UL (ref 4–5.2)
RBC # BLD AUTO: 2.36 X10*6/UL (ref 4–5.2)
RBC # BLD AUTO: 2.6 X10*6/UL (ref 4–5.2)
SODIUM SERPL-SCNC: 136 MMOL/L (ref 136–145)
VIT B12 SERPL-MCNC: 621 PG/ML (ref 211–911)
WBC # BLD AUTO: 3 X10*3/UL (ref 4.4–11.3)
WBC # BLD AUTO: 3.4 X10*3/UL (ref 4.4–11.3)
WBC # BLD AUTO: 8.1 X10*3/UL (ref 4.4–11.3)

## 2024-11-22 PROCEDURE — 85027 COMPLETE CBC AUTOMATED: CPT

## 2024-11-22 PROCEDURE — 2500000001 HC RX 250 WO HCPCS SELF ADMINISTERED DRUGS (ALT 637 FOR MEDICARE OP): Performed by: NURSE PRACTITIONER

## 2024-11-22 PROCEDURE — G0378 HOSPITAL OBSERVATION PER HR: HCPCS

## 2024-11-22 PROCEDURE — 99223 1ST HOSP IP/OBS HIGH 75: CPT | Performed by: INTERNAL MEDICINE

## 2024-11-22 PROCEDURE — 99222 1ST HOSP IP/OBS MODERATE 55: CPT | Performed by: INTERNAL MEDICINE

## 2024-11-22 PROCEDURE — 85027 COMPLETE CBC AUTOMATED: CPT | Mod: 59 | Performed by: NURSE PRACTITIONER

## 2024-11-22 PROCEDURE — 80048 BASIC METABOLIC PNL TOTAL CA: CPT | Performed by: NURSE PRACTITIONER

## 2024-11-22 PROCEDURE — 84484 ASSAY OF TROPONIN QUANT: CPT

## 2024-11-22 PROCEDURE — 2500000004 HC RX 250 GENERAL PHARMACY W/ HCPCS (ALT 636 FOR OP/ED): Performed by: NURSE PRACTITIONER

## 2024-11-22 PROCEDURE — 36415 COLL VENOUS BLD VENIPUNCTURE: CPT | Performed by: NURSE PRACTITIONER

## 2024-11-22 PROCEDURE — 83088 ASSAY OF HISTAMINE: CPT

## 2024-11-22 PROCEDURE — 85025 COMPLETE CBC W/AUTO DIFF WBC: CPT

## 2024-11-22 PROCEDURE — 93270 REMOTE 30 DAY ECG REV/REPORT: CPT

## 2024-11-22 PROCEDURE — 96376 TX/PRO/DX INJ SAME DRUG ADON: CPT

## 2024-11-22 PROCEDURE — 99238 HOSP IP/OBS DSCHRG MGMT 30/<: CPT | Performed by: NURSE PRACTITIONER

## 2024-11-22 PROCEDURE — 99232 SBSQ HOSP IP/OBS MODERATE 35: CPT

## 2024-11-22 PROCEDURE — 36415 COLL VENOUS BLD VENIPUNCTURE: CPT

## 2024-11-22 PROCEDURE — 2500000002 HC RX 250 W HCPCS SELF ADMINISTERED DRUGS (ALT 637 FOR MEDICARE OP, ALT 636 FOR OP/ED): Mod: MUE

## 2024-11-22 RX ORDER — ERGOCALCIFEROL 1.25 MG/1
1.25 CAPSULE ORAL
Status: CANCELLED
Start: 2024-11-22

## 2024-11-22 RX ORDER — LEVOTHYROXINE SODIUM 50 UG/1
50 TABLET ORAL DAILY
COMMUNITY

## 2024-11-22 RX ORDER — PANTOPRAZOLE SODIUM 20 MG/1
20 TABLET, DELAYED RELEASE ORAL
Status: DISCONTINUED | OUTPATIENT
Start: 2024-11-23 | End: 2024-11-23 | Stop reason: HOSPADM

## 2024-11-22 RX ORDER — POTASSIUM CHLORIDE 20 MEQ/1
40 TABLET, EXTENDED RELEASE ORAL ONCE
Status: COMPLETED | OUTPATIENT
Start: 2024-11-22 | End: 2024-11-22

## 2024-11-22 RX ORDER — POLYETHYLENE GLYCOL 3350 17 G/17G
17 POWDER, FOR SOLUTION ORAL DAILY
COMMUNITY

## 2024-11-22 RX ORDER — LEVOTHYROXINE SODIUM 50 UG/1
50 TABLET ORAL DAILY
Status: DISCONTINUED | OUTPATIENT
Start: 2024-11-23 | End: 2024-11-23 | Stop reason: HOSPADM

## 2024-11-22 ASSESSMENT — COGNITIVE AND FUNCTIONAL STATUS - GENERAL
TURNING FROM BACK TO SIDE WHILE IN FLAT BAD: A LITTLE
TOILETING: A LITTLE
DRESSING REGULAR LOWER BODY CLOTHING: A LITTLE
DAILY ACTIVITIY SCORE: 18
HELP NEEDED FOR BATHING: A LITTLE
STANDING UP FROM CHAIR USING ARMS: A LITTLE
MOBILITY SCORE: 18
EATING MEALS: A LITTLE
CLIMB 3 TO 5 STEPS WITH RAILING: A LITTLE
DRESSING REGULAR UPPER BODY CLOTHING: A LITTLE
MOVING FROM LYING ON BACK TO SITTING ON SIDE OF FLAT BED WITH BEDRAILS: A LITTLE
PERSONAL GROOMING: A LITTLE
MOVING TO AND FROM BED TO CHAIR: A LITTLE
WALKING IN HOSPITAL ROOM: A LITTLE

## 2024-11-22 ASSESSMENT — ACTIVITIES OF DAILY LIVING (ADL): LACK_OF_TRANSPORTATION: NO

## 2024-11-22 ASSESSMENT — PAIN SCALES - GENERAL: PAINLEVEL_OUTOF10: 0 - NO PAIN

## 2024-11-22 NOTE — PROGRESS NOTES
11/22/24 1518   Discharge Planning   Support Systems Children   Type of Residence Assisted living   Care Facility Name [facility]   Expected Discharge Disposition Home   Does the patient need discharge transport arranged? Yes   RoundTrip coordination needed? Yes   Housing Stability   In the last 12 months, was there a time when you were not able to pay the mortgage or rent on time? N   At any time in the past 12 months, were you homeless or living in a shelter (including now)? N   Transportation Needs   In the past 12 months, has lack of transportation kept you from medical appointments or from getting medications? no   In the past 12 months, has lack of transportation kept you from meetings, work, or from getting things needed for daily living? No   Patient Choice   Patient / Family choosing to utilize agency / facility established prior to hospitalization Yes     Plan per Medical/Surgical team: GI and cardio consult  Status: observation  Payor source: Medicare  Discharge disposition: return to [facility]  Potential Barriers:   ADOD: 1-2 days

## 2024-11-22 NOTE — CARE PLAN
The patient's goals for the shift include  rest    The clinical goals for the shift include patient will be HDS this shift    Problem: Pain - Adult  Goal: Verbalizes/displays adequate comfort level or baseline comfort level  Outcome: Progressing     Problem: Safety - Adult  Goal: Free from fall injury  Outcome: Progressing     Problem: Discharge Planning  Goal: Discharge to home or other facility with appropriate resources  Outcome: Progressing     Problem: Chronic Conditions and Co-morbidities  Goal: Patient's chronic conditions and co-morbidity symptoms are monitored and maintained or improved  Outcome: Progressing

## 2024-11-22 NOTE — CONSULTS
Reason For Consult  Symptomatic anemia    History Of Present Illness  Michell Medrano is a 89 y.o. female with h/o choledocholithiasis, s/p recent laparoscopic cholecystectomy 10/11/24 c/b atrial flutter, RA, pancreatic cyst, HTN, chronic diastolic HF, KAROLINA, PFO per TTE 10/2024, hypothyroidism, GERD, chronic venous insufficiency, breast CA, who presented for possible allergic rxn. Pt was recently prescribed Apixaban and Flecainide for a fib/flutter. Reported difficulties with breathing and sensation of throat closing, she was changed to Xarelto, but still having persistent symptoms. She was seen by her PCP yesterday, had near syncopal episode, dizziness, lightheadedness, as well as difficulty breathing, she was administered epinephrine and sent to ED. and She was found with acute on chronic macrocytic anemia. Iron studies are consistent with anemia of chronic disease, B12 and folate are wnl. GI consulted for anemia and hemoccult positive stool. She denies melena or hematochezia, reports daily BM. No hematuria or vaginal bleeding. She takes Meloxicam for RA, denies other NSAIDs. She received solumedrol, benadryl, pepcid. She was transfused 1 unit of PRBC. Appears in no distress.    ERCP 10/10/24    1. Choledocholithiasis s/p biliary sphincterotomy and stone extraction as detailed above.   2. Filling of the cystic duct.   3. Normal ventral PD at the level of the head.     Colonoscopy  in 2009  -2 mm polyp removed form the sigmoid colon  -diverticulosis  Past Medical History  She has a past medical history of Personal history of other diseases of the musculoskeletal system and connective tissue (06/06/2016) and Polyosteoarthritis, unspecified (02/02/2018).    Surgical History  She has a past surgical history that includes Total hip arthroplasty (01/15/2014); Other surgical history (12/05/2022); Other surgical history (12/05/2022); and Total abdominal hysterectomy w/ bilateral salpingoophorectomy (12/10/2014).     Social  "History  She reports that she has never smoked. She has never used smokeless tobacco. She reports that she does not currently use alcohol. She reports that she does not use drugs.    Family History  No family history on file.     Allergies  Apixaban, Xarelto [rivaroxaban], Doxycycline, Erythromycin, Erythromycin base, Methen-m.blue-s.phos-phsal-hyo, Naproxen, Penicillins, Prednisone, Pseudoephedrine, Tetracycline, Cephalosporins, and Red dye    Review of Systems  10 systems reviewed and negative other than HPI     Physical Exam  Physical Exam  Vitals reviewed.   Constitutional:       Appearance: Normal appearance. She is normal weight.   HENT:      Head: Normocephalic and atraumatic.      Nose: Nose normal.      Mouth/Throat:      Pharynx: Oropharynx is clear.   Cardiovascular:      Rate and Rhythm: Tachycardia present. Rhythm irregular.      Heart sounds: Normal heart sounds.   Pulmonary:      Breath sounds: Normal breath sounds.   Abdominal:      General: Bowel sounds are normal. There is no distension.      Palpations: Abdomen is soft.      Tenderness: There is no abdominal tenderness. There is no guarding.   Musculoskeletal:      Cervical back: Neck supple.   Skin:     General: Skin is warm and dry.      Findings: Rash present. Rash is macular.   Neurological:      General: No focal deficit present.      Mental Status: She is alert and oriented to person, place, and time.   Psychiatric:         Mood and Affect: Mood normal.         Behavior: Behavior normal.            Last Recorded Vitals  Blood pressure 139/87, pulse 100, temperature 36.8 °C (98.2 °F), temperature source Temporal, resp. rate 17, height 1.626 m (5' 4\"), weight 67.1 kg (148 lb), SpO2 96%.    Relevant Results      Scheduled medications  allopurinol, 200 mg, oral, Daily  flecainide, 50 mg, oral, BID  gabapentin, 300 mg, oral, Nightly  pantoprazole, 40 mg, intravenous, BID   Followed by  [START ON 11/25/2024] pantoprazole, 40 mg, oral, BID " AC      Continuous medications     PRN medications  PRN medications: acetaminophen, melatonin, ondansetron, polyethylene glycol  CT soft tissue neck w IV contrast    Result Date: 11/21/2024  Interpreted By:  Dustin Narayan, STUDY: CT SOFT TISSUE NECK W IV CONTRAST;  11/21/2024 4:40 pm   INDICATION: Signs/Symptoms:neck pain, throat closing sensation.     COMPARISON: None.   ACCESSION NUMBER(S): [id number]   ORDERING CLINICIAN: ANTHONY CANTOR   TECHNIQUE: Axial CT images of the neck were obtained.  The patient received 75 ML of Omnipaque 350 intravenous contrast agent. The images were reformatted in angled axial, coronal and sagittal planes.   FINDINGS: No cervical chain lymphadenopathy. No soft tissue mass seen.   Normal-sized thyroid without suspicious mass. Parotid and submandibular glands are unremarkable.   No mass in the visualized portions intracranial compartment. Paranasal sinuses and mastoid air cells are well aerated.   No destructive osseous lesions or acute osseous abnormalities. Mild-to-moderate multilevel degenerative cervical disc disease. Moderate to advanced multilevel degenerative cervical facet disease. Partial osseous fusion C5-C6.   No flow-limiting stenosis throughout the arterial structures of the neck.       No evidence of cervical adenopathy or a soft tissue mass in the neck.   No acute findings.   Mild-to-moderate multilevel degenerative cervical disc disease. Moderate to advanced multilevel degenerative cervical facet disease. Partial osseous fusion C5-C6.   MACRO: None   Signed by: Dustin Narayan 11/21/2024 4:52 PM Dictation workstation:   [id number]    ECG 12 lead    Result Date: 11/21/2024  Normal sinus rhythm Low voltage QRS Cannot rule out Anterior infarct , age undetermined Abnormal ECG When compared with ECG of 10-SEP-2024 09:22, QT has lengthened See ED provider note for full interpretation and clinical correlation Confirmed by Otilia Calero ([id number]) on 11/21/2024  4:07:05 PM    XR chest 1 view    Result Date: 11/21/2024  Interpreted By:  Angie Self, STUDY: XR CHEST 1 VIEW;  11/21/2024 3:23 pm   INDICATION: Signs/Symptoms:SOB.   COMPARISON: 09/25/2024   ACCESSION NUMBER(S): [id number]   ORDERING CLINICIAN: ANTHONY CANTOR   FINDINGS: CARDIOMEDIASTINAL SILHOUETTE: Cardiomediastinal silhouette is normal in size and configuration.     LUNGS: Lungs are clear.   ABDOMEN: No remarkable upper abdominal findings.     BONES: No acute osseous changes. There are surgical clips in the left breast.       No acute cardiopulmonary process.   MACRO: None   Signed by: Angie Self 11/21/2024 3:38 PM Dictation workstation:   [id number]   Results for orders placed or performed during the hospital encounter of 11/21/24 (from the past 24 hours)   CBC and Auto Differential   Result Value Ref Range    WBC 5.0 4.4 - 11.3 x10*3/uL    nRBC 0.0 0.0 - 0.0 /100 WBCs    RBC 2.01 (L) 4.00 - 5.20 x10*6/uL    Hemoglobin 6.9 (L) 12.0 - 16.0 g/dL    Hematocrit 21.6 (L) 36.0 - 46.0 %     (H) 80 - 100 fL    MCH 34.3 (H) 26.0 - 34.0 pg    MCHC 31.9 (L) 32.0 - 36.0 g/dL    RDW 16.4 (H) 11.5 - 14.5 %    Platelets 224 150 - 450 x10*3/uL    Neutrophils % 46.3 40.0 - 80.0 %    Immature Granulocytes %, Automated 0.4 0.0 - 0.9 %    Lymphocytes % 42.5 13.0 - 44.0 %    Monocytes % 7.8 2.0 - 10.0 %    Eosinophils % 2.6 0.0 - 6.0 %    Basophils % 0.4 0.0 - 2.0 %    Neutrophils Absolute 2.30 1.60 - 5.50 x10*3/uL    Immature Granulocytes Absolute, Automated 0.02 0.00 - 0.50 x10*3/uL    Lymphocytes Absolute 2.11 0.80 - 3.00 x10*3/uL    Monocytes Absolute 0.39 0.05 - 0.80 x10*3/uL    Eosinophils Absolute 0.13 0.00 - 0.40 x10*3/uL    Basophils Absolute 0.02 0.00 - 0.10 x10*3/uL   Comprehensive metabolic panel   Result Value Ref Range    Glucose 133 (H) 74 - 99 mg/dL    Sodium 133 (L) 136 - 145 mmol/L    Potassium 3.6 3.5 - 5.3 mmol/L    Chloride 102 98 - 107 mmol/L    Bicarbonate 22 21 - 32 mmol/L    Anion Gap  13 10 - 20 mmol/L    Urea Nitrogen 22 6 - 23 mg/dL    Creatinine 0.92 0.50 - 1.05 mg/dL    eGFR 60 (L) >60 mL/min/1.73m*2    Calcium 7.9 (L) 8.6 - 10.3 mg/dL    Albumin 3.2 (L) 3.4 - 5.0 g/dL    Alkaline Phosphatase 106 33 - 136 U/L    Total Protein 5.8 (L) 6.4 - 8.2 g/dL    AST 24 9 - 39 U/L    Bilirubin, Total 0.7 0.0 - 1.2 mg/dL    ALT 15 7 - 45 U/L   Troponin I, High Sensitivity   Result Value Ref Range    Troponin I, High Sensitivity 16 (H) 0 - 13 ng/L   TSH with reflex to Free T4 if abnormal   Result Value Ref Range    Thyroid Stimulating Hormone 2.48 0.44 - 3.98 mIU/L   ECG 12 lead   Result Value Ref Range    Ventricular Rate 88 BPM    Atrial Rate 88 BPM    SD Interval 194 ms    QRS Duration 92 ms    QT Interval 416 ms    QTC Calculation(Bazett) 503 ms    P Axis 59 degrees    R Axis 28 degrees    T Axis 55 degrees    QRS Count 14 beats    Q Onset 217 ms    P Onset 120 ms    P Offset 191 ms    T Offset 425 ms    QTC Fredericia 472 ms   Prepare RBC: 1 Units   Result Value Ref Range    PRODUCT CODE N0287M22     Unit Number [id number]     Unit ABO A     Unit RH POS     XM INTEP COMP     Dispense Status TR     Blood Expiration Date 12/10/2024 11:59:00 PM EST     PRODUCT BLOOD TYPE 6200     UNIT VOLUME 350    Type and Screen   Result Value Ref Range    ABO TYPE A     Rh TYPE POS     ANTIBODY SCREEN NEG    VERIFY ABO/Rh Group Test   Result Value Ref Range    ABO TYPE A     Rh TYPE POS    Occult Blood, Stool    Specimen: Stool   Result Value Ref Range    Occult Blood, Stool X1 Positive (A) Negative   Folate   Result Value Ref Range    Folate, Serum 12.0 >5.0 ng/mL   Reticulocytes   Result Value Ref Range    Retic % 5.6 (H) 0.5 - 2.0 %    Retic Absolute 0.153 (H) 0.017 - 0.110 x10*6/uL    Reticulocyte Hemoglobin 33 28 - 38 pg    Immature Retic fraction 31.8 (H) <=16.0 %   Vitamin B12   Result Value Ref Range    Vitamin B12 621 211 - 911 pg/mL   CBC   Result Value Ref Range    WBC 3.0 (L) 4.4 - 11.3 x10*3/uL     nRBC 0.0 0.0 - 0.0 /100 WBCs    RBC 2.32 (L) 4.00 - 5.20 x10*6/uL    Hemoglobin 7.7 (L) 12.0 - 16.0 g/dL    Hematocrit 24.0 (L) 36.0 - 46.0 %     (H) 80 - 100 fL    MCH 33.2 26.0 - 34.0 pg    MCHC 32.1 32.0 - 36.0 g/dL    RDW 19.9 (H) 11.5 - 14.5 %    Platelets 198 150 - 450 x10*3/uL   Basic metabolic panel   Result Value Ref Range    Glucose 152 (H) 74 - 99 mg/dL    Sodium 136 136 - 145 mmol/L    Potassium 3.4 (L) 3.5 - 5.3 mmol/L    Chloride 105 98 - 107 mmol/L    Bicarbonate 23 21 - 32 mmol/L    Anion Gap 11 10 - 20 mmol/L    Urea Nitrogen 21 6 - 23 mg/dL    Creatinine 0.87 0.50 - 1.05 mg/dL    eGFR 64 >60 mL/min/1.73m*2    Calcium 8.0 (L) 8.6 - 10.3 mg/dL   Lavender Top   Result Value Ref Range    Extra Tube Hold for add-ons.    Troponin I, High Sensitivity   Result Value Ref Range    Troponin I, High Sensitivity 23 (H) 0 - 13 ng/L   CBC and Auto Differential   Result Value Ref Range    WBC 3.4 (L) 4.4 - 11.3 x10*3/uL    nRBC 0.0 0.0 - 0.0 /100 WBCs    RBC 2.36 (L) 4.00 - 5.20 x10*6/uL    Hemoglobin 7.8 (L) 12.0 - 16.0 g/dL    Hematocrit 23.8 (L) 36.0 - 46.0 %     (H) 80 - 100 fL    MCH 33.1 26.0 - 34.0 pg    MCHC 32.8 32.0 - 36.0 g/dL    RDW 19.6 (H) 11.5 - 14.5 %    Platelets 220 150 - 450 x10*3/uL    Neutrophils % 81.6 40.0 - 80.0 %    Immature Granulocytes %, Automated 0.6 0.0 - 0.9 %    Lymphocytes % 16.3 13.0 - 44.0 %    Monocytes % 1.5 2.0 - 10.0 %    Eosinophils % 0.0 0.0 - 6.0 %    Basophils % 0.0 0.0 - 2.0 %    Neutrophils Absolute 2.80 1.60 - 5.50 x10*3/uL    Immature Granulocytes Absolute, Automated 0.02 0.00 - 0.50 x10*3/uL    Lymphocytes Absolute 0.56 (L) 0.80 - 3.00 x10*3/uL    Monocytes Absolute 0.05 0.05 - 0.80 x10*3/uL    Eosinophils Absolute 0.00 0.00 - 0.40 x10*3/uL    Basophils Absolute 0.00 0.00 - 0.10 x10*3/uL   SST TOP   Result Value Ref Range    Extra Tube Hold for add-ons.      *Note: Due to a large number of results and/or encounters for the requested time period, some  results have not been displayed. A complete set of results can be found in Results Review.          Assessment/Plan     89 y.o. F with h/o choledocholithiasis, s/p lap cholecystectomy 10/11/24 c/b atrial flutter, RA, pancreatic cyst, HTN, chronic diastolic HF, KAROLINA, PFO, hypothyroidism, GERD, chronic venous insufficiency, breast CA, presented for possible allergic rxn. Recently started on Flecainide, Eliquis and then Xarelto. GI consulted for anemia. He anemia is macrocytic, chronic. She is on iron. No evidence of overt bleeding. Suspect multifactorial anemia, anemia of chronic disease, recent surgery/ERCP, in the setting of anticoagulation, possibly bone marrow disorder. Do not suspect significant GI blood loss    -monitor H&H, transfuse as needed  -ok to advance diet  -no plans for inpatient GI work up  -ok to resume anticoagulation if needed  -monitor for bleeding  -recommends op follow up with hematology  No barriers to discharge from GI stand point    FLOWER Moreno-CNP

## 2024-11-22 NOTE — PROGRESS NOTES
"Medicine NP follow up note    Subjective: Sitting in chair, comfortable.  States she has improved HA, dizziness, and throat discomfort/tightness, but still does not feel to BL prior to her \"reaction\" to medications.  She states she stopped taking her xaralto on the wed night prior to this admission, and stopped taking her flecainide Thursday morning.  Has declined her flecainide since being admitted.  Denies any chest pain or palpitations.        Additional information:  Hgb responded to transfused unit, 7.7.  HDS, K 3.4 will replenish with PO.  She is without leukocytosis and afebrile, no elevated eosinophils.  Histamine lab pending, throat does not appear swollen, airway does not appear edematous or obstructed, no adventitious sounds auscultated.  GI consulted for GIB, cardiology consulted for aflutter medication management.  Cleared by both team, patient feels weak and unsteady still, will hold overnight with PT, DC tmrw.       Vitals (Last 24 Hours):  Heart Rate:  []   Temp:  [36.4 °C (97.6 °F)-36.8 °C (98.2 °F)]   Resp:  [13-23]   BP: (112-163)/(53-93)   Height:  [162.6 cm (5' 4\")]   Weight:  [67.1 kg (148 lb)]   SpO2:  [95 %-100 %]       I have reviewed all imaging reports and labs pertinent to this visit / presenting problem    PHYSICAL EXAM:  Constitutional: NAD, alert and cooperative  Eyes: no icterus  ENMT: mucous membranes moist, no lesions  Head/Neck: supple  Respiratory/Thorax: CTA bilaterally, non-labored breathing, no cough, on RA  Cardiovascular: RRR, no murmurs heard  Gastrointestinal: ND/S/NT  : no Cardona, no SP/flank discomfort  Musculoskeletal: no joint swelling, ROM intact  Extremities: no edema  Neurological: non-focal  Skin: warm and dry  Psych: calm, stable mood     Results for orders placed or performed during the hospital encounter of 11/21/24 (from the past 24 hours)   Occult Blood, Stool    Specimen: Stool   Result Value Ref Range    Occult Blood, Stool X1 Positive (A) Negative "   Folate   Result Value Ref Range    Folate, Serum 12.0 >5.0 ng/mL   Reticulocytes   Result Value Ref Range    Retic % 5.6 (H) 0.5 - 2.0 %    Retic Absolute 0.153 (H) 0.017 - 0.110 x10*6/uL    Reticulocyte Hemoglobin 33 28 - 38 pg    Immature Retic fraction 31.8 (H) <=16.0 %   Vitamin B12   Result Value Ref Range    Vitamin B12 621 211 - 911 pg/mL   CBC   Result Value Ref Range    WBC 3.0 (L) 4.4 - 11.3 x10*3/uL    nRBC 0.0 0.0 - 0.0 /100 WBCs    RBC 2.32 (L) 4.00 - 5.20 x10*6/uL    Hemoglobin 7.7 (L) 12.0 - 16.0 g/dL    Hematocrit 24.0 (L) 36.0 - 46.0 %     (H) 80 - 100 fL    MCH 33.2 26.0 - 34.0 pg    MCHC 32.1 32.0 - 36.0 g/dL    RDW 19.9 (H) 11.5 - 14.5 %    Platelets 198 150 - 450 x10*3/uL   Basic metabolic panel   Result Value Ref Range    Glucose 152 (H) 74 - 99 mg/dL    Sodium 136 136 - 145 mmol/L    Potassium 3.4 (L) 3.5 - 5.3 mmol/L    Chloride 105 98 - 107 mmol/L    Bicarbonate 23 21 - 32 mmol/L    Anion Gap 11 10 - 20 mmol/L    Urea Nitrogen 21 6 - 23 mg/dL    Creatinine 0.87 0.50 - 1.05 mg/dL    eGFR 64 >60 mL/min/1.73m*2    Calcium 8.0 (L) 8.6 - 10.3 mg/dL   Lavender Top   Result Value Ref Range    Extra Tube Hold for add-ons.    Troponin I, High Sensitivity   Result Value Ref Range    Troponin I, High Sensitivity 23 (H) 0 - 13 ng/L   CBC and Auto Differential   Result Value Ref Range    WBC 3.4 (L) 4.4 - 11.3 x10*3/uL    nRBC 0.0 0.0 - 0.0 /100 WBCs    RBC 2.36 (L) 4.00 - 5.20 x10*6/uL    Hemoglobin 7.8 (L) 12.0 - 16.0 g/dL    Hematocrit 23.8 (L) 36.0 - 46.0 %     (H) 80 - 100 fL    MCH 33.1 26.0 - 34.0 pg    MCHC 32.8 32.0 - 36.0 g/dL    RDW 19.6 (H) 11.5 - 14.5 %    Platelets 220 150 - 450 x10*3/uL    Neutrophils % 81.6 40.0 - 80.0 %    Immature Granulocytes %, Automated 0.6 0.0 - 0.9 %    Lymphocytes % 16.3 13.0 - 44.0 %    Monocytes % 1.5 2.0 - 10.0 %    Eosinophils % 0.0 0.0 - 6.0 %    Basophils % 0.0 0.0 - 2.0 %    Neutrophils Absolute 2.80 1.60 - 5.50 x10*3/uL    Immature  Granulocytes Absolute, Automated 0.02 0.00 - 0.50 x10*3/uL    Lymphocytes Absolute 0.56 (L) 0.80 - 3.00 x10*3/uL    Monocytes Absolute 0.05 0.05 - 0.80 x10*3/uL    Eosinophils Absolute 0.00 0.00 - 0.40 x10*3/uL    Basophils Absolute 0.00 0.00 - 0.10 x10*3/uL   SST TOP   Result Value Ref Range    Extra Tube Hold for add-ons.    Holter Or Event Cardiac Monitor   Result Value Ref Range    BSA 1.74 m2     *Note: Due to a large number of results and/or encounters for the requested time period, some results have not been displayed. A complete set of results can be found in Results Review.     MEDS:  Scheduled meds  allopurinol, 200 mg, oral, Daily  flecainide, 50 mg, oral, BID  gabapentin, 300 mg, oral, Nightly  pantoprazole, 40 mg, intravenous, BID   Followed by  [START ON 11/25/2024] pantoprazole, 40 mg, oral, BID AC        Continuous meds       PRN meds  PRN medications: acetaminophen, melatonin, ondansetron, polyethylene glycol      ASSESSMENT/PLAN:    Michell Medrano is a 89 y.o. female who presents ED for possible allergic rxn.    She has a PMH of choledocholithiasis and chronic cholecystitis s/p recent laparoscopic cholecystectomy 10/11/24 c/b atrial flutter, RA, pancreatic cyst, HTN, chronic diastolic HF, KAROLINA, PFO per TTE 10/2024, hypothyroidism, GERD, chronic venous insufficiency, and remote breast CA.     Patient reports intermittent tightness in her throat/throat closing sensation and SOB since October when she was started on new medications for atrial flutter, including elilquis and flecainide. Her PCP stopped Eliquis a week ago and placed her on Xarelto but throat tightness continued. She was seen in office today and experienced throat tightness, SOB, shakiness, and fatigue after walking into the office. She was administered Epinephrine via EpiPen with improvement in symptoms. She was then taken to the ED by EMS.  She also notes constant dizziness since her surgery, does not tolerate Meclizine. She was  additionally taken off Diltiazem since admission d/t reported symptomatic low BP at home, which patient states has improved. Patient also notes increased edema recently and concern for fluid retention for which she was prescribed Lasix 20mg x5 days on top of her daily Torsemide 10mg. She did not feel the Lasix had much effect. Her PCP had also recently instructed her to decrease the Flecainide to daily to see if her symptoms improved, which she is unable to tell me if that helped or not.     ED course notable for stable VS. Labs with worsening anemia, Hgb 6.9 from 8.0 on 11/7 and 11.6 in September, eosinophils not elevated at 2.6, mild hyponatremia, and slight troponin elevation.  EKG with NSR.  CT neck negative for soft tissue mass and scope revealed no supra epiglottic edema.  -Patient received Solu-Medrol, Benadryl, and Pepcid IV      Problem List    #Acute Anemia  Assessment:  -Hgb 6.9 megaloblastic on admission, HDS, elevated reticulocyte count  -S/P 1 unit, responded hgb now 7.7., HDS and well perfused and oxygenated  -Has been on Eliquis/Xarelto, stopped d/t concern for allergic rxn  -Occult stool positive  -Iron 41, TIBC 242  -Ferritin 780  Plan:  -Per GI   :monitor H & H, transfuse as needed   :ok to advance diet   :No GI LA   :FU OP with hematology   :No barrier to DC  -B12 and folic acid labs pending  -c/w PO Iron daily  -Consider GI consult, when was last colonoscopy  -IV PPI ordered    #Possible Allergic Rxn  Atrial Flutter  Assessment:  -Patient c/o intermittent throat tightness since October since starting eliquis and flecainide   -Improved s/p EpiPen as outpatient  -Received Solumedrol, Pepcid, and Benadryl in ED  -Eliquis stopped and transitioned to Xarelto without improvement in sx  -?allergy to DOACs, discuss with pharmacy in AM  -Also recently started on Flecainide, Cardizem has since been discontinued  -On RA, imaging and scoping without CF for supraglottic swelling   -Eosinophils not  elevated  Plan:  -CTM respiratory status  -Obtain histamine level  -Cardiology consulted, waiting for recommendation    #pAF/flutter  Assessment:  -Currently NSR per ECG  -She is declining Flecainide for rate/rhythm control at this time  -She stopped taking her xralto on wednight, and stopped taking her flecainide on thurday morning  -Hold anticoagulation d/t concern for active bleeding  -May need to consider Coumadin if unable to tolerate DOACs  -[id number] score for Atrial Fibrillation Stroke Risk is 4, moderate-high for thromboembolic event  -Has FU with cardiology in December to establish care  Plan:  -Per cardiology   :DC with event monitor x 2 weeks, results to kristi   :Ok to remain off diltiazem and flecainide   :Ok to remain of apixiban and rivaroxaban   :Allergy referral O/P    #Chronic Diastolic HF  #Chronic Venous Insufficiency   -Imaging on admission was CF some pulmonary congestion   -Euvolemic on exam  -Hold home Torsemide in the setting of acute anemia     #Hx of Gout  -No s/s of flare  -Ppx with Allopurinol      #GERD  -PPI as above, resume PO at discharge    DVT Prophylaxis  -On hold in setting of suspected GIB and anemia    DC Disposition    Discussed with interdisciplinary team    Fabian Kessler, APRN-CNP

## 2024-11-22 NOTE — PROGRESS NOTES
Pharmacy Medication History Review    Michell Medrano is a 89 y.o. female admitted for Acute anemia. Pharmacy reviewed the patient's [id number] medications and allergies for accuracy.    Below are additional concerns with the patient's PTA list.  Collected from patient. Patient was started on Eliquis and Flecainide on 10/31.  The weekend of 11/16 patient was switched from Eliquis to Xarelto d/t wheezing/dyspnea. Symptoms persisted after switching so she was instructed to stop Xarelto on Tuesday 11/19.  Patient was at her physicians office 11/21 and it was about 2 hours after taking her AM flecainide and experienced severe SOB/wheezing.     The list below reflectives the updated PTA list. Please review each medication in order reconciliation for additional clarification and justification.    Prior to Admission Medications   Prescriptions   acetaminophen (Tylenol) 325 mg tablet   Sig: Take 2 tablets (650 mg) by mouth every 6 hours if needed.   allopurinol (Zyloprim) 100 mg tablet   Sig: Take 2 tablets (200 mg) by mouth once daily.   ergocalciferol (Vitamin D-2) 1.25 MG (32117 UT) capsule   Sig: Take 1 capsule (1,250 mcg) by mouth every 30 (thirty) days. Taking 2 times a month   Patient taking differently: Take 1 capsule (1,250 mcg) by mouth every 14 (fourteen) days. 1st and 15th of every month   ferrous sulfate 325 (65 Fe) MG EC tablet   Sig: Take 65 mg by mouth once daily with breakfast. Do not crush, chew, or split.   flecainide (Tambocor) 50 mg tablet   Sig: Take 1 tablet (50 mg) by mouth 2 times a day.   gabapentin (Neurontin) 300 mg capsule   Sig: Take 1 capsule (300 mg) by mouth once daily at bedtime.   levothyroxine (Synthroid, Levoxyl) 50 mcg tablet   Sig: Take 1 tablet (50 mcg) by mouth early in the morning.. Take on an empty stomach at the same time each day, either 30 to 60 minutes prior to breakfast   methotrexate (Trexall) 2.5 mg tablet   Sig: Take 8 tablets (20 mg total) by mouth 1 (one) time per  week.  Follow directions carefully, and ask to explain any part you do not understand. Take exactly as directed.   pantoprazole (ProtoNix) 20 mg EC tablet   Sig: Take 1 tablet (20 mg) by mouth once daily.   polyethylene glycol (Glycolax, Miralax) 17 gram packet   Sig: Take 17 g by mouth once daily.   potassium chloride CR (Klor-Con) 10 mEq ER tablet   Sig: Take 1 tablet (10 mEq) by mouth 3 times a day.   torsemide (Demadex) 10 mg tablet   Sig: Take 1 tablet (10 mg) by mouth once daily.      Facility-Administered Medications: None        Betty Cantrell, PharmD

## 2024-11-22 NOTE — DISCHARGE SUMMARY
Discharge Diagnosis  Acute anemia      Issues Requiring Follow-Up  Holter monitor- instructions given to patient and daughter at bedside  GI referral per PCP  Hematology and immunology referral    Allergic Rxn like Symptoms  -Has been complaining of throat tingling, swelling, SOB since starting elieuiq-> xarelto and flecainide for her A flutter which was diagnosed in October.   -Was at OP PCP appointment, where she complained of SOB, dizziness, facial/throat swelling.  Was given epi pen and send to ED per EMS.  -In ED, HDS, imaging and scoping not concerning for compormised airway.      Michell Medrano is a 89 y.o. female who presents ED for possible allergic rxn.     She has a PMH of choledocholithiasis and chronic cholecystitis s/p recent laparoscopic cholecystectomy 10/11/24 c/b atrial flutter, RA, pancreatic cyst, HTN, chronic diastolic HF, KAROLINA, PFO per TTE 10/2024, hypothyroidism, GERD, chronic venous insufficiency, and remote breast CA.      Patient reports intermittent tightness in her throat/throat closing sensation and SOB since October when she was started on new medications for atrial flutter, including elilquis and flecainide. Her PCP stopped Eliquis a week ago and placed her on Xarelto but throat tightness continued. She was seen in office today and experienced throat tightness, SOB, shakiness, and fatigue after walking into the office. She was administered Epinephrine via EpiPen with improvement in symptoms. She was then taken to the ED by EMS.  She also notes constant dizziness since her surgery, does not tolerate Meclizine. She was additionally taken off Diltiazem since admission d/t reported symptomatic low BP at home, which patient states has improved. Patient also notes increased edema recently and concern for fluid retention for which she was prescribed Lasix 20mg x5 days on top of her daily Torsemide 10mg. She did not feel the Lasix had much effect. Her PCP had also recently instructed her to  decrease the Flecainide to daily to see if her symptoms improved, which she is unable to tell me if that helped or not.    She stopped taking the eliquis the wed night prior to this admission, and the flecainide the Thursday morning prior to this admission.      ED course notable for stable VS. Labs with worsening anemia, Hgb 6.9 from 8.0 on 11/7 and 11.6 in September, eosinophils not elevated at 2.6, mild hyponatremia, and slight troponin elevation.  EKG with NSR.  CT neck negative for soft tissue mass and scope revealed no supra epiglottic edema.  -Patient received Solu-Medrol, Benadryl, and Pepcid IV     Her hgb responded to the blood transfusion, and was stable at 7.8.  GI saw her, and cleared her for DC, no intervention, ok from their perspective to resume AC.  Cardiology saw her, and recommended DC with 2 week holter monitor, ok to discharge of AC and anti arhythmia medication.  As she remained HDS, her symptoms improved, and her Hgb remained stable, we are discharging her home with FU for OP hematology, OP allergy, and OP FU with her PCP.     Problem List     #Acute Anemia  Assessment:  Acute on chronic macrocytic anemia, cf chronic GI blood loss iso recent AC for PAF  - acute on chronic macrocytic anemia. Iron studies are consistent with anemia of chronic disease, B12 and folate are wnl. Denied melena or hematochezia.    - Hb in Sept, 2024 was 11-> 8 2 weeks ago-> 6.9 on 11/21-> 7.7 s/p blood transfusion-> 8.4 today, reports feeling better  -  h/o choledocholithiasis, s/p lap cholecystectomy 10/11/24 c/b atrial flutter, started on apixaban   - Reported difficulties with breathing and sensation of throat closing, she was changed to Xarelto   - presented with persistent symptoms  - consider DOAC allergy, AC is on hold  - Per GI - no plans for inpatient GI work up, recommended outpatient Hematology follow up for macrocytic anemia concerning for MDS vs MF  - GI cleared for anticoagulation(hold per cardiology)  -  KAROLINA- oral iron daily     PAF,  PFO per TTE 10/2024, chronic diastolic heart failure  -Currently NSR per ECG  -She is declining Flecainide for rate/rhythm control at this time  -She stopped taking her xralto on wednight, and stopped taking her flecainide on thurday morning  -Hold anticoagulation d/t concern for active bleeding  -May need to consider Coumadin if unable to tolerate DOACs  -[id number] score for Atrial Fibrillation Stroke Risk is 4, moderate-high for thromboembolic event  -Has FU with cardiology in December to establish care, Dr. Castillo: 12/5  - Imaging on admission was CF some pulmonary congestion   -Euvolemic on exam  -Hold home Torsemide in the setting of acute anemia- resume at discharge   Plan:  -Per cardiology              :DC with event monitor x 2 weeks, results to Jonathan              :Ok to remain off diltiazem and flecainide              :Ok to remain of apixiban and rivaroxaban              :Allergy referral O/P   Plan:  -Per GI              :monitor H & H, transfuse as needed              :ok to advance diet              :No GI LA              :FU OP with hematology              :No barrier to DC  -B12 and folic acid labs pending  -c/w PO Iron daily  -Consider GI consult, when was last colonoscopy  -IV PPI ordered     #Possible Allergic Rxn  Atrial Flutter  Assessment:  -Patient c/o intermittent throat tightness since October since starting eliquis and flecainide   -Improved s/p EpiPen as outpatient  -Received Solumedrol, Pepcid, and Benadryl in ED  -Eliquis stopped and transitioned to Xarelto without improvement in sx  -?allergy to DOACs, discuss with pharmacy in AM  -Also recently started on Flecainide, Cardizem has since been discontinued  -On RA, imaging and scoping without CF for supraglottic swelling   -Eosinophils not elevated  Plan:  -CTM respiratory status  -Obtain histamine level  -Cardiology consulted, waiting for recommendation     #pAF/flutter  Assessment:  -Currently NSR per  ECG  -She is declining Flecainide for rate/rhythm control at this time  -She stopped taking her xralto on wednight, and stopped taking her flecainide on thurday morning  -Hold anticoagulation d/t concern for active bleeding  -May need to consider Coumadin if unable to tolerate DOACs  -[id number] score for Atrial Fibrillation Stroke Risk is 4, moderate-high for thromboembolic event  -Has FU with cardiology in December to establish care  Plan:  -Per cardiology              :DC with event monitor x 2 weeks, results to kristi              :Ok to remain off diltiazem and flecainide              :Ok to remain of apixiban and rivaroxaban              :Allergy referral O/P     #Chronic Diastolic HF  #Chronic Venous Insufficiency   -Imaging on admission was CF some pulmonary congestion   -Euvolemic on exam  -Hold home Torsemide in the setting of acute anemia     #Hx of Gout  -No s/s of flare  -Ppx with Allopurinol      #GERD  -PPI as above, resume PO at discharge    Rheumatoid arthritis  - continue methotrexate   - would resume folic acid        DC Disposition     To [place]     Discharge Meds     Medication List      START taking these medications     folic acid 1 mg tablet; Commonly known as: Folvite; Take 1 tablet (1 mg)   by mouth once daily.     CHANGE how you take these medications     ergocalciferol 1.25 MG (80754 UT) capsule; Commonly known as: Vitamin   D-2; Take 1 capsule (1,250 mcg) by mouth every 30 (thirty) days. Taking 2   times a month     CONTINUE taking these medications     acetaminophen 325 mg tablet; Commonly known as: Tylenol   allopurinol 100 mg tablet; Commonly known as: Zyloprim; Take 2 tablets   (200 mg) by mouth once daily.   ferrous sulfate 325 (65 Fe) MG EC tablet   gabapentin 300 mg capsule; Commonly known as: Neurontin; Take 1 capsule   (300 mg) by mouth once daily at bedtime.   levothyroxine 50 mcg tablet; Commonly known as: Synthroid, Levoxyl   methotrexate 2.5 mg tablet; Commonly  known as: Trexall; Take 8 tablets   (20 mg total) by mouth 1 (one) time per week.  Follow directions   carefully, and ask to explain any part you do not understand. Take exactly   as directed.   pantoprazole 20 mg EC tablet; Commonly known as: ProtoNix   polyethylene glycol 17 gram packet; Commonly known as: Glycolax, Miralax   potassium chloride CR 10 mEq ER tablet; Commonly known as: Klor-Con   torsemide 10 mg tablet; Commonly known as: Demadex     STOP taking these medications     flecainide 50 mg tablet; Commonly known as: Tambocor       Test Results Pending At Discharge  Pending Labs       Order Current Status    Histamine In process        [facility]    Hospital Course   See above    Pertinent Physical Exam At Time of Discharge  Physical Exam  Vitals and nursing note reviewed.   Constitutional:       General: She is not in acute distress.     Appearance: She is not ill-appearing or toxic-appearing.   HENT:      Head: Normocephalic and atraumatic.      Nose: No congestion.      Mouth/Throat:      Mouth: Mucous membranes are moist.   Eyes:      General: No scleral icterus.  Cardiovascular:      Rate and Rhythm: Normal rate and regular rhythm.      Pulses: Normal pulses.   Pulmonary:      Effort: Pulmonary effort is normal.      Breath sounds: Normal breath sounds.   Abdominal:      Palpations: Abdomen is soft.   Musculoskeletal:      Cervical back: Neck supple.      Right lower leg: No edema.      Left lower leg: No edema.   Skin:     Findings: Bruising present. No rash.   Neurological:      General: No focal deficit present.      Mental Status: She is alert and oriented to person, place, and time.         Outpatient Follow-Up  Future Appointments   Date Time Provider Department Center   12/4/2024 10:40 AM Prachi Lauren MD [id number] [facility]   12/5/2024  1:00 PM Mauricio Castillo MD [id number] [facility]   12/9/2024 11:45 AM Alexa Gross MD [id number] [facility]         Jada Feldman, APRN-CNP

## 2024-11-22 NOTE — CONSULTS
Inpatient consult to Cardiology  Consult performed by: FLOWER Dyer-CNP  Consult ordered by: FLOWER Kapoor-CNP  Reason for consult: Pt with hx aflcristel, was on xarelto and flecainide, thinks she is having an allergic reaction to them. Stop taking them Wednesday night.  Assessment/Recommendations: Hold Anticoagulation due to Anemia.   Hold Diltiazem / Dofetilide due to possible allergic response   -- O/P Allergy assessment is recommend to determine if truly allergic to medications used  -- Given the patient's symptoms and in order to further evaluate possible arrhythmias, we will plan to perform an event monitor. The results should be forwarded to Dr LYNDSEY Castillo.           History Of Present Illness:    Michell Medrano is a 89 y.o. female with past medical history significant for Paroxysmal atrial flutter recently stopped taking anticoagulation, Chronic diastolic heart failure, Choledocholithiasis and chronic cholecystitis s/p recent laparoscopic cholecystectomy 10/11/24,  Hypertension, Rheumatoid arthritis, Pancreatic cysts,  KAROLINA, PFO per TTE 10/2024, hypothyroidism, GERD, chronic venous insufficiency, and remote breast CA. Presented to [facility] with complains of possible allergic reaction. Cardiology is consulted for  hx morena, was on xarelto and flecainide, thinks she is having an allergic reaction to them. Stop taking them Wednesday night.       Review of records showed patient  was admitted to [facility] 10/5/2024-10/17/2024. Was found to have  calculus cholecystitis/choledocholithiasis s/p ERCP with stone retrieval and s/p lap cholecystectomy 10/11/24 .  This was complicated by atrial flutter.  She was seen by electrophysiology and was started on  flecainide and Eliquis. Patient  developed tightness in her throat and Eliquis was stopped 11/9. She was then started on Xarelto. Reportedly also taken off Diltiazem due to symptomatic hypotension as home. States she continued to notice discomfort in  her  throat.     Patient went for a scheduled outpatient appointment with her.  Patient states that she started to feel her throat closing. States she was having a hard time breathing.   Patient was treated with an Epipen in office and transported to [facility] [place]. According to documentation, she was in respiratory distress in route. No reported desaturation.   In the emergency room she was treated with Solu-Medrol, Benadryl and IV Pepcid with reported ongoing resolution of symptoms. Labs remarkable for worsening anemia with hemoglobin 6.9 hematocrit 23.8 Platelets 224 High sensitivity troponin 16/23  K 3.6 BUN 22 creatinine 0.92  TSH 2.48.   She was treated with one unit of PRBC. Repeat hemoglobin 7.7/7.8 . GI has been consulted.     EKG Showed normal sinus rhythm. Chest x-ray with no acute cardiopulmonary process. CT neck negative for soft tissue mass and scope revealed no supraepiglottic edema.     Of note, patient does not follow with cardiology.  Has new patient appointment with Dr. Castillo scheduled on 12/5/2024. She follows with Dr. Lauren with vascular.      Last Recorded Vitals:  Vitals:    11/21/24 2215 11/21/24 2324 11/22/24 0500 11/22/24 0846   BP: 133/69 146/79 126/67 139/87   BP Location:  Right arm Right arm Right arm   Patient Position:  Lying Lying Lying   Pulse: 81 84 88 100   Resp: 16 17 18 17   Temp:  36.5 °C (97.7 °F) 36.6 °C (97.9 °F) 36.8 °C (98.2 °F)   TempSrc:  Temporal Temporal Temporal   SpO2: 96% 96% 95% 96%   Weight:       Height:           Last Labs:  LABS:  CMP:  Results from last 7 days   Lab Units 11/22/24  0635 11/21/24  1457   SODIUM mmol/L 136 133*   POTASSIUM mmol/L 3.4* 3.6   CHLORIDE mmol/L 105 102   CO2 mmol/L 23 22   ANION GAP mmol/L 11 13   BUN mg/dL 21 22   CREATININE mg/dL 0.87 0.92   EGFR mL/min/1.73m*2 64 60*   ALBUMIN g/dL  --  3.2*   ALT U/L  --  15   AST U/L  --  24   BILIRUBIN TOTAL mg/dL  --  0.7     CBC:  Results from last 7 days   Lab Units 11/22/24  0906 11/22/24  0635  11/21/24  1457   WBC AUTO x10*3/uL 3.4* 3.0* 5.0   HEMOGLOBIN g/dL 7.8* 7.7* 6.9*   HEMATOCRIT % 23.8* 24.0* 21.6*   PLATELETS AUTO x10*3/uL 220 198 224   MCV fL 101* 103* 108*     COAG:     ABO:   ABO TYPE   Date Value Ref Range Status   11/21/2024 A  Final     HEME/ENDO:  Results from last 7 days   Lab Units 11/21/24  1457   TSH mIU/L 2.48      CARDIAC:   Results from last 7 days   Lab Units 11/21/24  1457   TROPHS ng/L 16*     Recent Labs     03/25/24  1016 03/22/23  1806 03/24/22  1020 03/17/21  1134   CHOL 220* 196 191 214*   LDLF  --  112* 104* 126*   LDLCALC 127*  --   --   --    HDL 69.8 60.0 62.8 65.3   TRIG 114 121 121 113      Imagine Results  CT soft tissue neck w IV contrast   Final Result   No evidence of cervical adenopathy or a soft tissue mass in the neck.        No acute findings.        Mild-to-moderate multilevel degenerative cervical disc disease.   Moderate to advanced multilevel degenerative cervical facet disease.   Partial osseous fusion C5-C6.        MACRO:   None        Signed by: Dustin Narayan 11/21/2024 4:52 PM   Dictation workstation:   [id number]      XR chest 1 view   Final Result   No acute cardiopulmonary process.        MACRO:   None        Signed by: Angie Self 11/21/2024 3:38 PM   Dictation workstation:   [id number]            Last I/O:  I/O last 3 completed shifts:  In: 350 (5.2 mL/kg) [Blood:350]  Out: - (0 mL/kg)   Weight: 67.1 kg     Past Cardiology Tests (Last 3 Years):  EKG:  ECG 12 lead 11/21/2024      Echo:  10/8/2024- [facility]   CONCLUSIONS:   - Exam indication: Chest Pain   - The left ventricle is normal in size. Left ventricular systolic function is normal. EF = 55 ± 5% (2D biplane) Grade II left ventricular diastolic dysfunction.   - The right ventricle is dilated. Right ventricular systolic function is normal.   - The left atrial cavity is moderately dilated.   - There is mild (1+ - 2+) tricuspid valve regurgitation.   - Estimated right ventricular  systolic pressure is 50 mmHg consistent with moderate pulmonary hypertension. Estimated right atrial pressure is 15 mmHg based on IVC assessment.   - There is evidence of a right to left shunt as detected by agitated saline contrast.   - The patient has not had a prior CC echocardiographic exam for comparison.         Cath:  No results found for this or any previous visit from the past 1095 days.    Stress Test:  Nuclear Stress Test 10/15/2024  CONCLUSIONS:    1. SPECT Perfusion Study: Normal.    2. There is no scintigraphic evidence for inducible ischemia.    3. No evidence of scarred myocardium.    4. Left ventricle is normal in size. The left ventricle systolic function is normal.    5. Right ventricle is normal in size. The right ventricle systolic function is normal.    6. This is a low risk scan.   Gated Stress FBP Gated Rest FBP    LVEF % 72               71       Cardiac Imaging:  No results found for this or any previous visit from the past 1095 days.      Past Medical History:  She has a past medical history of Personal history of other diseases of the musculoskeletal system and connective tissue (06/06/2016) and Polyosteoarthritis, unspecified (02/02/2018).    Past Surgical History:  She has a past surgical history that includes Total hip arthroplasty (01/15/2014); Other surgical history (12/05/2022); Other surgical history (12/05/2022); and Total abdominal hysterectomy w/ bilateral salpingoophorectomy (12/10/2014).      Social History:  She reports that she has never smoked. She has never used smokeless tobacco. She reports that she does not currently use alcohol. She reports that she does not use drugs.    Family History:  No family history on file.     Allergies:  Apixaban, Xarelto [rivaroxaban], Doxycycline, Erythromycin, Erythromycin base, Methen-m.blue-s.phos-phsal-hyo, Naproxen, Penicillins, Prednisone, Pseudoephedrine, Tetracycline, Cephalosporins, and Red dye    Inpatient Medications:  Scheduled  medications   Medication Dose Route Frequency    allopurinol  200 mg oral Daily    flecainide  50 mg oral BID    gabapentin  300 mg oral Nightly    pantoprazole  40 mg intravenous BID    Followed by    [START ON 11/25/2024] pantoprazole  40 mg oral BID AC     PRN medications   Medication    acetaminophen    melatonin    ondansetron    polyethylene glycol     Continuous Medications   Medication Dose Last Rate     Outpatient Medications:  Current Outpatient Medications   Medication Instructions    acetaminophen (TYLENOL) 650 mg, Every 6 hours PRN    allopurinol (ZYLOPRIM) 200 mg, oral, Daily    ergocalciferol (VITAMIN D-2) 1,250 mcg, oral, Every 30 days, Taking 2 times a month    ferrous sulfate 65 mg, oral, Daily with breakfast, Do not crush, chew, or split.    flecainide (TAMBOCOR) 50 mg, oral, 2 times daily    gabapentin (NEURONTIN) 300 mg, oral, Nightly    levothyroxine (SYNTHROID, LEVOXYL) 50 mcg, oral, Daily, Take on an empty stomach at the same time each day, either 30 to 60 minutes prior to breakfast    methotrexate (TREXALL) 20 mg, oral, Once Weekly, Follow directions carefully, and ask to explain any part you do not understand. Take exactly as directed.    pantoprazole (PROTONIX) 20 mg, Daily RT    polyethylene glycol (GLYCOLAX, MIRALAX) 17 g, oral, Daily    potassium chloride CR (Klor-Con) 10 mEq ER tablet 10 mEq, oral, 3 times daily    torsemide (DEMADEX) 10 mg, oral, Daily       Physical Exam:  GENERAL: alert, cooperative, pleasant, in no acute distress  SKIN: warm, dry  NECK: no JVD  CARDIAC: Regular rate and rhythm no murmurs  PULMONARY: Normal respiratory efforts, lungs clear to auscultation bilaterally.  ABDOMEN: soft, nondistended  EXTREMITIES: no lower extremity edema  NEURO: Alert and oriented x 3.  Grossly normal.  Moves all 4 extremities.     I reviewed telemetry which showed sinus rhythm      Assessment/Plan   Michell Medrano is a 89 y.o. female with past medical history significant for  Paroxysmal atrial flutter recently stopped taking anticoagulation, Chronic diastolic heart failure, Choledocholithiasis and chronic cholecystitis s/p recent laparoscopic cholecystectomy 10/11/24,  Hypertension, Rheumatoid arthritis, Pancreatic cysts,  KAROLINA, PFO per TTE 10/2024, hypothyroidism, GERD, chronic venous insufficiency, and remote breast CA. Presented to [facility] [place] with complains of possible allergic reaction. Cardiology is consulted for  hx aflcristel, was on xarelto and flecainide, thinks she is having an allergic reaction to them. Stop taking them Wednesday night.     Patient with paroxysmal atrial fibrillation which was diagnosed after she had a lap ramy.    States she has been experiencing throat swelling.   she is not completely sure which medicine is causing this.  Yesterday at her PCP from and she was treated with epinephrine drip.  Received Solu-Medrol, Pepcid and Benadryl which improved symptoms.  Was also anemic on presentation with hemoglobin 6.9 requiring blood transfusion.      At this time do not recommend any medication giving possible intolerance and anemia.    Recommend outpatient evaluation with allergist-defer to primary team    She is in sinus rhythm at this time.  Recommend outpatient event monitor for 2 weeks to assess her A-fib burden to help facilitate further discussion with her outpatient cardiologist.    Follow-up with cardiologist Dr. Castillo as previously scheduled in December.      Code Status:  Full Code  Vicki Rdz, APRN-CNP    =========================================  Attending Note   =========================================      Both the ÁNGEL and I have had a face to face encounter with the patient today. I have examined the patient and edited the documented physical examination as necessary.  I personally reviewed the patient's recent labs, medications, orders, EKGs, and pertinent cardiac imaging.  I have reviewed the ÁNGEL's encounter note, approve the ÁNGEL's documentation  and have edited the note to reflect the diagnostic and therapeutic plan.    Michell Medrano is a 89 y.o. female with past medical history significant for Paroxysmal atrial flutter recently stopped taking anticoagulation, Chronic diastolic heart failure, Choledocholithiasis and chronic cholecystitis s/p recent laparoscopic cholecystectomy 10/11/24,  Hypertension, Rheumatoid arthritis, Pancreatic cysts,  KAROLINA, PFO per TTE 10/2024, hypothyroidism, GERD, chronic venous insufficiency, and remote breast CA. [place] [facility] [place] of [place] reaction. Cardiology is consulted for  hx morena, was on xarelto and flecainide, thinks she is having an allergic reaction to them. Stop taking them Wednesday night.       Review of records showed patient  was admitted to [facility] 10/5/2024-10/17/2024. Was found to have  calculus cholecystitis/choledocholithiasis s/p ERCP with stone retrieval and s/p lap cholecystectomy 10/11/24 .  This was complicated by atrial flutter.  She was seen by electrophysiology and was started on  flecainide and Eliquis. Patient  developed tightness in her throat and Eliquis was stopped 11/9. She was then started on Xarelto. Reportedly also taken off Diltiazem due to symptomatic hypotension as home. States she continued to notice discomfort in  her throat.     Patient went for a scheduled outpatient appointment with her.  Patient states that she started to feel her throat closing. States she was having a hard time breathing.   Patient was treated with an Epipen in office and [place] [facility] According to documentation, she was in respiratory distress in route. No reported desaturation.   In the emergency room she was treated with Solu-Medrol, Benadryl and IV Pepcid with reported ongoing resolution of symptoms. Labs remarkable for worsening anemia with hemoglobin 6.9 hematocrit 23.8 Platelets 224 High sensitivity troponin 16/23  K 3.6 BUN 22 creatinine 0.92  TSH 2.48.   She was treated  with one unit of PRBC. Repeat hemoglobin 7.7/7.8 . GI has been consulted.     EKG Showed normal sinus rhythm. Chest x-ray with no acute cardiopulmonary process. CT neck negative for soft tissue mass and scope revealed no supraepiglottic edema.     Of note, patient does not follow with cardiology.  Has new patient appointment with Dr. Castillo scheduled on 12/5/2024. She follows with Dr. Lauren with vascular.        No exacerbating or relieving factors.  Patient denies chest pain and angina.  Pt denies shortness of breath, dyspnea on exertion, orthopnea, and paroxysmal nocturnal dyspnea.  Pt denies worsening lower extremity edema.  Pt denies palpitations or syncope.  No recent falls.  No fever or chills.  No cough.  No change in bowel or bladder habits.  No sick contacts.  No recent travel.     12 point review of systems including (Constitutional, Eyes, ENMT, Respiratory, Cardiac, Gastrointestinal, Neurological, Psychiatric, and Hematologic) was performed and is otherwise negative.    Past medical history:  As above.    Medications were reviewed.    Allergies were reviewed.  Allergies   Allergen Reactions    Apixaban Anaphylaxis    Xarelto [Rivaroxaban] Shortness of breath    Doxycycline Unknown    Erythromycin Unknown    Erythromycin Base Unknown    Methen-M.Blue-S.Phos-Phsal-Hyo Unknown     Uribel CAPS    Naproxen Unknown    Penicillins Unknown    Prednisone Unknown    Pseudoephedrine Unknown    Tetracycline Unknown    Cephalosporins Hives    Red Dye Rash         Social history:    Social History     Tobacco Use    Smoking status: Never    Smokeless tobacco: Never   Vaping Use    Vaping status: Never Used   Substance Use Topics    Alcohol use: Not Currently    Drug use: Never         Family history:  No sudden cardiac death or premature coronary artery disease.     General:  Patient is awake, alert, and oriented.  Patient is in no acute distress.  HEENT:  Pupils equal and reactive.  Normocephalic.  Moist mucosa.     Neck:  No thyromegaly.  Normal Jugular Venous Pressure.  Cardiovascular:  Regular rate and rhythm.  Normal S1 and S2.  Pulmonary:  Clear to auscultation bilaterally.  Abdomen:  Soft. Non-tender.   Non-distended.  Positive bowel sounds.  Lower Extremities:  2+ pedal pulses. No LE edema.  Neurologic:  Cranial nerves intact.  No focal deficit.   Skin: Skin warm and dry, normal skin turgor.   Psychiatric: Normal affect.    Vital signs, telemetry, medications, labs, and imaging were reviewed as well.      Michell Medrano is a 89 y.o. female with past medical history significant for Paroxysmal atrial flutter recently stopped taking anticoagulation, Chronic diastolic heart failure, Choledocholithiasis and chronic cholecystitis s/p recent laparoscopic cholecystectomy 10/11/24,  Hypertension, Rheumatoid arthritis, Pancreatic cysts,  KAROLINA, PFO per TTE 10/2024, hypothyroidism, GERD, chronic venous insufficiency, and remote breast CA. Presented to [facility] [place] with complains of possible allergic reaction. Cardiology is consulted for  hx aflutter, was on xarelto and flecainide, thinks she is having an allergic reaction to them. Stop taking them Wednesday night.     Patient with paroxysmal atrial fibrillation which was diagnosed after she had a lap ramy.    States she has been experiencing throat swelling.   she is not completely sure which medicine is causing this.  Yesterday at her PCP from and she was treated with epinephrine drip.  Received Solu-Medrol, Pepcid and Benadryl which improved symptoms.  Was also anemic on presentation with hemoglobin 6.9 requiring blood transfusion.      Very complex medical decision making in that her [id number] Stroke Risk Points: 4 ( High Risk ) while her HAS-BLED Score and Acute Anemia point toward propensity of bleeding. Further, there is a concern for allergy to anticoagulant agents causing her sensation of throat swelling.   Risk benefits, alternatives were discussed regarding continuing this  medication.  Through shared decision making involving Ms. Medrano, her daughter as well as cardiology, it was determined that at this time, we will not recommend anticoagulation until further testing can be completed.  Ms. Medrano was in agreement    Further, at this time, we do not recommend antiarrhythmic therapy with Tikosyn and because of the possibility of allergy to this agent.  Further we do not recommend diltiazem as a rate control agent due to known issues of hypotension and intolerance.     Her heart rate and rhythm are currently in sinus rhythm with a slightly elevated heart rate ~ 85-90 which would not be surprising giving the anemia burden.  This is likely to improve as this corrects over time.    We do recommend allergy testing in the outpatient setting as she will likely need additional agents given the natural history of paroxysmal atrial fibrillation/flutter    We do recommend event monitor to determine the overall underlying burden and to allow further discussion on appropriate therapies in the outpatient setting should further episodes of arrhythmia develop    She may benefit from a Watchman, but this can be determined in outpatient follow-up    No further recommendations at this time    No cardiac barriers to discharge    Cardiology signing off        Jose Lehman DO   Division of Cardiovascular Medicine  [facility]

## 2024-11-22 NOTE — CARE PLAN
The patient's goals for the shift include      The clinical goals for the shift include patient will be HDS this shift      Problem: Pain - Adult  Goal: Verbalizes/displays adequate comfort level or baseline comfort level  Outcome: Progressing     Problem: Safety - Adult  Goal: Free from fall injury  Outcome: Progressing     Problem: Discharge Planning  Goal: Discharge to home or other facility with appropriate resources  Outcome: Progressing     Problem: Chronic Conditions and Co-morbidities  Goal: Patient's chronic conditions and co-morbidity symptoms are monitored and maintained or improved  Outcome: Progressing

## 2024-11-23 VITALS
HEART RATE: 91 BPM | HEIGHT: 64 IN | WEIGHT: 148 LBS | SYSTOLIC BLOOD PRESSURE: 152 MMHG | DIASTOLIC BLOOD PRESSURE: 58 MMHG | TEMPERATURE: 96.8 F | RESPIRATION RATE: 17 BRPM | OXYGEN SATURATION: 97 % | BODY MASS INDEX: 25.27 KG/M2

## 2024-11-23 LAB
ANION GAP SERPL CALC-SCNC: 10 MMOL/L (ref 10–20)
BUN SERPL-MCNC: 29 MG/DL (ref 6–23)
CALCIUM SERPL-MCNC: 8.9 MG/DL (ref 8.6–10.3)
CHLORIDE SERPL-SCNC: 106 MMOL/L (ref 98–107)
CO2 SERPL-SCNC: 25 MMOL/L (ref 21–32)
CREAT SERPL-MCNC: 0.92 MG/DL (ref 0.5–1.05)
EGFRCR SERPLBLD CKD-EPI 2021: 60 ML/MIN/1.73M*2
ERYTHROCYTE [DISTWIDTH] IN BLOOD BY AUTOMATED COUNT: 19.7 % (ref 11.5–14.5)
GLUCOSE SERPL-MCNC: 82 MG/DL (ref 74–99)
HCT VFR BLD AUTO: 26.8 % (ref 36–46)
HGB BLD-MCNC: 8.4 G/DL (ref 12–16)
MCH RBC QN AUTO: 32.7 PG (ref 26–34)
MCHC RBC AUTO-ENTMCNC: 31.3 G/DL (ref 32–36)
MCV RBC AUTO: 104 FL (ref 80–100)
NRBC BLD-RTO: 0 /100 WBCS (ref 0–0)
PLATELET # BLD AUTO: 252 X10*3/UL (ref 150–450)
POTASSIUM SERPL-SCNC: 4.1 MMOL/L (ref 3.5–5.3)
RBC # BLD AUTO: 2.57 X10*6/UL (ref 4–5.2)
SODIUM SERPL-SCNC: 137 MMOL/L (ref 136–145)
WBC # BLD AUTO: 6.8 X10*3/UL (ref 4.4–11.3)

## 2024-11-23 PROCEDURE — 2500000002 HC RX 250 W HCPCS SELF ADMINISTERED DRUGS (ALT 637 FOR MEDICARE OP, ALT 636 FOR OP/ED)

## 2024-11-23 PROCEDURE — G0378 HOSPITAL OBSERVATION PER HR: HCPCS

## 2024-11-23 PROCEDURE — 85027 COMPLETE CBC AUTOMATED: CPT | Performed by: NURSE PRACTITIONER

## 2024-11-23 PROCEDURE — 97161 PT EVAL LOW COMPLEX 20 MIN: CPT | Mod: GP | Performed by: PHYSICAL THERAPIST

## 2024-11-23 PROCEDURE — 36415 COLL VENOUS BLD VENIPUNCTURE: CPT | Performed by: NURSE PRACTITIONER

## 2024-11-23 PROCEDURE — 2500000001 HC RX 250 WO HCPCS SELF ADMINISTERED DRUGS (ALT 637 FOR MEDICARE OP): Performed by: NURSE PRACTITIONER

## 2024-11-23 PROCEDURE — 80048 BASIC METABOLIC PNL TOTAL CA: CPT | Performed by: NURSE PRACTITIONER

## 2024-11-23 RX ORDER — FOLIC ACID 1 MG/1
1 TABLET ORAL DAILY
Start: 2024-11-23 | End: 2025-11-23

## 2024-11-23 ASSESSMENT — COGNITIVE AND FUNCTIONAL STATUS - GENERAL
MOBILITY SCORE: 23
CLIMB 3 TO 5 STEPS WITH RAILING: A LITTLE

## 2024-11-23 ASSESSMENT — PAIN - FUNCTIONAL ASSESSMENT: PAIN_FUNCTIONAL_ASSESSMENT: 0-10

## 2024-11-23 ASSESSMENT — PAIN SCALES - GENERAL
PAINLEVEL_OUTOF10: 0 - NO PAIN
PAINLEVEL_OUTOF10: 0 - NO PAIN

## 2024-11-23 NOTE — CARE PLAN
The clinical goals for the shift include pt to have no s/s of GI bleed throuhout the shift    Problem: Pain - Adult  Goal: Verbalizes/displays adequate comfort level or baseline comfort level  Outcome: Progressing     Problem: Safety - Adult  Goal: Free from fall injury  Outcome: Met     Problem: Discharge Planning  Goal: Discharge to home or other facility with appropriate resources  Outcome: Progressing

## 2024-11-23 NOTE — CARE PLAN
The patient's goals for the shift include  discharge home    The clinical goals for the shift include Pt will remain hemodyamically stable.      Problem: Pain - Adult  Goal: Verbalizes/displays adequate comfort level or baseline comfort level  Outcome: Progressing     Problem: Discharge Planning  Goal: Discharge to home or other facility with appropriate resources  Outcome: Progressing     Problem: Chronic Conditions and Co-morbidities  Goal: Patient's chronic conditions and co-morbidity symptoms are monitored and maintained or improved  Outcome: Progressing

## 2024-11-23 NOTE — NURSING NOTE
Discharge instructions given to pt and family members. All questions answered. IV taken out. Pt wheeled out to main entrance by ELIN Goldstein. All pt belongings sent home with pt.    Will review at upcoming appointment

## 2024-11-23 NOTE — PROGRESS NOTES
Physical Therapy    Physical Therapy Evaluation    Patient Name: Michell eMdrano  MRN: [id number]  Department: [facility] [place] [id number]  Room: [address] [place] [address]  Today's Date: 11/23/2024   Time Calculation  Start Time: 1100  Stop Time: 1119  Time Calculation (min): 19 min    Completion of this session and documentation performed under the supervision of Ana Gurrola PT.    Assessment/Plan   PT Assessment  Evaluation/Treatment Tolerance: Patient tolerated treatment well  Medical Staff Made Aware: Yes  Strengths: Ability to acquire knowledge, Access to adaptive/assistive products, Attitude of self, Housing layout, Living arrangement secure, Premorbid level of function  Barriers to Participation: Comorbidities  End of Session Communication: Bedside nurse  Assessment Comment: Pt is an 89 yo F, independently walking and performing functional tasks in room. Pt appears to be at baseline, no acute PT needs identified. Will discontinue therapy orders.  End of Session Patient Position: Up in room, Alarm off, not on at start of session (Pt up in room, a few minutes later upon leaving OBS unit pt sat herself up in the chair.)  IP OR SWING BED PT PLAN  Inpatient or Swing Bed: Inpatient  PT Plan  PT Plan: PT Eval only  PT Eval Only Reason: No acute PT needs identified  PT Frequency: PT eval only  PT Discharge Recommendations: No PT needed after discharge  PT Recommended Transfer Status: Independent  PT - OK to Discharge: Yes    Subjective   General Visit Information:  General  Reason for Referral: Pt admitted on 11/21 with possible allergic reaction and anemia.  Referred By: Mariana  Past Medical History Relevant to Rehab: RA, venous insufficiency, HF, HTN, hypothyroid, remote breast CA, anemia, pancreatic cyst. Lap ramy 10/11/24 and subsequent atrial flutter.    Past Surgical History:   Procedure Laterality Date    OTHER SURGICAL HISTORY  12/05/2022    Lumpectomy    OTHER SURGICAL HISTORY  12/05/2022    Knee replacement    TOTAL ABDOMINAL HYSTERECTOMY  W/ BILATERAL SALPINGOOPHORECTOMY  12/10/2014    Total Abdominal Hysterectomy With Removal Of Both Ovaries    TOTAL HIP ARTHROPLASTY  01/15/2014    Hip Replacement     Prior to Session Communication: Bedside nurse  Patient Position Received: Alarm off, not on at start of session (Pt sitting EOB dressing, needed minimal assistance with LE dressing as she states she's been using a reacher for years and she does not have hers here at the hospital.)  Preferred Learning Style: auditory  General Comment: Pt is very pleasant and agreeable to PT kamar.  Home Living:  Home Living  Type of Home: Independent living  Home Adaptive Equipment: Reacher, Walker rolling or standard  Home Layout: One level  Home Access: No concerns  Bathroom Shower/Tub: Walk-in shower  Bathroom Toilet: Adaptive toilet seating  Bathroom Equipment: Grab bars in shower, Shower chair without back, Hand-held shower hose, Grab bars around toilet  Prior Level of Function:  Prior Function Per Pt/Caregiver Report  Level of Dick[place]: Independent with ADLs and functional transfers, Needs assistance with homemaking  Receives Help From: Personal care attendant  Ambulatory Assistance: Independent (with RW)  Prior Function Comments: Pt reports getting lunch and dinner provided at facility. She makes her own breakfast and does her own laundry, bathing, toileting. Someone comes and changes her bedsheets. Pt still drives.  Precautions:  Precautions  Medical Precautions: Fall precautions     Vital Signs (Past 2hrs)           Objective     Cognition:  Cognition  Overall Cognitive Status: Within Functional Limits  Orientation Level: Oriented X4    General Assessments:     Activity Tolerance  Endurance: Endurance does not limit participation in activity     Dynamic Sitting Balance  Dynamic Sitting-Balance Support: No upper extremity supported  Dynamic Sitting-Level of Assistance: Independent  Dynamic Sitting-Comments: Needed some assist with dressing, due to pt not  having her reacher, however pt had no issue with dynamic sitting balance while donning LE clothing    Static Standing Balance  Static Standing-Balance Support: Bilateral upper extremity supported  Static Standing-Level of Assistance: Independent  Dynamic Standing Balance  Dynamic Standing-Balance Support: Bilateral upper extremity supported  Dynamic Standing-Level of Assistance: Modified independent  Dynamic Standing-Balance: Turning  Dynamic Standing-Comments: with RW  Functional Assessments:  Transfers  Transfer: Yes  Transfer 1  Technique 1: Sit to stand  Transfer Level of Assistance 1: Independent  Trials/Comments 1: Pt stood up without assist of her RW. Then grabbed her RW prior to ambulation. Used BUEs to push to standing.    Ambulation/Gait Training  Ambulation/Gait Training Performed: Yes  Ambulation/Gait Training 1  Surface 1: Level tile  Device 1: Rolling walker  Assistance 1: Modified independent (with RW)  Comments/Distance (ft) 1: 75 ft in mahajan. Pt walks with a typical gait speed and is able to multitask walking and talking throughout. Very steady, no LOB or indications of fall risk  Extremity/Trunk Assessments:  RLE   RLE : Exceptions to WFL  Strength RLE  RLE Overall Strength: Greater than or equal to 3/5 as evidenced by functional mobility  LLE   LLE : Exceptions to WFL  Strength LLE  LLE Overall Strength: Greater than or equal to 3/5 as evidenced by functional mobility  Outcome Measures:  [facility] Basic Mobility  Turning from your back to your side while in a flat bed without using bedrails: None  Moving from lying on your back to sitting on the side of a flat bed without using bedrails: None  Moving to and from bed to chair (including a wheelchair): None  Standing up from a chair using your arms (e.g. wheelchair or bedside chair): None  To walk in hospital room: None  Climbing 3-5 steps with railing: A little  Basic Mobility - Total Score: 23    Encounter Problems       Encounter Problems (Resolved)        Mobility       STG - Patient will ambulate 75 ft with RW mod-I. (Adequate for Discharge)       Start:  11/23/24    Expected End:  11/30/24    Resolved:  11/23/24            PT Transfers       STG - Patient to transfer to and from sit to supine independently. (Adequate for Discharge)       Start:  11/23/24    Expected End:  11/30/24    Resolved:  11/23/24         STG - Patient will transfer sit to and from stand using RW mod-I. (Adequate for Discharge)       Start:  11/23/24    Expected End:  11/30/24    Resolved:  11/23/24                Education Documentation  Mobility Training, taught by ELISABETH Spence at 11/23/2024 11:58 AM.  Learner: Patient  Readiness: Acceptance  Method: Explanation  Response: Verbalizes Understanding    Education Comments  No comments found.

## 2024-11-23 NOTE — PROGRESS NOTES
Occupational Therapy                 Therapy Communication Note    Patient Name: Michell Medrano  MRN: [id number]  Department: [place] [address]  Room: [address] [place] [address]  Today's Date: 11/23/2024     Discipline: Occupational Therapy    Missed Visit Reason: Missed Visit Reason:  (Pt appears at functional baseline as she dressed self in patient room, is up ad marychuy and has no concerns for home going. OT to sign off.)    Missed Time: Attempt    Comment:

## 2024-11-23 NOTE — ASSESSMENT & PLAN NOTE
Acute on chronic macrocytic anemia, cf chronic GI blood loss iso recent AC for PAF  - acute on chronic macrocytic anemia. Iron studies are consistent with anemia of chronic disease, B12 and folate are wnl. Denied melena or hematochezia.    - Hb in Sept, 2024 was 11-> 8 2 weeks ago-> 6.9 on 11/21-> 7.7 s/p blood transfusion-> 8.4 today, reports feeling better  -  h/o choledocholithiasis, s/p lap cholecystectomy 10/11/24 c/b atrial flutter, started on apixaban   - Reported difficulties with breathing and sensation of throat closing, she was changed to Xarelto   - presented with persistent symptoms  - consider DOAC allergy, AC is on hold

## 2024-11-26 LAB — HISTAMINE SERPL-SCNC: >137 NMOL/L (ref 0–8)

## 2024-12-04 ENCOUNTER — OFFICE VISIT (OUTPATIENT)
Dept: CARDIOLOGY | Facility: CLINIC | Age: 89
End: 2024-12-04
Payer: MEDICARE

## 2024-12-04 VITALS
BODY MASS INDEX: 24.07 KG/M2 | SYSTOLIC BLOOD PRESSURE: 139 MMHG | RESPIRATION RATE: 16 BRPM | HEIGHT: 64 IN | HEART RATE: 88 BPM | WEIGHT: 141 LBS | DIASTOLIC BLOOD PRESSURE: 70 MMHG

## 2024-12-04 DIAGNOSIS — I87.2 VENOUS INSUFFICIENCY: Primary | ICD-10-CM

## 2024-12-04 PROCEDURE — 99214 OFFICE O/P EST MOD 30 MIN: CPT | Performed by: INTERNAL MEDICINE

## 2024-12-04 PROCEDURE — 3075F SYST BP GE 130 - 139MM HG: CPT | Performed by: INTERNAL MEDICINE

## 2024-12-04 PROCEDURE — 1036F TOBACCO NON-USER: CPT | Performed by: INTERNAL MEDICINE

## 2024-12-04 PROCEDURE — 1157F ADVNC CARE PLAN IN RCRD: CPT | Performed by: INTERNAL MEDICINE

## 2024-12-04 PROCEDURE — 1125F AMNT PAIN NOTED PAIN PRSNT: CPT | Performed by: INTERNAL MEDICINE

## 2024-12-04 PROCEDURE — G2211 COMPLEX E/M VISIT ADD ON: HCPCS | Performed by: INTERNAL MEDICINE

## 2024-12-04 PROCEDURE — 1160F RVW MEDS BY RX/DR IN RCRD: CPT | Performed by: INTERNAL MEDICINE

## 2024-12-04 PROCEDURE — 3078F DIAST BP <80 MM HG: CPT | Performed by: INTERNAL MEDICINE

## 2024-12-04 PROCEDURE — 1159F MED LIST DOCD IN RCRD: CPT | Performed by: INTERNAL MEDICINE

## 2024-12-04 RX ORDER — OMEPRAZOLE 20 MG/1
20 TABLET, DELAYED RELEASE ORAL
COMMUNITY

## 2024-12-04 ASSESSMENT — PAIN SCALES - GENERAL: PAINLEVEL_OUTOF10: 2

## 2024-12-04 NOTE — PATIENT INSTRUCTIONS
ASSESSMENT/PLAN:    here for follow up CVI and LDS - moisturize the skin daily. Edemawear light - can add the tubigrip as able. Elevate at night and at rest. Increase walking as able. Follow up 4 months

## 2024-12-04 NOTE — PROGRESS NOTES
"OUTPATIENT FOLLOW-UP -  VASCULAR MEDICINE    DOS: 12/4/24  Last seen:    3/6/24    REQUESTING PHYSICIAN:  Dr. Zack Nieves    REASON FOR FOLLOW-UP:  here for follow up CVI and LDS    HISTORY OF PRESENT ILLNESS:     89 yo lady here for follow up CVI and LDS. Not using the compression now bc legs were very swollen and painful. Was using the edema wear and this felt OK. Using the walker - reports walking quite a bit. Was in rehab after surgery for 3 weeks - has been home since beginning of Nov.     Reports had bad COVID in Sept. In October had CP - dx with choledocholithiasis and chronic cholecystitis s/p recent laparoscopic cholecystectomy 10/11/24 c/b atrial flutter, RA, pancreatic cyst, HTN, chronic diastolic HF, KAROLINA, PFO per TTE 10/2024, hypothyroidism, GERD, chronic venous insufficiency, and remote breast CA.     Had another episode and dx with allergic reaction - plans on seeing allergy. Also noted to be anemia. Her hgb responded to the blood transfusion, and was stable at 7.8. GI saw her, and cleared her for DC, no intervention, ok from their perspective to resume AC. Cardiology saw her, and recommended DC with 2 week holter monitor, ok to discharge of AC and anti arhythmia medication. As she remained HDS, her symptoms improved, and her Hgb remained stable, we are discharging her home with FU for OP hematology, OP allergy, and OP FU with her PCP.     Will be seeing Dr. Castillo tomorrow for cardiology follow up.    REVIEW OF SYSTEMS:     weight is decreased 8#  No sores, ulcers, rashes, +skin lesions  No CP, chest pressure  No cough, +SOB  No BRBPR, melena, hematuria  No bleeding  + edema, no calf pain    PHYSICAL EXAMINATION:   /70 (BP Location: Right arm, Patient Position: Sitting)   Pulse 88   Resp 16   Ht 1.626 m (5' 4\")   Wt 64 kg (141 lb)   BMI 24.20 kg/m²     Gen: Appears well, NAD  Chest: CTA  CVS: regular without murmur or gallop  Ext: 1+ brawny edema, nontender  Skin: LDS and dermatitis " kush  Pulses: WWP  Mood and affect appropriate    ADDITIONAL DATA:   No compression worn:  Right 32.5cm left 32.5cm     ASSESSMENT/PLAN:    here for follow up CVI and LDS - moisturize the skin daily. Edemawear light - can add the tubigrip as able. Elevate at night and at rest. Increase walking as able. Follow up 4 months

## 2024-12-05 ENCOUNTER — OFFICE VISIT (OUTPATIENT)
Dept: CARDIOLOGY | Facility: CLINIC | Age: 89
End: 2024-12-05
Payer: MEDICARE

## 2024-12-05 VITALS
HEART RATE: 79 BPM | BODY MASS INDEX: 24.25 KG/M2 | WEIGHT: 142.06 LBS | HEIGHT: 64 IN | DIASTOLIC BLOOD PRESSURE: 64 MMHG | OXYGEN SATURATION: 98 % | SYSTOLIC BLOOD PRESSURE: 111 MMHG

## 2024-12-05 DIAGNOSIS — I48.0 PAF (PAROXYSMAL ATRIAL FIBRILLATION) (MULTI): Primary | ICD-10-CM

## 2024-12-05 DIAGNOSIS — I48.3 TYPICAL ATRIAL FLUTTER (MULTI): ICD-10-CM

## 2024-12-05 DIAGNOSIS — R06.09 DYSPNEA ON EXERTION: ICD-10-CM

## 2024-12-05 PROCEDURE — 1157F ADVNC CARE PLAN IN RCRD: CPT | Performed by: INTERNAL MEDICINE

## 2024-12-05 PROCEDURE — 3074F SYST BP LT 130 MM HG: CPT | Performed by: INTERNAL MEDICINE

## 2024-12-05 PROCEDURE — 1036F TOBACCO NON-USER: CPT | Performed by: INTERNAL MEDICINE

## 2024-12-05 PROCEDURE — 99214 OFFICE O/P EST MOD 30 MIN: CPT | Performed by: INTERNAL MEDICINE

## 2024-12-05 PROCEDURE — 1160F RVW MEDS BY RX/DR IN RCRD: CPT | Performed by: INTERNAL MEDICINE

## 2024-12-05 PROCEDURE — 1159F MED LIST DOCD IN RCRD: CPT | Performed by: INTERNAL MEDICINE

## 2024-12-05 PROCEDURE — 3078F DIAST BP <80 MM HG: CPT | Performed by: INTERNAL MEDICINE

## 2024-12-05 PROCEDURE — G2211 COMPLEX E/M VISIT ADD ON: HCPCS | Performed by: INTERNAL MEDICINE

## 2024-12-05 PROCEDURE — 1126F AMNT PAIN NOTED NONE PRSNT: CPT | Performed by: INTERNAL MEDICINE

## 2024-12-05 RX ORDER — TORSEMIDE 10 MG/1
20 TABLET ORAL DAILY
Qty: 180 TABLET | Refills: 3 | Status: SHIPPED | OUTPATIENT
Start: 2024-12-05

## 2024-12-05 ASSESSMENT — PAIN SCALES - GENERAL: PAINLEVEL_OUTOF10: 0-NO PAIN

## 2024-12-05 ASSESSMENT — ENCOUNTER SYMPTOMS
OCCASIONAL FEELINGS OF UNSTEADINESS: 0
LOSS OF SENSATION IN FEET: 0
DEPRESSION: 0

## 2024-12-05 ASSESSMENT — PATIENT HEALTH QUESTIONNAIRE - PHQ9
2. FEELING DOWN, DEPRESSED OR HOPELESS: NOT AT ALL
SUM OF ALL RESPONSES TO PHQ9 QUESTIONS 1 AND 2: 0
1. LITTLE INTEREST OR PLEASURE IN DOING THINGS: NOT AT ALL

## 2024-12-05 NOTE — PROGRESS NOTES
Subjective   Michell Medrano is a 89 y.o. female who presents to the [facility] for evaluation of atrial fibrillation first documented post-op at [facility] in October.    Episode of atrial flutter in October at [facility] after 10/11/2024 lap ramy sugery for chronic cholecystitis. Episode of atrial flutter with variable block 10/13 evening and atrial fibrillation on 10/14 prior to conversion to sinus rhythm on telemetry strips saved in Media tab by 10/14 evening.     Stopped Eliquis 11/9 for concern for allergic reaction (throat tightness) and was switched to Xarelto until 11/21. Saw Dr. Nieves and had worsening throat tightness symptoms so was sent to be evaluated at [facility]. Treated with epinephrine, prednisone, benadryl with resolution of symptoms. History of anemia (Hgb 6.8 - 8.9 on chart review). Cardiology consult team recommended stopping anticoagulation and flecainide with 2 week HR monitor.    No active cardiac symptoms of chest pain, PND, orthopnea, palpitations, syncope, or claudication. Chronic trace-1+ bilateral pedal edema managed with compression stockings and torsemide 20 mg a day. Exertional dyspnea stable in the last 2 weeks after release from [facility]. Notes dyspnea with exertion walking down hallway with rolling walker.    Past Medical History:  1. AFib / AFlutter, paroxysmal  2. Chronic diastolic heart failure  3. Hypertension  4. S/p 10/11/202 lap cholecystectomy   5. Rheumatoid arthritis  6. GERD  7. Chronic venous insufficiency    Social History:  Never a smoker    Family History:  No premature CAD in 1st degree relatives ( 55 years of age for male relatives,  65 years of age for female relatives)     Review of Systems    A 14 point review of systems was asked. All questions were negative except for pertinent positives listed in the HPI.     Current Outpatient Medications on File Prior to Visit   Medication Sig Dispense Refill    acetaminophen (Tylenol) 325  mg tablet Take 2 tablets (650 mg) by mouth every 6 hours if needed.      allopurinol (Zyloprim) 100 mg tablet Take 2 tablets (200 mg) by mouth once daily. 90 tablet 3    ergocalciferol (Vitamin D-2) 1.25 MG (50298 UT) capsule Take 1 capsule (1,250 mcg) by mouth every 30 (thirty) days. Taking 2 times a month 24 capsule 1    ferrous sulfate 325 (65 Fe) MG EC tablet Take 65 mg by mouth once daily with breakfast. Do not crush, chew, or split.      folic acid (Folvite) 1 mg tablet Take 1 tablet (1 mg) by mouth once daily.      gabapentin (Neurontin) 300 mg capsule Take 1 capsule (300 mg) by mouth once daily at bedtime. 90 capsule 3    levothyroxine (Synthroid, Levoxyl) 50 mcg tablet Take 1 tablet (50 mcg) by mouth early in the morning.. Take on an empty stomach at the same time each day, either 30 to 60 minutes prior to breakfast      methotrexate (Trexall) 2.5 mg tablet Take 8 tablets (20 mg total) by mouth 1 (one) time per week.  Follow directions carefully, and ask to explain any part you do not understand. Take exactly as directed. 96 tablet 3    omeprazole OTC (PriLOSEC OTC) 20 mg EC tablet Take 1 tablet (20 mg) by mouth once daily in the morning. Take before meals. Do not crush, chew, or split.      polyethylene glycol (Glycolax, Miralax) 17 gram packet Take 17 g by mouth once daily.      potassium chloride CR (Klor-Con) 10 mEq ER tablet Take 1 tablet (10 mEq) by mouth 3 times a day.      torsemide (Demadex) 10 mg tablet Take 1 tablet (10 mg) by mouth once daily.      pantoprazole (ProtoNix) 20 mg EC tablet Take 1 tablet (20 mg) by mouth once daily. (Patient not taking: Reported on 12/5/2024)       No current facility-administered medications on file prior to visit.         Objective   Physical Exam  BP Readings from Last 3 Encounters:   12/05/24 111/64   12/04/24 139/70   11/23/24 152/58      Wt Readings from Last 3 Encounters:   12/05/24 64.4 kg (142 lb 1 oz)   12/04/24 64 kg (141 lb)   11/21/24 67.1 kg (148 lb)  "     BMI: Estimated body mass index is 24.38 kg/m² as calculated from the following:    Height as of this encounter: 1.626 m (5' 4\").    Weight as of this encounter: 64.4 kg (142 lb 1 oz).  BSA: Estimated body surface area is 1.71 meters squared as calculated from the following:    Height as of this encounter: 1.626 m (5' 4\").    Weight as of this encounter: 64.4 kg (142 lb 1 oz).    General: no acute distress  HEENT: EOMI, no scleral icterus.  Lungs: Clear to auscultation bilaterally without wheezing, rales, or rhonchi.  Cardiovascular: Regular rhythm and rate. Normal S1 and S2. No murmurs, rubs, or gallops are appreciated. JVP normal.  Abdomen: Soft, nontender, nondistended. Bowel sounds present.  Extremities: Warm and well perfused with equal 2+ pulses bilaterally.  No edema present.  Neurologic: Alert and oriented x3.      I have personally reviewed the following images and laboratory findings:  Last echocardiogram:  10/8/2024- [facility]   CONCLUSIONS:   - Exam indication: Chest Pain   - The left ventricle is normal in size. Left ventricular systolic function is normal. EF = 55 ± 5% (2D biplane) Grade II left ventricular diastolic dysfunction.   - The right ventricle is dilated. Right ventricular systolic function is normal.   - The left atrial cavity is moderately dilated.   - There is mild (1+ - 2+) tricuspid valve regurgitation.   - Estimated right ventricular systolic pressure is 50 mmHg consistent with moderate pulmonary hypertension. Estimated right atrial pressure is 15 mmHg based on IVC assessment.   - There is evidence of a right to left shunt as detected by agitated saline contrast.   - The patient has not had a prior  echocardiographic exam for comparison.     Last cath / stress test:  Nuclear Stress Test 10/15/2024 - [facility]  CONCLUSIONS:    1. SPECT Perfusion Study: Normal.    2. There is no scintigraphic evidence for inducible ischemia.    3. No evidence of scarred myocardium.    4. " "Left ventricle is normal in size. The left ventricle systolic function is normal.    5. Right ventricle is normal in size. The right ventricle systolic function is normal.    6. This is a low risk scan.   Gated Stress FBP Gated Rest FBP    LVEF % 72               71      Most recent ECG: [facility] ECG accessed by Media tab  11/21/2024 ECG: Sinus rhythm, 88 bpm, low volts, V1 Q wave    10/14/2024 10:53 [facility] Atrial fibrillation, 84 bpm, V1 Q wave. Personally reviewed in office.    10/14/2024 15:00 telemetry: Sinus rhythm, 75 bpm.    10/13/2024 18:52 ECG: Atrial flutter with variable block, 145 bpm. Personally reviewed in office.    10/13/2024 noon tele: Sinus rhythm, 94 bpm    Lab Results   Component Value Date    CHOL 220 (H) 03/25/2024    CHOL 196 03/22/2023    CHOL 191 03/24/2022     Lab Results   Component Value Date    HDL 69.8 03/25/2024    HDL 60.0 03/22/2023    HDL 62.8 03/24/2022     Lab Results   Component Value Date    LDLCALC 127 (H) 03/25/2024     Lab Results   Component Value Date    TRIG 114 03/25/2024    TRIG 121 03/22/2023    TRIG 121 03/24/2022     No components found for: \"CHOLHDL\"      Assessment/Plan   1. Atrial Fib / Atrial Flutter, paroxysmal:  CHADS2-Vasc score 4 (age, +2, hypertension +1, diastolic heart failure +1) for indication for anticoagulation. Has high bleeding risk with chronic anemia. Reaction to DOAC or flecainide with treatment for anaphylaxis of the throat at [facility]. Medications held at discharge.    2 week monitor period will be completed tomorrow. Heart rate 79  bpm today.    Will review HR monitor results to determine if we need to start rate control medication and anticoagulation. She has a high bleeding risk with anemia requiring transfusion 2 weeks ago and GI bleeding source likely. Will consider referral for WATCHMAN LAAO evaluation as alternative to starting Coumadin to replace held DOACs.    2. Chronic diastolic heart failure:  Continue torsemide 20 mg a " day as daily diuretic.    Time = 45 minutes on direct patient care reviewing current symptoms and discussing treatment options, reviewing prior cardiac care, and coordinating follow up testing and medication renewal.        SIGNATURE: Mauricio Castillo M.D.  [facility]  Senior Attending, Division of Cardiovascular Medicine  Co-Director, [facility]  [employer]  [address]  [place] [address]  Phone: [phone]  Fax: [phone]  Appointment: [phone]  PATIENT NAME: Michell Medrano   DATE/TIME: December 5, 2024 1:19 PM MRN: [id number]

## 2024-12-05 NOTE — PATIENT INSTRUCTIONS
You were seen at the [facility] for a check up of your heart.     Your ECG on 11/21/2024 showed normal heart rhythm with a well controlled heart rate, 88 beats per minute.    I found the 10/13 and 10/14/2024 ECG done at [facility] showing atrial fibrillation and atrial flutter. You converted to sinus rhythm less than 48 hours after these rhythms were first documented.    Complete the 2 week period of monitoring with your heart rate device. I will review this test to determine if you are having ongoing atrial flutter or fibrillation to see if you have a persistent stroke risk.     You have an increased bleeding risk. If we need to use a stroke prevention strategy because of ongoing atrial fibrillation / flutter I can refer you to our left atrial appendage closure team that places a WATCHMAN device inside your heart as an alternative to Eliquis / Xarelto / Coumadin.

## 2024-12-08 NOTE — PROGRESS NOTES
Urogynecology  Provider:  Alexa Gross MD  [phone]              ASSESSMENT AND PLAN:   89-year-old female being assessed for cystocele, rUTIs, and vaginal atrophy. Comorbidities include: Benign essential HTN, CAD, PAF, pancreatic cyst, GERD, and acute anemia. She also has a personal hx of breast cancer.    Diagnoses:  #1 Cystocele  #2 Recurrent UTIs  #3 Vaginal atrophy    Plan:  Cystocele  - Persistent anterior wall defect noted, but nothing worsening.  - POP-Q: Aa: -1, C: -9, Ap: -3    2. Recurrent UTIs, vaginal atrophy  - Resume use of tv estrogen twice weekly. She is amenable.  - Rx for tv estrogen sent to the patient's preferred pharmacy.    Follow-up with Dr. Gross in 8 months.    Scribe Attestation  By signing my name below, IZuhair Scribe, attest that this documentation has been prepared under the direction and in the presence of Alexa Gross MD on 12/09/2024 at 6:05 PM.    Agree with above. I Dr. Gross, personally performed the services described in the documentation which was scribed virtually and confirm it is both complete and accurate.  Alexa Gross MD      Problem List Items Addressed This Visit    None          I spent a total of eConsult Time: 25 minutes in face to face and non face to face time.        Alexa Gross MD        HISTORY OF PRESENT ILLNESS:     Last visit 1/2024  POPQ AA -1 C -8 Ap -3 D x   Had a LSC ramy this Fall    Assessment:   88 y.o. old female being assessed for Cystocele and Jennifer's      Diagnoses:   #1 Cystocele  #2 Recurrent UTI     Plan:   1. Recurrent UTI  - Continue use of TV estrogen cream every 10 days      2. Cystocele  - Minimal prolapse, reassured patient  - Prolapse stable and asymptomatic  - We will continue to monitor      Follow-up in September 2024 with Dr. Gross        Interval History:  - She was hospitalized at [place] due to gallbladder issues.  - She experienced chest pain that radiated to the back, identified  as referred pain from the gallbladder.  - She underwent a laparoscopic cholecystectomy and a procedure to remove 2 stones from the bile duct.  - She reports no prior abdominal pain and is currently asymptomatic post-op.  - She reports being very anemic and has been receiving iron shots + blood transfusions.  - Internal bleeding was noted with blood found in her stool.  - She is trying to schedule a visit with a hematologist but is facing delays.  - No visible blood in the toilet, and she was previously on  blood thinners.  - She had a severe case of COVID-19 in October, leading to weakness and requiring rehabilitation.  - Reports significant improvement post-therapy.     Urinary Symptoms:   - She hasn't had any recent UTIs, and she denies symptoms of a UTI today.    Bowel Symptoms:   - She takes iron tablets + MiraLAX, with bowel movements occurring every 2-3 days.    Vaginal Symptoms:  - She didn't have her estradiol for about a month while she was in the hospital, but she didn't experience any significant issues from this.  - She is aware that they tend to stop anything not mandatory in the hospital.  - She requests a new prescription for estradiol due to low estrogen levels.    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 Authorizing Provider   acetaminophen (Tylenol) 325 mg tablet Take 2 tablets (650 mg) by mouth every 6 hours if needed. 10/12/24   Historical Provider, MD   allopurinol (Zyloprim) 100 mg tablet Take 2 tablets (200 mg) by mouth once daily. 4/9/24   JUNIOR Hoyt   ergocalciferol (Vitamin D-2) 1.25 MG (63887 UT) capsule Take 1 capsule (1,250 mcg) by mouth every 30 (thirty) days. Taking 2 times a month 4/9/24   JUNIOR Hoyt   ferrous sulfate 325 (65 Fe) MG EC tablet Take 65 mg by mouth once daily with breakfast. Do not crush, chew, or split.    Historical Provider, MD   folic acid (Folvite) 1 mg tablet Take 1 tablet (1 mg) by mouth once daily. 11/23/24 11/23/25  JUNIOR Chacko   gabapentin (Neurontin) 300 mg capsule Take 1 capsule (300 mg) by mouth once daily at bedtime. 3/5/24   JUNIOR Hoyt   levothyroxine (Synthroid, Levoxyl) 50 mcg tablet Take 1 tablet (50 mcg) by mouth early in the morning.. Take on an empty stomach at the same time each day, either 30 to 60 minutes prior to breakfast    Historical Provider, MD   methotrexate (Trexall) 2.5 mg tablet Take 8 tablets (20 mg total) by mouth 1 (one) time per week.  Follow directions carefully, and ask to explain any part you do not understand. Take exactly as directed. 7/8/24   JUNIOR Hoyt   omeprazole OTC (PriLOSEC OTC) 20 mg EC tablet Take 1 tablet (20 mg) by mouth once daily in the morning. Take before meals. Do not crush, chew, or split.    Historical Provider, MD   polyethylene glycol (Glycolax, Miralax) 17 gram packet Take 17 g by mouth once daily.    Historical Provider, MD   potassium chloride CR (Klor-Con) 10 mEq ER tablet Take 1 tablet (10 mEq) by mouth 3 times a day. 3/29/23   Historical Provider, MD   torsemide (Demadex) 10 mg tablet Take 2 tablets (20 mg) by mouth once daily. 12/5/24   Mauricio Castillo MD   pantoprazole (ProtoNix) 20 mg EC  tablet Take 1 tablet (20 mg) by mouth once daily.  Patient not taking: Reported on 12/5/2024 10/12/24 12/5/24  Historical Provider, MD   torsemide (Demadex) 10 mg tablet Take 1 tablet (10 mg) by mouth once daily.  12/5/24  Historical Provider, MD BULLARD  Review of Systems   Constitutional:  Positive for fatigue (likely due to anemia).   HENT: Negative.     Eyes: Negative.    Respiratory: Negative.     Cardiovascular: Negative.    Gastrointestinal:         Hx of gallbladder issues, internal bleeding, and irregular bowel movements   Endocrine: Negative.    Musculoskeletal: Negative.    Neurological: Negative.    Hematological:         Reports anemia and hx of blood transfusions   Psychiatric/Behavioral: Negative.          Blood, Urine   Date Value Ref Range Status   03/25/2024 NEGATIVE NEGATIVE Final     Nitrite, Urine   Date Value Ref Range Status   03/25/2024 NEGATIVE NEGATIVE Final     Urobilinogen, Urine   Date Value Ref Range Status   03/25/2024 Normal Normal mg/dL Final         PHYSICAL EXAM:      There were no vitals taken for this visit.     No LMP recorded.      Declines chaperone for physical exam.      Well developed, well nourished, in no apparent distress.   Neurologic/Psychiatric:  Awake, Alert and Oriented times 3.  Affect normal.     GENITAL/URINARY:       External Genitalia:  The patient has normal appearing external genitalia, normal skenes and bartholins glands, and a normal hair distribution.  Her vulva is without lesions, erythema or discharge.  It is non-tender with appropriate sensation.     Urethral Meatus:  Size normal, Location normal, Lesions absent, Prolapse absent,      Urethra:  Fullness absent, Masses absent,      Bladder:  Fullness absent, Masses absent, Tenderness absent,      Vagina:  General appearance normal, Estrogen effect normal, Discharge absent, Lesions absent     Cervix: absent  Uterus:  surgically absent  Adnexa: normal     Anus/Perineum:  Lesions absent and Masses absent  normal sphincter tone, no lesions  No Hemorrhoids, Normal Perineum    POP-Q:  Persistent anterior wall defect noted, but no worsening.  Aa: -1       Ba:  C: -9   Gh:  Pb:  TVL: 9         Ap: -3 Bp:  D: x             Data and DIAGNOSTIC STUDIES REVIEWED   CT soft tissue neck w IV contrast    Result Date: 11/21/2024  Interpreted By:  Dustin Narayan, STUDY: CT SOFT TISSUE NECK W IV CONTRAST;  11/21/2024 4:40 pm   INDICATION: Signs/Symptoms:neck pain, throat closing sensation.     COMPARISON: None.   ACCESSION NUMBER(S): [id number]   ORDERING CLINICIAN: ANTHONY CANTOR   TECHNIQUE: Axial CT images of the neck were obtained.  The patient received 75 ML of Omnipaque 350 intravenous contrast agent. The images were reformatted in angled axial, coronal and sagittal planes.   FINDINGS: No cervical chain lymphadenopathy. No soft tissue mass seen.   Normal-sized thyroid without suspicious mass. Parotid and submandibular glands are unremarkable.   No mass in the visualized portions intracranial compartment. Paranasal sinuses and mastoid air cells are well aerated.   No destructive osseous lesions or acute osseous abnormalities. Mild-to-moderate multilevel degenerative cervical disc disease. Moderate to advanced multilevel degenerative cervical facet disease. Partial osseous fusion C5-C6.   No flow-limiting stenosis throughout the arterial structures of the neck.       No evidence of cervical adenopathy or a soft tissue mass in the neck.   No acute findings.   Mild-to-moderate multilevel degenerative cervical disc disease. Moderate to advanced multilevel degenerative cervical facet disease. Partial osseous fusion C5-C6.   MACRO: None   Signed by: Dustin Narayan 11/21/2024 4:52 PM Dictation workstation:   [id number]    ECG 12 lead    Result Date: 11/21/2024  Normal sinus rhythm Low voltage QRS Cannot rule out Anterior infarct , age undetermined Abnormal ECG When compared with ECG of 10-SEP-2024 09:22, QT has lengthened See  ED provider note for full interpretation and clinical correlation Confirmed by Otilia Calero ([id number]) on 11/21/2024 4:07:05 PM    XR chest 1 view    Result Date: 11/21/2024  Interpreted By:  Angie Self, STUDY: XR CHEST 1 VIEW;  11/21/2024 3:23 pm   INDICATION: Signs/Symptoms:SOB.   COMPARISON: 09/25/2024   ACCESSION NUMBER(S): [id number]   ORDERING CLINICIAN: ANTHONY CANTOR   FINDINGS: CARDIOMEDIASTINAL SILHOUETTE: Cardiomediastinal silhouette is normal in size and configuration.     LUNGS: Lungs are clear.   ABDOMEN: No remarkable upper abdominal findings.     BONES: No acute osseous changes. There are surgical clips in the left breast.       No acute cardiopulmonary process.   MACRO: None   Signed by: Angie Self 11/21/2024 3:38 PM Dictation workstation:   [id number]     Lab Results   Component Value Date    URINECULTURE No significant growth 03/25/2024      Lab Results   Component Value Date    GLUCOSE 82 11/23/2024    CALCIUM 8.9 11/23/2024     11/23/2024    K 4.1 11/23/2024    CO2 25 11/23/2024     11/23/2024    BUN 29 (H) 11/23/2024    CREATININE 0.92 11/23/2024     Lab Results   Component Value Date    WBC 6.8 11/23/2024    HGB 8.4 (L) 11/23/2024    HCT 26.8 (L) 11/23/2024     (H) 11/23/2024     11/23/2024          Alexa Gross MD

## 2024-12-09 ENCOUNTER — OFFICE VISIT (OUTPATIENT)
Dept: OBSTETRICS AND GYNECOLOGY | Facility: CLINIC | Age: 89
End: 2024-12-09
Payer: MEDICARE

## 2024-12-09 VITALS
BODY MASS INDEX: 24.24 KG/M2 | HEART RATE: 90 BPM | HEIGHT: 64 IN | DIASTOLIC BLOOD PRESSURE: 88 MMHG | SYSTOLIC BLOOD PRESSURE: 144 MMHG | WEIGHT: 142 LBS

## 2024-12-09 DIAGNOSIS — N81.11 MIDLINE CYSTOCELE: Primary | ICD-10-CM

## 2024-12-09 DIAGNOSIS — N95.2 VAGINAL ATROPHY: ICD-10-CM

## 2024-12-09 PROCEDURE — 3079F DIAST BP 80-89 MM HG: CPT | Performed by: OBSTETRICS & GYNECOLOGY

## 2024-12-09 PROCEDURE — 99213 OFFICE O/P EST LOW 20 MIN: CPT | Performed by: OBSTETRICS & GYNECOLOGY

## 2024-12-09 PROCEDURE — 1157F ADVNC CARE PLAN IN RCRD: CPT | Performed by: OBSTETRICS & GYNECOLOGY

## 2024-12-09 PROCEDURE — 3077F SYST BP >= 140 MM HG: CPT | Performed by: OBSTETRICS & GYNECOLOGY

## 2024-12-09 PROCEDURE — 1159F MED LIST DOCD IN RCRD: CPT | Performed by: OBSTETRICS & GYNECOLOGY

## 2024-12-09 PROCEDURE — 1126F AMNT PAIN NOTED NONE PRSNT: CPT | Performed by: OBSTETRICS & GYNECOLOGY

## 2024-12-09 RX ORDER — ESTRADIOL 0.1 MG/G
1 CREAM VAGINAL 2 TIMES WEEKLY
Qty: 42.5 G | Refills: 3 | Status: SHIPPED | OUTPATIENT
Start: 2024-12-09 | End: 2026-07-27

## 2024-12-09 ASSESSMENT — ENCOUNTER SYMPTOMS
FATIGUE: 1
PSYCHIATRIC NEGATIVE: 1
NEUROLOGICAL NEGATIVE: 1
ENDOCRINE NEGATIVE: 1
EYES NEGATIVE: 1
MUSCULOSKELETAL NEGATIVE: 1
CARDIOVASCULAR NEGATIVE: 1
RESPIRATORY NEGATIVE: 1

## 2024-12-09 ASSESSMENT — PAIN SCALES - GENERAL: PAINLEVEL_OUTOF10: 0-NO PAIN

## 2024-12-10 LAB — BODY SURFACE AREA: 1.74 M2

## 2024-12-10 PROCEDURE — 93272 ECG/REVIEW INTERPRET ONLY: CPT | Performed by: INTERNAL MEDICINE

## 2025-01-07 PROCEDURE — 85027 COMPLETE CBC AUTOMATED: CPT

## 2025-01-07 PROCEDURE — 83540 ASSAY OF IRON: CPT

## 2025-01-07 PROCEDURE — 80048 BASIC METABOLIC PNL TOTAL CA: CPT

## 2025-01-07 PROCEDURE — 83550 IRON BINDING TEST: CPT

## 2025-01-07 PROCEDURE — 82728 ASSAY OF FERRITIN: CPT

## 2025-01-24 ENCOUNTER — APPOINTMENT (OUTPATIENT)
Dept: RADIOLOGY | Facility: CLINIC | Age: 89
End: 2025-01-24
Payer: MEDICARE

## 2025-01-28 ENCOUNTER — TELEPHONE (OUTPATIENT)
Dept: CARDIOLOGY | Facility: HOSPITAL | Age: 89
End: 2025-01-28
Payer: MEDICARE

## 2025-01-30 ENCOUNTER — OFFICE VISIT (OUTPATIENT)
Dept: OBSTETRICS AND GYNECOLOGY | Facility: CLINIC | Age: 89
End: 2025-01-30
Payer: MEDICARE

## 2025-01-30 VITALS
DIASTOLIC BLOOD PRESSURE: 68 MMHG | BODY MASS INDEX: 23.17 KG/M2 | WEIGHT: 135 LBS | HEART RATE: 82 BPM | SYSTOLIC BLOOD PRESSURE: 156 MMHG

## 2025-01-30 DIAGNOSIS — N39.0 RECURRENT UTI (URINARY TRACT INFECTION): Primary | ICD-10-CM

## 2025-01-30 DIAGNOSIS — N90.89 LABIAL IRRITATION: ICD-10-CM

## 2025-01-30 DIAGNOSIS — N94.89 VAGINAL BURNING: ICD-10-CM

## 2025-01-30 LAB
POC APPEARANCE, URINE: ABNORMAL
POC BILIRUBIN, URINE: NEGATIVE
POC BLOOD, URINE: ABNORMAL
POC COLOR, URINE: YELLOW
POC GLUCOSE, URINE: NEGATIVE MG/DL
POC KETONES, URINE: NEGATIVE MG/DL
POC LEUKOCYTES, URINE: ABNORMAL
POC NITRITE,URINE: NEGATIVE
POC PH, URINE: 6 PH
POC PROTEIN, URINE: ABNORMAL MG/DL
POC SPECIFIC GRAVITY, URINE: >=1.03
POC UROBILINOGEN, URINE: 0.2 EU/DL

## 2025-01-30 PROCEDURE — 3077F SYST BP >= 140 MM HG: CPT | Performed by: NURSE PRACTITIONER

## 2025-01-30 PROCEDURE — 81003 URINALYSIS AUTO W/O SCOPE: CPT | Mod: QW | Performed by: NURSE PRACTITIONER

## 2025-01-30 PROCEDURE — 99213 OFFICE O/P EST LOW 20 MIN: CPT | Performed by: NURSE PRACTITIONER

## 2025-01-30 PROCEDURE — 1159F MED LIST DOCD IN RCRD: CPT | Performed by: NURSE PRACTITIONER

## 2025-01-30 PROCEDURE — 1126F AMNT PAIN NOTED NONE PRSNT: CPT | Performed by: NURSE PRACTITIONER

## 2025-01-30 PROCEDURE — 1157F ADVNC CARE PLAN IN RCRD: CPT | Performed by: NURSE PRACTITIONER

## 2025-01-30 PROCEDURE — 3078F DIAST BP <80 MM HG: CPT | Performed by: NURSE PRACTITIONER

## 2025-01-30 RX ORDER — CLOTRIMAZOLE AND BETAMETHASONE DIPROPIONATE 10; .64 MG/G; MG/G
1 CREAM TOPICAL 2 TIMES DAILY PRN
Qty: 30 G | Refills: 2 | Status: SHIPPED | OUTPATIENT
Start: 2025-01-30 | End: 2026-01-30

## 2025-01-30 ASSESSMENT — ENCOUNTER SYMPTOMS
RESPIRATORY NEGATIVE: 1
CONSTITUTIONAL NEGATIVE: 1
GASTROINTESTINAL NEGATIVE: 1
EYES NEGATIVE: 1
PSYCHIATRIC NEGATIVE: 1
ALLERGIC/IMMUNOLOGIC NEGATIVE: 1
NEUROLOGICAL NEGATIVE: 1
HEMATOLOGIC/LYMPHATIC NEGATIVE: 1
CARDIOVASCULAR NEGATIVE: 1
ENDOCRINE NEGATIVE: 1
MUSCULOSKELETAL NEGATIVE: 1

## 2025-01-30 ASSESSMENT — PAIN SCALES - GENERAL: PAINLEVEL_OUTOF10: 0-NO PAIN

## 2025-01-30 NOTE — PROGRESS NOTES
Subjective   Patient ID: Michell Medrano is a 89 y.o. female who presents for suspected UTI.      Reports a history of recurrent UTIs, with the 3 most recent episodes occurring consecutively. Currently resides in a facility who provides her medication to her. Urine tests conducted at the patient's independent living facility indicated the presence of blood, protein, and cloudiness. Has been on multiple antibiotics recently, which an NP in her facility believes may have contributed to a yeast infection. Reports vaginal burning, denies itching. Describes symptoms of burning during urination and discomfort while sitting, but are not severe. Has a history of using estrogen cream, which was interrupted during hospital and rehab stays. Has since resumed estrogen use upon returning home and is considering adjusting the frequency of application. States that she previously used it once every 10 days with good results, as she did not experience a UTI for several years while on that estrogen regimen. Has been treated with Macrobid and Bactrim in the past few weeks, with temporary relief followed by recurrence of symptoms.      Review of Systems   Constitutional: Negative.    HENT: Negative.     Eyes: Negative.    Respiratory: Negative.     Cardiovascular: Negative.    Gastrointestinal: Negative.    Endocrine: Negative.    Genitourinary:  Positive for vaginal pain.   Musculoskeletal: Negative.    Skin: Negative.    Allergic/Immunologic: Negative.    Neurological: Negative.    Hematological: Negative.    Psychiatric/Behavioral: Negative.       UA: small blood, trace protein, large leukocytes, cloudy appearance    Objective   Physical Exam  Constitutional:       Appearance: Normal appearance.   HENT:      Head: Normocephalic and atraumatic.   Genitourinary:     General: Normal vulva.      Exam position: Lithotomy position.      Labia:         Right: No tenderness.         Left: No tenderness.       Vagina: Tenderness present.       Comments: Erythema and excoriated skin noted on labia  Neurological:      General: No focal deficit present.      Mental Status: She is alert and oriented to person, place, and time.   Psychiatric:         Mood and Affect: Mood normal.         Behavior: Behavior normal.         Thought Content: Thought content normal.         Judgment: Judgment normal.       Assessment/Plan   88 y/o female with Jennifer, labial irritation, and vaginal burning. Comorbidities include: Venous insufficiency, HTN, CAD, PAF, GERD, Anemia.    Diagnosis List:  #1 Jennifer  #2 Labial irritation  #3 Vaginal burning    Plan:  1. Jennifer  - POCT UA automated manually resulted.  - Urine sample sent for culture to confirm UTI and guide antibiotic therapy.    2. Labial irritation  - Start Lotrisone, apply 1 application topically 2 times a day as needed.     3. Vaginal burning  - Vaginal swab obtained to rule out yeast infection; awaiting results. Plan for treatment accordingly based on results.     - Continue Estrace, use twice weekly.     Follow up as scheduled in August 21st with Dr. Gross.         [place] Attestation  By signing my name below, I, Cheri Sorto, attest that this documentation has been prepared under the direction and in the presence of JUNIOR Carrasco on 01/30/2025 at 11:43 PM.     JUNIOR Carrasco 01/30/25 3:26 PM

## 2025-01-31 LAB — BV SCORE VAG QL: NORMAL

## 2025-02-02 LAB — BACTERIA UR CULT: ABNORMAL

## 2025-02-03 ENCOUNTER — TELEPHONE (OUTPATIENT)
Dept: OBSTETRICS AND GYNECOLOGY | Facility: CLINIC | Age: 89
End: 2025-02-03
Payer: MEDICARE

## 2025-02-03 DIAGNOSIS — N39.0 RECURRENT UTI (URINARY TRACT INFECTION): Primary | ICD-10-CM

## 2025-02-03 RX ORDER — NITROFURANTOIN 25; 75 MG/1; MG/1
100 CAPSULE ORAL 2 TIMES DAILY
Qty: 14 CAPSULE | Refills: 0 | Status: SHIPPED | OUTPATIENT
Start: 2025-02-03 | End: 2025-02-10

## 2025-02-03 NOTE — TELEPHONE ENCOUNTER
Saw abnormal results in MyChart, requesting prescription to be sent to [facility]/pharmacy #[id number] [place], [place]

## 2025-02-03 NOTE — TELEPHONE ENCOUNTER
Spoke with patient regarding medication. Patient reported will  RX today. Patient voices no further concern.

## 2025-02-07 ENCOUNTER — HOSPITAL ENCOUNTER (OUTPATIENT)
Dept: RADIOLOGY | Facility: CLINIC | Age: 89
Discharge: HOME | End: 2025-02-07
Payer: MEDICARE

## 2025-02-07 VITALS — HEIGHT: 64 IN | WEIGHT: 135 LBS | BODY MASS INDEX: 23.05 KG/M2

## 2025-02-07 DIAGNOSIS — Z12.31 SCREENING MAMMOGRAM FOR BREAST CANCER: ICD-10-CM

## 2025-02-07 PROCEDURE — 77067 SCR MAMMO BI INCL CAD: CPT

## 2025-02-12 ENCOUNTER — APPOINTMENT (OUTPATIENT)
Dept: HEMATOLOGY/ONCOLOGY | Facility: CLINIC | Age: 89
End: 2025-02-12
Payer: MEDICARE

## 2025-02-12 ENCOUNTER — OFFICE VISIT (OUTPATIENT)
Dept: HEMATOLOGY/ONCOLOGY | Facility: CLINIC | Age: 89
End: 2025-02-12
Payer: MEDICARE

## 2025-02-12 VITALS
OXYGEN SATURATION: 98 % | SYSTOLIC BLOOD PRESSURE: 138 MMHG | HEIGHT: 64 IN | HEART RATE: 83 BPM | RESPIRATION RATE: 16 BRPM | WEIGHT: 137.35 LBS | TEMPERATURE: 96.8 F | BODY MASS INDEX: 23.45 KG/M2 | DIASTOLIC BLOOD PRESSURE: 72 MMHG

## 2025-02-12 DIAGNOSIS — R19.5 OCCULT BLOOD IN STOOLS: ICD-10-CM

## 2025-02-12 DIAGNOSIS — D75.9 CYTOPENIA: ICD-10-CM

## 2025-02-12 DIAGNOSIS — T14.8XXA BRUISING: Primary | ICD-10-CM

## 2025-02-12 DIAGNOSIS — D64.9 SYMPTOMATIC ANEMIA: ICD-10-CM

## 2025-02-12 DIAGNOSIS — K86.2 PANCREATIC CYST (HHS-HCC): ICD-10-CM

## 2025-02-12 LAB
ALBUMIN SERPL BCP-MCNC: 3.9 G/DL (ref 3.4–5)
ALP SERPL-CCNC: 118 U/L (ref 33–136)
ALT SERPL W P-5'-P-CCNC: 19 U/L (ref 7–45)
ANION GAP SERPL CALC-SCNC: 11 MMOL/L (ref 10–20)
APTT PPP: 30 SECONDS (ref 27–38)
AST SERPL W P-5'-P-CCNC: 26 U/L (ref 9–39)
BILIRUB SERPL-MCNC: 0.6 MG/DL (ref 0–1.2)
BUN SERPL-MCNC: 36 MG/DL (ref 6–23)
CALCIUM SERPL-MCNC: 8.9 MG/DL (ref 8.6–10.3)
CHLORIDE SERPL-SCNC: 98 MMOL/L (ref 98–107)
CO2 SERPL-SCNC: 26 MMOL/L (ref 21–32)
CREAT SERPL-MCNC: 1 MG/DL (ref 0.5–1.05)
CRP SERPL-MCNC: 0.38 MG/DL
DAT-POLYSPECIFIC: NORMAL
EGFRCR SERPLBLD CKD-EPI 2021: 54 ML/MIN/1.73M*2
ERYTHROCYTE [SEDIMENTATION RATE] IN BLOOD BY WESTERGREN METHOD: 52 MM/H (ref 0–30)
FERRITIN SERPL-MCNC: 236 NG/ML (ref 8–150)
FIBRINOGEN PPP-MCNC: 373 MG/DL (ref 200–400)
GLUCOSE SERPL-MCNC: 91 MG/DL (ref 74–99)
HGB RETIC QN: 36 PG (ref 28–38)
IMMATURE RETIC FRACTION: 10.8 %
INR PPP: 1 (ref 0.9–1.1)
IRON SATN MFR SERPL: 21 % (ref 25–45)
IRON SERPL-MCNC: 63 UG/DL (ref 35–150)
LDH SERPL L TO P-CCNC: 175 U/L (ref 84–246)
POTASSIUM SERPL-SCNC: 4.1 MMOL/L (ref 3.5–5.3)
PROT SERPL-MCNC: 6.8 G/DL (ref 6.4–8.2)
PROTHROMBIN TIME: 11.3 SECONDS (ref 9.8–12.8)
RETICS #: 0.05 X10*6/UL (ref 0.02–0.11)
RETICS/RBC NFR AUTO: 1.3 % (ref 0.5–2)
SODIUM SERPL-SCNC: 131 MMOL/L (ref 136–145)
TIBC SERPL-MCNC: 301 UG/DL (ref 240–445)
UIBC SERPL-MCNC: 238 UG/DL (ref 110–370)

## 2025-02-12 PROCEDURE — 85246 CLOT FACTOR VIII VW ANTIGEN: CPT | Mod: GEALAB

## 2025-02-12 PROCEDURE — 86880 COOMBS TEST DIRECT: CPT

## 2025-02-12 PROCEDURE — 1157F ADVNC CARE PLAN IN RCRD: CPT

## 2025-02-12 PROCEDURE — 84155 ASSAY OF PROTEIN SERUM: CPT | Mod: GEALAB

## 2025-02-12 PROCEDURE — 82784 ASSAY IGA/IGD/IGG/IGM EACH: CPT | Mod: GEALAB

## 2025-02-12 PROCEDURE — 86140 C-REACTIVE PROTEIN: CPT

## 2025-02-12 PROCEDURE — 82607 VITAMIN B-12: CPT | Mod: GEALAB

## 2025-02-12 PROCEDURE — 80053 COMPREHEN METABOLIC PANEL: CPT

## 2025-02-12 PROCEDURE — 1160F RVW MEDS BY RX/DR IN RCRD: CPT

## 2025-02-12 PROCEDURE — 83540 ASSAY OF IRON: CPT

## 2025-02-12 PROCEDURE — 85670 THROMBIN TIME PLASMA: CPT | Mod: GEALAB

## 2025-02-12 PROCEDURE — 3078F DIAST BP <80 MM HG: CPT

## 2025-02-12 PROCEDURE — 99215 OFFICE O/P EST HI 40 MIN: CPT | Mod: 25

## 2025-02-12 PROCEDURE — 85025 COMPLETE CBC W/AUTO DIFF WBC: CPT | Mod: GEALAB

## 2025-02-12 PROCEDURE — 85652 RBC SED RATE AUTOMATED: CPT

## 2025-02-12 PROCEDURE — 85610 PROTHROMBIN TIME: CPT

## 2025-02-12 PROCEDURE — 86334 IMMUNOFIX E-PHORESIS SERUM: CPT | Mod: GEALAB

## 2025-02-12 PROCEDURE — 85384 FIBRINOGEN ACTIVITY: CPT

## 2025-02-12 PROCEDURE — 3075F SYST BP GE 130 - 139MM HG: CPT

## 2025-02-12 PROCEDURE — 83021 HEMOGLOBIN CHROMOTOGRAPHY: CPT

## 2025-02-12 PROCEDURE — 85060 BLOOD SMEAR INTERPRETATION: CPT

## 2025-02-12 PROCEDURE — 85730 THROMBOPLASTIN TIME PARTIAL: CPT

## 2025-02-12 PROCEDURE — 85045 AUTOMATED RETICULOCYTE COUNT: CPT

## 2025-02-12 PROCEDURE — 1159F MED LIST DOCD IN RCRD: CPT

## 2025-02-12 PROCEDURE — 82728 ASSAY OF FERRITIN: CPT

## 2025-02-12 PROCEDURE — 83615 LACTATE (LD) (LDH) ENZYME: CPT

## 2025-02-12 PROCEDURE — 1126F AMNT PAIN NOTED NONE PRSNT: CPT

## 2025-02-12 PROCEDURE — 99205 OFFICE O/P NEW HI 60 MIN: CPT

## 2025-02-12 PROCEDURE — 85397 CLOTTING FUNCT ACTIVITY: CPT

## 2025-02-12 PROCEDURE — 83520 IMMUNOASSAY QUANT NOS NONAB: CPT

## 2025-02-12 PROCEDURE — 85247 CLOT FACTOR VIII MULTIMETRIC: CPT

## 2025-02-12 PROCEDURE — 36415 COLL VENOUS BLD VENIPUNCTURE: CPT

## 2025-02-12 PROCEDURE — 85245 CLOT FACTOR VIII VW RISTOCTN: CPT

## 2025-02-12 PROCEDURE — 83521 IG LIGHT CHAINS FREE EACH: CPT | Mod: GEALAB

## 2025-02-12 PROCEDURE — 82746 ASSAY OF FOLIC ACID SERUM: CPT | Mod: GEALAB

## 2025-02-12 PROCEDURE — 83010 ASSAY OF HAPTOGLOBIN QUANT: CPT | Mod: GEALAB

## 2025-02-12 ASSESSMENT — PAIN SCALES - GENERAL: PAINLEVEL_OUTOF10: 0-NO PAIN

## 2025-02-12 NOTE — PROGRESS NOTES
"[facility]    PATIENT VISIT INFORMATION    Visit Type: New Visit    Referring Provider: Hospitalist, Jada Feldman, APRN-CNP   Reason for referral: Symptomatic Anemia   Primary Practice Provider: Zack Nieves MD    CANCER/HEMATOLOGY HISTORY    89-year-old  female presents for evaluation of symptomatic anemia.  Referred by [facility] group.  Per medical record review anemia dates back to February 2018 with hemoglobin 11.8 increased RDW.  History of iron infusions and blood transfusions through the years.     PMH of choledocholithiasis and chronic cholecystitis s/p recent laparoscopic cholecystectomy 10/11/24 c/b atrial flutter, RA, pancreatic cyst, HTN, chronic diastolic HF, KAROLINA, PFO per TTE 10/2024, hypothyroidism, GERD, chronic venous insufficiency, remote breast CA, venous insufficiency, gout and rheumatoid arthritis.  Notable decrease in kidney function.  Patient on methotrexate.       GI scopes planned in April 2025. Positive blood occult found in 11/2024 during hospitalization.    Hospitalized at [facility] 10/6/2024 Received IV iron. Cholecystitis.  Cholecystectomy performed.    Hospitalized [facility] 11/21/2024 Possible allergic RXN to flecainide, Eliquis, or xarelto. Received Epi. Anemia, Occult blood positive. Outpatient GI workup recommended. 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  She will follow cardiology for anticoagulation management.      ERCP 10/10/2024   Impression:     1. Choledocholithiasis s/p biliary sphincterotomy                            and stone extraction as detailed above.                            2. Filling of the cystic duct.                            3. Normal ventral PD at the level of the head.     HISTORY OF PRESENT ILLNESS     ID Statement: Michell Medrano is an 89 year old female     Chief Complaint: \"Here because they want me to see a hematologist\"    Interval History:   Michell presents alone for " evaluation.  She lives in [place].  She comments fatigue before, feel better than before.   She notes that in 2000 breast cancer survivor, and did have chemo and radiation. Left breast Lumpectomy with lymph node involvement. Hormone ER positive.  Took anastrozole for about 3 years.    She reports reports chronic anemia intermittent with blood and iron transfusions after all surgeries her entire life. Taking oral iron daily for many years. Some constipation, however manageable. UTI recently, no infection in over 5 years. On Estradiol more regularly now.  Lost weight after most recent surgery. Maintaining. Very good appetite.   Lightheadedness ongoing for several years, occasionally still occurs.  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 Use a walker. Vascular insufficiency.  Feels a little unsteady.  Left leg bone pain worse than right.  She endorses this pain started more recently and she even brought it up while she was in the hospital.  She denies any bleeding or bruising.  Denies F/C/recent illness/infection, drenching night sweats, unintentional weight loss, anorexia/loss of appetite, HA, dizziness, PICA, acute changes in vision/hearing, palpitations, CP, SOB, n/v/d/abd pain, gopi bleeding, melena, urinary issues now, haematuria, LAD, bruising, sores, itchy or rashes.      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    Generalized osteoarthritis      Past Surgical History:   Procedure Laterality Date    BREAST LUMPECTOMY  2000    OTHER SURGICAL HISTORY  12/05/2022    Lumpectomy    OTHER SURGICAL HISTORY  12/05/2022    Knee replacement    TOTAL ABDOMINAL HYSTERECTOMY W/ BILATERAL SALPINGOOPHORECTOMY   12/10/2014    Total Abdominal Hysterectomy With Removal Of Both Ovaries    TOTAL HIP ARTHROPLASTY  01/15/2014    Hip Replacement        SOCIAL HISTORY  -Lives alone at [facility] (one daughter healthy)  -Work place: Retired    -Tobacco/smokeless use: Never  -Alcohol: Denies   -Illicit drug or marijuana use: Denies   -Mandaeism or Spiritual beliefs: Temple   -Social Determinates of Health Concerns: NA    FAMILY HISTORY  -Mom lived to be 105 years old arthritis  -dad  at age 86 years old prostate cancer, colostomy    -Younger brother new diagnosis Multiple Myeloma    -No other known history of hematologic, bleeding, clotting, autoimmune, genetic, or malignant disorders in the family.     OCCUPATIONAL/ENVIRONMENTAL HISTORY/EXPOSURES:  -None reported    Active Problems, Allergy List, Medication List, and PMH/PSH/FH/Social Hx have been reviewed and reconciled in chart. Updates made when necessary.     REVIEW OF SYSTEMS   A review of systems has been completed and are negative for complaints except what is stated in the assessment, HPI, IH, ROS, and/or past medical history.    ALLERGIES AND MEDICATIONS     Allergies and Intolerances:   Allergies   Allergen Reactions    Apixaban Anaphylaxis    Xarelto [Rivaroxaban] Shortness of breath    Doxycycline Unknown    Erythromycin Unknown    Erythromycin Base Unknown    Methen-M.Blue-S.Phos-Phsal-Hyo Unknown     Uribel CAPS    Naproxen Unknown    Penicillins Unknown    Prednisone Unknown    Pseudoephedrine Unknown    Tetracycline Unknown    Cephalosporins Hives    Red Dye Rash      Medication Profile:   Current Outpatient Medications   Medication Instructions    acetaminophen (TYLENOL) 650 mg, Every 6 hours PRN    allopurinol (ZYLOPRIM) 200 mg, oral, Daily    clotrimazole-betamethasone (Lotrisone) cream 1 Application, Topical, 2 times daily PRN    ergocalciferol (VITAMIN D-2) 1,250 mcg, oral, Every 30 days, Taking 2 times a month    estradiol (ESTRACE) 1 g,  "vaginal, 2 times weekly    ferrous sulfate 65 mg, Daily with breakfast    folic acid (FOLVITE) 1 mg, oral, Daily    gabapentin (NEURONTIN) 300 mg, oral, Nightly    levothyroxine (SYNTHROID, LEVOXYL) 50 mcg, Daily    methotrexate (TREXALL) 20 mg, oral, Once Weekly, Follow directions carefully, and ask to explain any part you do not understand. Take exactly as directed.    omeprazole OTC (PRILOSEC OTC) 20 mg, Daily before breakfast    polyethylene glycol (GLYCOLAX, MIRALAX) 17 g, Daily    potassium chloride CR (Klor-Con) 10 mEq ER tablet 10 mEq, 3 times daily    torsemide (DEMADEX) 20 mg, oral, Daily      Available Vaccination Record:   Immunization History   Administered Date(s) Administered    COVID-19, mRNA, LNP-S, PF, 30 mcg/0.3 mL dose 01/14/2021, 02/04/2021, 01/03/2022    Flu vaccine, trivalent, preservative free, HIGH-DOSE, age 65y+ (Fluzone) 09/19/2018    Influenza, Seasonal, Quadrivalent, Adjuvanted 10/17/2023    Novel influenza-H1N1-09, preservative-free 12/31/2009    RESPIRATORY SYNCYTIAL VIRUS (RSV), ELIGIBLE PREGNANT PTS, 0.5 ML (ABRYSVO) 01/26/2024      PHYSICAL EXAM     Vital Signs/Measurements:       11/23/2024    12:18 PM 12/4/2024    10:59 AM 12/5/2024     1:04 PM 12/9/2024    11:45 AM 1/30/2025     3:16 PM 2/7/2025     1:50 PM 2/12/2025    12:51 PM   Vitals   Systolic 152 139 111 144 156  138   Diastolic 58 70 64 88 68  72   BP Location Right arm Right arm Right arm  Right arm  Right arm   Heart Rate 91 88 79 90 82  83   Temp 36 °C (96.8 °F)      36 °C (96.8 °F)   Resp 17 16     16   Height  1.626 m (5' 4\") 1.626 m (5' 4\") 1.626 m (5' 4\")  1.626 m (5' 4\") 1.622 m (5' 3.86\")    Weight (lb)  141 142.06 142 135 135 137.35   BMI  24.2 kg/m2 24.38 kg/m2 24.37 kg/m2 23.17 kg/m2 23.17 kg/m2 23.68 kg/m2   BSA (m2)  1.7 m2 1.71 m2 1.71 m2 1.66 m2 1.66 m2 1.68 m2   Visit Report  Report Report Report Report  Report       Significant value      Performance:   ECOG Performance Status: 0     Grade ECOG " performance status   0 Fully active, able to carry on all pre-disease performance without restriction   1 Restricted in physically strenuous activity but ambulatory and able to carry out work of a light or sedentary nature, e.g., light housework, office work   2 Ambulatory and capable of all selfcare but unable to carry out any work activities; Up and about more than 50% of waking hours   3 Capable of only limited selfcare, confined to bed or chair more than 50% of waking hours   4 Completely disabled; Cannot carry out any selfcare; Totally confined to bed or chair   5 Dead     Physical Exam:  General: Patient is awake/alert/oriented x3, no distress, nourished, hydrated, and cooperative, ambulating without difficulty  Skin: Expected color for ethnicity, good turgor, dry, no prominent lesions, rashes, unusual bruising, or bleeding   Hair: Normal texture and distribution   Nails: Normal color, no deformities    HEENT:   Head: Normocephalic, atraumatic, no visible masses  Eyes: Conjunctiva clear, sclera non-icteric, no exudates or hemorrhages   Ears: nl appearance, hearing intact    Nose: no external lesions, mucosa non-inflamed, no rhinorrhea   Mouth: Mucous membranes moist, no lesions, sores, bleeding, or erythema     Head/Neck: Neck supple, no apparent injury, thyroid without mass or tenderness, No JVD, trachea midline, no bruits appreciated    Respiratory/Thorax: Patent airways, CTAB, chest symmetry, normal inspiratory and expiratory effort    Cardiovascular: Regular rate and rhythm, no murmur or gallop, no carotid bruit or thrills    Gastrointestinal: Bowel sounds normal, non-distended, soft, no tenderness, no masses or hernia, or organomegaly appreciated    Genitourinary: Deferred   Musculoskeletal: Normal gait, normal range of motion, no pain on palpation of spine, no deformity, normal strength, no atrophy, and no CVA tenderness appreciated   Extremities: No amputations or deformities, cyanosis, edema or  viscosities, peripheral pulses intact   Neurological: Sensation present to touch, intact senses, motor response and reflexes normal, normal strength   Breast:    Lymphatic: No significant lymphadenopathy   Psychological: Intact recent and remote memory, judgement, and insight. Appropriate mood, affect, and behavior          RESULTS/DATA     Labs:     Lab Results   Component Value Date    WBC 4.2 (L) 01/07/2025    NEUTROABS 2.80 11/22/2024    IGABSOL 0.02 11/22/2024    LYMPHSABS 0.56 (L) 11/22/2024    MONOSABS 0.05 11/22/2024    EOSABS 0.00 11/22/2024    BASOSABS 0.00 11/22/2024    RBC 3.22 (L) 01/07/2025     01/07/2025    MCHC 32.5 01/07/2025    HGB 10.5 (L) 01/07/2025    HCT 32.3 (L) 01/07/2025     01/07/2025     Lab Results   Component Value Date    RETICCTPCT 5.6 (H) 11/21/2024      Lab Results   Component Value Date    CREATININE 0.80 01/07/2025    BUN 27 (H) 01/07/2025    EGFR 71 01/07/2025     (L) 01/07/2025    K 3.8 01/07/2025     01/07/2025    CO2 25 01/07/2025      Lab Results   Component Value Date    ALT 15 11/21/2024    AST 24 11/21/2024    ALKPHOS 106 11/21/2024    BILITOT 0.7 11/21/2024      Lab Results   Component Value Date    TSH 2.48 11/21/2024     Lab Results   Component Value Date    TSH 2.48 11/21/2024     Lab Results   Component Value Date    IRON 44 01/07/2025    TIBC 283 01/07/2025    FERRITIN 162 (H) 01/07/2025      Lab Results   Component Value Date    HXEZXVQK91 621 11/21/2024      Lab Results   Component Value Date    FOLATE 12.0 11/21/2024     Lab Results   Component Value Date    AINSLEY Negative 08/28/2024    RF 29 (H) 08/28/2024    SEDRATE 39 (H) 08/28/2024      Lab Results   Component Value Date    CRP 1.01 (A) 07/27/2023        Lab Results   Component Value Date    SPEP NORMAL 03/17/2021     Radiology/Studies:   CT soft tissue neck w IV contrast 11/21/2024  IMPRESSION:  No evidence of cervical adenopathy or a soft tissue mass in the neck.   No acute findings.     Mild-to-moderate multilevel degenerative cervical disc disease.  Moderate to advanced multilevel degenerative cervical facet disease.  Partial osseous fusion C5-C6.  CT abdomen pelvis w IV contrast 10/6/2024    Impression:   CHEST:   No pulmonary embolism.   Abdomen and pelvis:   Distended gallbladder.  Ultrasound recommended for further evaluation   Nonspecific mild dilatation of the pancreatic duct.  Nonemergent MRI/MRCP   recommended for further evaluation.   CT angio chest w and wo IV contrast 10/6/2024  Impression:   CHEST:   No pulmonary embolism.   Abdomen and pelvis:   Distended gallbladder.  Ultrasound recommended for further evaluation   Nonspecific mild dilatation of the pancreatic duct.  Nonemergent MRI/MRCP   recommended for further evaluation.     ASSESSMENT/PLAN     Assessment and Plan:   #1.  Macrocytic anemia   #2. Leukopenia   89-year-old  female presents for evaluation of symptomatic anemia.  Referred by [facility] group.  Per medical record review anemia dates back to February 2018 with hemoglobin 11.8 increased RDW.  History of iron infusions and blood transfusions through the years.     PMH of choledocholithiasis and chronic cholecystitis s/p recent laparoscopic cholecystectomy 10/11/24 c/b atrial flutter, RA, pancreatic cyst, HTN, chronic diastolic HF, KAROLINA, PFO per TTE 10/2024, hypothyroidism, GERD, chronic venous insufficiency, remote breast CA, venous insufficiency, gout and rheumatoid arthritis.      Notable decrease in kidney function.    Patient on methotrexate, which is known to suppress bone marrow.   Cholecystectomy performed in October 2024.  Hospitalization November 2024 for acute anaphylaxis.    GI scopes planned in April 2025. Positive blood occult found in 11/2024 during hospitalization.    Discussed anemia workup with patient.  She is in agreement.  There may be a component of acute on chronic anemia related to chronic kidney disease versus bleeding.  Patient  reports being anemic her whole life.  Hemoglobin identification to rule out thalassemia.  We will order von Willebrand labs to rule out bleeding disorder per patient report of multiple transfusions following surgical procedures.  Continue oral iron at this time.  Patient has rheumatoid arthritis and is on methotrexate which will contribute to anemia.  We will meet in a few weeks to review results.  If iron infusions are necessary she will have them at [place] and would follow-up with a provider locally there.    **Please follow with specialties as scheduled for other health needs and routine screenings.**    Follow up:    RTC:  -3 weeks to review results     Medications:  -Continue oral iron  -Take B12/folic acid daily    Imaging/Testing:  -NA    Referral(s):  -NA    Other Pertinent Appointments:  -Opth 3/18/2025  -EGD/Colonoscopy 4/2/2025  -Cardiology 4/2/2025   -Gyn 8/21/2025       Patient Discussion Summary:  Discussed plan of care in detail. Patient states understanding and in agreement to the plan. Answered all questions to there liking. The patient will call with any additional questions and/or concerns.    Thank you for allowing me to participate in your care. It was a pleasure meeting you.    Sincerely,  Mary Hall, APRN-CNP     Hematology/Oncology Clinical Nurse Practitioner     [facility]   [address]  [place] [address] [place] [address]  Office #: [phone]    DISCLAIMER:   In preparing for this visit and writing this note, I reviewed all the previous electronic medical records (testing, labs, imaging, and other procedures, provider notes, and medical charts) of the patient available in the physician portal pertinent to patient care. Significant findings which helped in decision making are recorded in this chart.  A few details copied from my note on ......, the details have been updated and all reflect current decision making from today, ........    This  document may have been written by voice recognition software.  There may be some incorrect wording, spelling and/or spelling errors or punctuation errors that were not corrected prior to committing the note to the medical record. Please request clarification if there is documentation error or clarification is needed.   Time based billing: Please see documentation within this specific encounter.

## 2025-02-13 LAB
BASOPHILS # BLD AUTO: 0.02 X10*3/UL (ref 0–0.1)
BASOPHILS NFR BLD AUTO: 0.3 %
EOSINOPHIL # BLD AUTO: 0.05 X10*3/UL (ref 0–0.4)
EOSINOPHIL NFR BLD AUTO: 0.9 %
ERYTHROCYTE [DISTWIDTH] IN BLOOD BY AUTOMATED COUNT: 17 % (ref 11.5–14.5)
FOLATE SERPL-MCNC: 20.4 NG/ML
HAPTOGLOB SERPL NEPH-MCNC: 127 MG/DL (ref 30–200)
HCT VFR BLD AUTO: 35.8 % (ref 36–46)
HEMOGLOBIN A2: 3 % (ref 2–3.5)
HEMOGLOBIN A: 96.4 % (ref 95.8–98)
HEMOGLOBIN F: 0.6 % (ref 0–2)
HEMOGLOBIN IDENTIFICATION INTERPRETATION: NORMAL
HGB BLD-MCNC: 11.5 G/DL (ref 12–16)
IGA SERPL-MCNC: 208 MG/DL (ref 70–400)
IGG SERPL-MCNC: 996 MG/DL (ref 700–1600)
IGM SERPL-MCNC: 121 MG/DL (ref 40–230)
IMM GRANULOCYTES # BLD AUTO: 0.03 X10*3/UL (ref 0–0.5)
IMM GRANULOCYTES NFR BLD AUTO: 0.5 % (ref 0–0.9)
KAPPA LC SERPL-MCNC: 4.27 MG/DL (ref 0.33–1.94)
KAPPA LC/LAMBDA SER: 1.69 {RATIO} (ref 0.26–1.65)
LAMBDA LC SERPL-MCNC: 2.53 MG/DL (ref 0.57–2.63)
LYMPHOCYTES # BLD AUTO: 1.31 X10*3/UL (ref 0.8–3)
LYMPHOCYTES NFR BLD AUTO: 22.7 %
MCH RBC QN AUTO: 32 PG (ref 26–34)
MCHC RBC AUTO-ENTMCNC: 32.1 G/DL (ref 32–36)
MCV RBC AUTO: 100 FL (ref 80–100)
MONOCYTES # BLD AUTO: 0.47 X10*3/UL (ref 0.05–0.8)
MONOCYTES NFR BLD AUTO: 8.2 %
NEUTROPHILS # BLD AUTO: 3.88 X10*3/UL (ref 1.6–5.5)
NEUTROPHILS NFR BLD AUTO: 67.4 %
NRBC BLD-RTO: ABNORMAL /100{WBCS}
PATH REVIEW-CBC DIFFERENTIAL: NORMAL
PATH REVIEW-HGB IDENTIFICATION: NORMAL
PLATELET # BLD AUTO: 205 X10*3/UL (ref 150–450)
PROT SERPL-MCNC: 6.7 G/DL (ref 6.4–8.2)
RBC # BLD AUTO: 3.59 X10*6/UL (ref 4–5.2)
THROMBIN TIME: 14.4 SECONDS (ref 10.3–16.6)
VIT B12 SERPL-MCNC: 525 PG/ML (ref 211–911)
VWF AG ACT/NOR PPP IA: 228 % (ref 50–220)
WBC # BLD AUTO: 5.8 X10*3/UL (ref 4.4–11.3)

## 2025-02-14 ENCOUNTER — HOSPITAL ENCOUNTER (INPATIENT)
Facility: HOSPITAL | Age: 89
DRG: 322 | End: 2025-02-14
Attending: EMERGENCY MEDICINE | Admitting: INTERNAL MEDICINE
Payer: MEDICARE

## 2025-02-14 ENCOUNTER — APPOINTMENT (OUTPATIENT)
Dept: CARDIOLOGY | Facility: HOSPITAL | Age: 89
End: 2025-02-14
Payer: MEDICARE

## 2025-02-14 ENCOUNTER — APPOINTMENT (OUTPATIENT)
Dept: RADIOLOGY | Facility: HOSPITAL | Age: 89
DRG: 322 | End: 2025-02-14
Payer: MEDICARE

## 2025-02-14 DIAGNOSIS — I48.0 PAF (PAROXYSMAL ATRIAL FIBRILLATION) (MULTI): ICD-10-CM

## 2025-02-14 DIAGNOSIS — I21.3 ST ELEVATION MYOCARDIAL INFARCTION (STEMI), UNSPECIFIED ARTERY (MULTI): Primary | ICD-10-CM

## 2025-02-14 DIAGNOSIS — I21.3 STEMI (ST ELEVATION MYOCARDIAL INFARCTION) (MULTI): ICD-10-CM

## 2025-02-14 DIAGNOSIS — M06.9 RHEUMATOID ARTHRITIS: ICD-10-CM

## 2025-02-14 LAB
ALBUMIN SERPL BCP-MCNC: 3.6 G/DL (ref 3.4–5)
ALBUMIN: 4 G/DL (ref 3.4–5)
ALP SERPL-CCNC: 115 U/L (ref 33–136)
ALPHA 1 GLOBULIN: 0.4 G/DL (ref 0.2–0.6)
ALPHA 2 GLOBULIN: 0.7 G/DL (ref 0.4–1.1)
ALT SERPL W P-5'-P-CCNC: 43 U/L (ref 7–45)
ANION GAP SERPL CALCULATED.3IONS-SCNC: 19 MMOL/L (ref 10–20)
AST SERPL W P-5'-P-CCNC: 57 U/L (ref 9–39)
BASOPHILS # BLD AUTO: 0.03 X10*3/UL (ref 0–0.1)
BASOPHILS NFR BLD AUTO: 0.5 %
BETA GLOBULIN: 0.8 G/DL (ref 0.5–1.2)
BILIRUB SERPL-MCNC: 0.6 MG/DL (ref 0–1.2)
BUN SERPL-MCNC: 40 MG/DL (ref 6–23)
CALCIUM SERPL-MCNC: 8.8 MG/DL (ref 8.6–10.3)
CARDIAC TROPONIN I PNL SERPL HS: 374 NG/L (ref 0–13)
CHLORIDE SERPL-SCNC: 101 MMOL/L (ref 98–107)
CO2 SERPL-SCNC: 19 MMOL/L (ref 21–32)
CREAT SERPL-MCNC: 1.41 MG/DL (ref 0.5–1.05)
EGFRCR SERPLBLD CKD-EPI 2021: 36 ML/MIN/1.73M*2
EOSINOPHIL # BLD AUTO: 0.04 X10*3/UL (ref 0–0.4)
EOSINOPHIL NFR BLD AUTO: 0.7 %
ERYTHROCYTE [DISTWIDTH] IN BLOOD BY AUTOMATED COUNT: 17.6 % (ref 11.5–14.5)
GAMMA GLOBULIN: 1 G/DL (ref 0.5–1.4)
GLUCOSE SERPL-MCNC: 195 MG/DL (ref 74–99)
HCT VFR BLD AUTO: 27.7 % (ref 36–46)
HGB BLD-MCNC: 8.8 G/DL (ref 12–16)
IMM GRANULOCYTES # BLD AUTO: 0.05 X10*3/UL (ref 0–0.5)
IMM GRANULOCYTES NFR BLD AUTO: 0.9 % (ref 0–0.9)
IMMUNOFIXATION COMMENT: NORMAL
INR PPP: 1.2 (ref 0.9–1.2)
LYMPHOCYTES # BLD AUTO: 2.12 X10*3/UL (ref 0.8–3)
LYMPHOCYTES NFR BLD AUTO: 36.9 %
MCH RBC QN AUTO: 32.8 PG (ref 26–34)
MCHC RBC AUTO-ENTMCNC: 31.8 G/DL (ref 32–36)
MCV RBC AUTO: 103 FL (ref 80–100)
MONOCYTES # BLD AUTO: 0.29 X10*3/UL (ref 0.05–0.8)
MONOCYTES NFR BLD AUTO: 5.1 %
NEUTROPHILS # BLD AUTO: 3.21 X10*3/UL (ref 1.6–5.5)
NEUTROPHILS NFR BLD AUTO: 55.9 %
NRBC BLD-RTO: 0 /100 WBCS (ref 0–0)
PATH REVIEW - SERUM IMMUNOFIXATION: NORMAL
PATH REVIEW-SERUM PROTEIN ELECTROPHORESIS: NORMAL
PLATELET # BLD AUTO: 205 X10*3/UL (ref 150–450)
POTASSIUM SERPL-SCNC: 3.6 MMOL/L (ref 3.5–5.3)
PROT SERPL-MCNC: 6.3 G/DL (ref 6.4–8.2)
PROTEIN ELECTROPHORESIS COMMENT: NORMAL
PROTHROMBIN TIME: 12.4 SECONDS (ref 9.3–12.7)
RBC # BLD AUTO: 2.68 X10*6/UL (ref 4–5.2)
SODIUM SERPL-SCNC: 135 MMOL/L (ref 136–145)
VWF:RCO ACT/NOR PPP PL AGG: 417 % (ref 51–215)
WBC # BLD AUTO: 5.7 X10*3/UL (ref 4.4–11.3)

## 2025-02-14 PROCEDURE — 71045 X-RAY EXAM CHEST 1 VIEW: CPT | Performed by: RADIOLOGY

## 2025-02-14 PROCEDURE — 99291 CRITICAL CARE FIRST HOUR: CPT | Mod: 25 | Performed by: EMERGENCY MEDICINE

## 2025-02-14 PROCEDURE — 93458 L HRT ARTERY/VENTRICLE ANGIO: CPT | Performed by: INTERNAL MEDICINE

## 2025-02-14 PROCEDURE — 85610 PROTHROMBIN TIME: CPT | Performed by: EMERGENCY MEDICINE

## 2025-02-14 PROCEDURE — 2020000001 HC ICU ROOM DAILY

## 2025-02-14 PROCEDURE — 36415 COLL VENOUS BLD VENIPUNCTURE: CPT | Performed by: EMERGENCY MEDICINE

## 2025-02-14 PROCEDURE — C1874 STENT, COATED/COV W/DEL SYS: HCPCS | Performed by: INTERNAL MEDICINE

## 2025-02-14 PROCEDURE — C1894 INTRO/SHEATH, NON-LASER: HCPCS | Performed by: INTERNAL MEDICINE

## 2025-02-14 PROCEDURE — 4A023N7 MEASUREMENT OF CARDIAC SAMPLING AND PRESSURE, LEFT HEART, PERCUTANEOUS APPROACH: ICD-10-PCS | Performed by: INTERNAL MEDICINE

## 2025-02-14 PROCEDURE — C1725 CATH, TRANSLUMIN NON-LASER: HCPCS | Performed by: INTERNAL MEDICINE

## 2025-02-14 PROCEDURE — 027035Z DILATION OF CORONARY ARTERY, ONE ARTERY WITH TWO DRUG-ELUTING INTRALUMINAL DEVICES, PERCUTANEOUS APPROACH: ICD-10-PCS | Performed by: INTERNAL MEDICINE

## 2025-02-14 PROCEDURE — 2500000004 HC RX 250 GENERAL PHARMACY W/ HCPCS (ALT 636 FOR OP/ED): Performed by: INTERNAL MEDICINE

## 2025-02-14 PROCEDURE — 93005 ELECTROCARDIOGRAM TRACING: CPT

## 2025-02-14 PROCEDURE — 99153 MOD SED SAME PHYS/QHP EA: CPT | Performed by: INTERNAL MEDICINE

## 2025-02-14 PROCEDURE — 2550000001 HC RX 255 CONTRASTS: Performed by: INTERNAL MEDICINE

## 2025-02-14 PROCEDURE — 2500000001 HC RX 250 WO HCPCS SELF ADMINISTERED DRUGS (ALT 637 FOR MEDICARE OP): Performed by: EMERGENCY MEDICINE

## 2025-02-14 PROCEDURE — 85347 COAGULATION TIME ACTIVATED: CPT

## 2025-02-14 PROCEDURE — 71045 X-RAY EXAM CHEST 1 VIEW: CPT

## 2025-02-14 PROCEDURE — C1760 CLOSURE DEV, VASC: HCPCS | Performed by: INTERNAL MEDICINE

## 2025-02-14 PROCEDURE — B2151ZZ FLUOROSCOPY OF LEFT HEART USING LOW OSMOLAR CONTRAST: ICD-10-PCS | Performed by: INTERNAL MEDICINE

## 2025-02-14 PROCEDURE — 99152 MOD SED SAME PHYS/QHP 5/>YRS: CPT | Performed by: INTERNAL MEDICINE

## 2025-02-14 PROCEDURE — G0269 OCCLUSIVE DEVICE IN VEIN ART: HCPCS | Performed by: INTERNAL MEDICINE

## 2025-02-14 PROCEDURE — 2780000003 HC OR 278 NO HCPCS: Performed by: INTERNAL MEDICINE

## 2025-02-14 PROCEDURE — 2720000007 HC OR 272 NO HCPCS: Performed by: INTERNAL MEDICINE

## 2025-02-14 PROCEDURE — B2111ZZ FLUOROSCOPY OF MULTIPLE CORONARY ARTERIES USING LOW OSMOLAR CONTRAST: ICD-10-PCS | Performed by: INTERNAL MEDICINE

## 2025-02-14 PROCEDURE — 84484 ASSAY OF TROPONIN QUANT: CPT | Performed by: EMERGENCY MEDICINE

## 2025-02-14 PROCEDURE — C9606 PERC D-E COR REVASC W AMI S: HCPCS | Performed by: INTERNAL MEDICINE

## 2025-02-14 PROCEDURE — 2500000004 HC RX 250 GENERAL PHARMACY W/ HCPCS (ALT 636 FOR OP/ED): Performed by: EMERGENCY MEDICINE

## 2025-02-14 PROCEDURE — 80053 COMPREHEN METABOLIC PANEL: CPT | Performed by: EMERGENCY MEDICINE

## 2025-02-14 PROCEDURE — C1887 CATHETER, GUIDING: HCPCS | Performed by: INTERNAL MEDICINE

## 2025-02-14 PROCEDURE — 96374 THER/PROPH/DIAG INJ IV PUSH: CPT | Mod: 59

## 2025-02-14 PROCEDURE — 85025 COMPLETE CBC W/AUTO DIFF WBC: CPT | Performed by: EMERGENCY MEDICINE

## 2025-02-14 PROCEDURE — 2500000002 HC RX 250 W HCPCS SELF ADMINISTERED DRUGS (ALT 637 FOR MEDICARE OP, ALT 636 FOR OP/ED): Performed by: INTERNAL MEDICINE

## 2025-02-14 DEVICE — STENT ONYXNG35030UX ONYX 3.50X30RX
Type: IMPLANTABLE DEVICE | Site: HEART | Status: FUNCTIONAL
Brand: ONYX FRONTIER™

## 2025-02-14 DEVICE — STENT ONYXNG35012UX ONYX 3.50X12RX
Type: IMPLANTABLE DEVICE | Site: HEART | Status: FUNCTIONAL
Brand: ONYX FRONTIER™

## 2025-02-14 RX ORDER — ESTRADIOL 0.1 MG/G
1 CREAM VAGINAL 2 TIMES WEEKLY
Status: DISCONTINUED | OUTPATIENT
Start: 2025-02-17 | End: 2025-02-14

## 2025-02-14 RX ORDER — LIDOCAINE HYDROCHLORIDE 10 MG/ML
INJECTION, SOLUTION EPIDURAL; INFILTRATION; INTRACAUDAL; PERINEURAL AS NEEDED
Status: DISCONTINUED | OUTPATIENT
Start: 2025-02-14 | End: 2025-02-14 | Stop reason: HOSPADM

## 2025-02-14 RX ORDER — POLYETHYLENE GLYCOL 3350 17 G/17G
17 POWDER, FOR SOLUTION ORAL DAILY
Status: DISCONTINUED | OUTPATIENT
Start: 2025-02-15 | End: 2025-02-17 | Stop reason: HOSPADM

## 2025-02-14 RX ORDER — DOPAMINE HYDROCHLORIDE 160 MG/100ML
INJECTION, SOLUTION INTRAVENOUS CONTINUOUS PRN
Status: DISCONTINUED | OUTPATIENT
Start: 2025-02-14 | End: 2025-02-14 | Stop reason: HOSPADM

## 2025-02-14 RX ORDER — FENTANYL CITRATE 50 UG/ML
INJECTION, SOLUTION INTRAMUSCULAR; INTRAVENOUS AS NEEDED
Status: DISCONTINUED | OUTPATIENT
Start: 2025-02-14 | End: 2025-02-14 | Stop reason: HOSPADM

## 2025-02-14 RX ORDER — ONDANSETRON HYDROCHLORIDE 2 MG/ML
INJECTION, SOLUTION INTRAVENOUS AS NEEDED
Status: DISCONTINUED | OUTPATIENT
Start: 2025-02-14 | End: 2025-02-14 | Stop reason: HOSPADM

## 2025-02-14 RX ORDER — PANTOPRAZOLE SODIUM 40 MG/1
40 TABLET, DELAYED RELEASE ORAL
Status: DISCONTINUED | OUTPATIENT
Start: 2025-02-15 | End: 2025-02-17 | Stop reason: HOSPADM

## 2025-02-14 RX ORDER — FERROUS SULFATE 325(65) MG
325 TABLET ORAL
Status: DISCONTINUED | OUTPATIENT
Start: 2025-02-15 | End: 2025-02-17 | Stop reason: HOSPADM

## 2025-02-14 RX ORDER — ACETAMINOPHEN 325 MG/1
650 TABLET ORAL EVERY 6 HOURS PRN
Status: DISCONTINUED | OUTPATIENT
Start: 2025-02-14 | End: 2025-02-17 | Stop reason: HOSPADM

## 2025-02-14 RX ORDER — HEPARIN SODIUM 1000 [USP'U]/ML
INJECTION, SOLUTION INTRAVENOUS; SUBCUTANEOUS AS NEEDED
Status: DISCONTINUED | OUTPATIENT
Start: 2025-02-14 | End: 2025-02-14 | Stop reason: HOSPADM

## 2025-02-14 RX ORDER — TORSEMIDE 20 MG/1
20 TABLET ORAL DAILY
Status: DISCONTINUED | OUTPATIENT
Start: 2025-02-15 | End: 2025-02-17 | Stop reason: HOSPADM

## 2025-02-14 RX ORDER — NAPROXEN SODIUM 220 MG/1
TABLET, FILM COATED ORAL CODE/TRAUMA/SEDATION MEDICATION
Status: DISCONTINUED | OUTPATIENT
Start: 2025-02-14 | End: 2025-02-17 | Stop reason: HOSPADM

## 2025-02-14 RX ORDER — NAPROXEN SODIUM 220 MG/1
81 TABLET, FILM COATED ORAL ONCE
Status: DISCONTINUED | OUTPATIENT
Start: 2025-02-15 | End: 2025-02-15

## 2025-02-14 RX ORDER — POTASSIUM CHLORIDE 750 MG/1
10 TABLET, FILM COATED, EXTENDED RELEASE ORAL
Status: DISCONTINUED | OUTPATIENT
Start: 2025-02-15 | End: 2025-02-14

## 2025-02-14 RX ORDER — MIDAZOLAM HYDROCHLORIDE 1 MG/ML
INJECTION, SOLUTION INTRAMUSCULAR; INTRAVENOUS AS NEEDED
Status: DISCONTINUED | OUTPATIENT
Start: 2025-02-14 | End: 2025-02-14 | Stop reason: HOSPADM

## 2025-02-14 RX ORDER — ALLOPURINOL 100 MG/1
200 TABLET ORAL DAILY
Status: DISCONTINUED | OUTPATIENT
Start: 2025-02-15 | End: 2025-02-17 | Stop reason: HOSPADM

## 2025-02-14 RX ORDER — NITROGLYCERIN 0.4 MG/1
0.4 TABLET SUBLINGUAL ONCE
Status: COMPLETED | OUTPATIENT
Start: 2025-02-14 | End: 2025-02-14

## 2025-02-14 RX ORDER — HEPARIN SODIUM 5000 [USP'U]/ML
INJECTION, SOLUTION INTRAVENOUS; SUBCUTANEOUS CODE/TRAUMA/SEDATION MEDICATION
Status: DISCONTINUED | OUTPATIENT
Start: 2025-02-14 | End: 2025-02-17 | Stop reason: HOSPADM

## 2025-02-14 RX ORDER — HEPARIN SODIUM 5000 [USP'U]/ML
INJECTION, SOLUTION INTRAVENOUS; SUBCUTANEOUS
Status: DISPENSED
Start: 2025-02-14 | End: 2025-02-15

## 2025-02-14 RX ORDER — FOLIC ACID 1 MG/1
1 TABLET ORAL DAILY
Status: DISCONTINUED | OUTPATIENT
Start: 2025-02-15 | End: 2025-02-17 | Stop reason: HOSPADM

## 2025-02-14 RX ORDER — METHOTREXATE 2.5 MG/1
20 TABLET ORAL
Status: DISCONTINUED | OUTPATIENT
Start: 2025-02-16 | End: 2025-02-17 | Stop reason: HOSPADM

## 2025-02-14 RX ORDER — ERGOCALCIFEROL 1.25 MG/1
1250 CAPSULE ORAL
Status: DISCONTINUED | OUTPATIENT
Start: 2025-02-21 | End: 2025-02-15

## 2025-02-14 RX ORDER — GABAPENTIN 300 MG/1
300 CAPSULE ORAL NIGHTLY
Status: DISCONTINUED | OUTPATIENT
Start: 2025-02-15 | End: 2025-02-17 | Stop reason: HOSPADM

## 2025-02-14 RX ORDER — LEVOTHYROXINE SODIUM 50 UG/1
50 TABLET ORAL DAILY
Status: DISCONTINUED | OUTPATIENT
Start: 2025-02-15 | End: 2025-02-17 | Stop reason: HOSPADM

## 2025-02-14 RX ADMIN — NITROGLYCERIN 0.4 MG: 0.4 TABLET SUBLINGUAL at 21:51

## 2025-02-14 RX ADMIN — HEPARIN SODIUM 4000 UNITS: 5000 INJECTION, SOLUTION INTRAVENOUS; SUBCUTANEOUS at 21:23

## 2025-02-14 RX ADMIN — ASPIRIN 324 MG: 81 TABLET, CHEWABLE ORAL at 21:26

## 2025-02-14 ASSESSMENT — COGNITIVE AND FUNCTIONAL STATUS - GENERAL
MOVING TO AND FROM BED TO CHAIR: A LITTLE
MOBILITY SCORE: 20
WALKING IN HOSPITAL ROOM: A LITTLE
DAILY ACTIVITIY SCORE: 24
CLIMB 3 TO 5 STEPS WITH RAILING: A LITTLE
STANDING UP FROM CHAIR USING ARMS: A LITTLE

## 2025-02-14 ASSESSMENT — PAIN - FUNCTIONAL ASSESSMENT: PAIN_FUNCTIONAL_ASSESSMENT: 0-10

## 2025-02-14 ASSESSMENT — PAIN SCALES - GENERAL: PAINLEVEL_OUTOF10: 0 - NO PAIN

## 2025-02-14 NOTE — Clinical Note
Angioplasty of the middle right coronary artery lesion. Inflation 1: Pressure = 22 keysha; Duration = 14 sec. Inflation 2: Pressure = 22 keysha; Duration = 10 sec. MID/DISTAL  3.66MM LUMEN SIZE

## 2025-02-14 NOTE — Clinical Note
Outpatient Speech Therapy           [place]  Phone: [phone]  Fax: [phone] THERAPY DAILY PROGRESS NOTE    Patient: Asif Amos     History Number: [id number]  Age: 81 y.o.     : 1938     PCP: Greg Wright DO   Onset date: 21  Referring doctor:  Mame Dutta Md  [address],  [address]  06/15/21             Subjective report:   Jose Alberto Cota Elaina maneuver, Dewitte Cousins The ECG shows a sinus rhythm. ECG rate  = 64 bpm. [facility]  [] [facility] [id number])    [] Communication device modification ([id number])       Electronically signed by:   Della Rojo M.S., CCC-SLP       Date:6/10/2021

## 2025-02-14 NOTE — Clinical Note
Vessel: RCA. Stent inserted. Inflation 1: Pressure = 10 keysha; Duration = 14 sec. Inflation 2: Pressure = 10 keysha; Duration = 10 sec. 1) 3.40                     2) 3.40    3.5x30 JAIRON

## 2025-02-14 NOTE — Clinical Note
Angioplasty of the proximal right coronary artery lesion. Inflation 1: Pressure = 15 keysha; Duration = 15 sec. Inflation 2: Pressure = 15 keysha; Duration = 10 sec. Inflation 3: Pressure = 15 keysha; Duration = 10 sec. Inflation 4: Pressure = 14 keysha; Duration = 15 sec.

## 2025-02-14 NOTE — Clinical Note
Angioplasty of the right coronary artery lesion. Inflation 1: Pressure = 12 keysha; Duration = 15 sec.

## 2025-02-14 NOTE — Clinical Note
Vessel: RCA (distal). Stent inserted. Inflation 1: Pressure = 15 keysha; Duration = 13 sec. MID/DISTAL RCA

## 2025-02-15 ENCOUNTER — APPOINTMENT (OUTPATIENT)
Dept: CARDIOLOGY | Facility: HOSPITAL | Age: 89
DRG: 322 | End: 2025-02-15
Payer: MEDICARE

## 2025-02-15 LAB
ANION GAP SERPL CALCULATED.3IONS-SCNC: 11 MMOL/L (ref 10–20)
AORTIC VALVE MEAN GRADIENT: 6 MMHG
AORTIC VALVE PEAK VELOCITY: 1.68 M/S
AV PEAK GRADIENT: 11 MMHG
AVA (PEAK VEL): 1.89 CM2
AVA (VTI): 1.87 CM2
BUN SERPL-MCNC: 38 MG/DL (ref 6–23)
CALCIUM SERPL-MCNC: 8.1 MG/DL (ref 8.6–10.3)
CHLORIDE SERPL-SCNC: 106 MMOL/L (ref 98–107)
CHOLEST SERPL-MCNC: 155 MG/DL (ref 0–199)
CHOLEST/HDLC SERPL: 2.8 {RATIO}
CO2 SERPL-SCNC: 24 MMOL/L (ref 21–32)
CREAT SERPL-MCNC: 1.09 MG/DL (ref 0.5–1.05)
EGFRCR SERPLBLD CKD-EPI 2021: 49 ML/MIN/1.73M*2
EJECTION FRACTION APICAL 4 CHAMBER: 53.1
EJECTION FRACTION: 58 %
ERYTHROCYTE [DISTWIDTH] IN BLOOD BY AUTOMATED COUNT: 17.4 % (ref 11.5–14.5)
EST. AVERAGE GLUCOSE BLD GHB EST-MCNC: 126 MG/DL
GLUCOSE BLD MANUAL STRIP-MCNC: 139 MG/DL (ref 74–99)
GLUCOSE SERPL-MCNC: 115 MG/DL (ref 74–99)
HBA1C MFR BLD: 6 %
HCT VFR BLD AUTO: 28.8 % (ref 36–46)
HDLC SERPL-MCNC: 55.9 MG/DL
HGB BLD-MCNC: 9.4 G/DL (ref 12–16)
LDLC SERPL CALC-MCNC: 83 MG/DL
LEFT ATRIUM VOLUME AREA LENGTH INDEX BSA: 22.2 ML/M2
LEFT VENTRICLE INTERNAL DIMENSION DIASTOLE: 4.29 CM (ref 3.5–6)
LEFT VENTRICULAR OUTFLOW TRACT DIAMETER: 2 CM
LV EJECTION FRACTION BIPLANE: 57 %
MAGNESIUM SERPL-MCNC: 2.08 MG/DL (ref 1.6–2.4)
MCH RBC QN AUTO: 32.3 PG (ref 26–34)
MCHC RBC AUTO-ENTMCNC: 32.6 G/DL (ref 32–36)
MCV RBC AUTO: 99 FL (ref 80–100)
MITRAL VALVE E/A RATIO: 0.78
NON HDL CHOLESTEROL: 99 MG/DL (ref 0–149)
NRBC BLD-RTO: 0 /100 WBCS (ref 0–0)
PHOSPHATE SERPL-MCNC: 4 MG/DL (ref 2.5–4.9)
PLATELET # BLD AUTO: 209 X10*3/UL (ref 150–450)
POTASSIUM SERPL-SCNC: 3.7 MMOL/L (ref 3.5–5.3)
RBC # BLD AUTO: 2.91 X10*6/UL (ref 4–5.2)
RIGHT VENTRICLE FREE WALL PEAK S': 13.3 CM/S
RIGHT VENTRICLE PEAK SYSTOLIC PRESSURE: 43.5 MMHG
SODIUM SERPL-SCNC: 137 MMOL/L (ref 136–145)
TRICUSPID ANNULAR PLANE SYSTOLIC EXCURSION: 2 CM
TRIGL SERPL-MCNC: 83 MG/DL (ref 0–149)
VLDL: 17 MG/DL (ref 0–40)
WBC # BLD AUTO: 5.4 X10*3/UL (ref 4.4–11.3)

## 2025-02-15 PROCEDURE — 2500000002 HC RX 250 W HCPCS SELF ADMINISTERED DRUGS (ALT 637 FOR MEDICARE OP, ALT 636 FOR OP/ED): Performed by: INTERNAL MEDICINE

## 2025-02-15 PROCEDURE — 2060000001 HC INTERMEDIATE ICU ROOM DAILY

## 2025-02-15 PROCEDURE — 36415 COLL VENOUS BLD VENIPUNCTURE: CPT | Performed by: NURSE PRACTITIONER

## 2025-02-15 PROCEDURE — 99223 1ST HOSP IP/OBS HIGH 75: CPT | Performed by: INTERNAL MEDICINE

## 2025-02-15 PROCEDURE — 84100 ASSAY OF PHOSPHORUS: CPT | Performed by: NURSE PRACTITIONER

## 2025-02-15 PROCEDURE — 99291 CRITICAL CARE FIRST HOUR: CPT | Performed by: NURSE PRACTITIONER

## 2025-02-15 PROCEDURE — 93306 TTE W/DOPPLER COMPLETE: CPT | Performed by: INTERNAL MEDICINE

## 2025-02-15 PROCEDURE — 80048 BASIC METABOLIC PNL TOTAL CA: CPT | Performed by: NURSE PRACTITIONER

## 2025-02-15 PROCEDURE — 83036 HEMOGLOBIN GLYCOSYLATED A1C: CPT | Mod: WESLAB

## 2025-02-15 PROCEDURE — 82947 ASSAY GLUCOSE BLOOD QUANT: CPT

## 2025-02-15 PROCEDURE — 85027 COMPLETE CBC AUTOMATED: CPT | Performed by: NURSE PRACTITIONER

## 2025-02-15 PROCEDURE — 93005 ELECTROCARDIOGRAM TRACING: CPT

## 2025-02-15 PROCEDURE — 93010 ELECTROCARDIOGRAM REPORT: CPT | Performed by: INTERNAL MEDICINE

## 2025-02-15 PROCEDURE — 2500000002 HC RX 250 W HCPCS SELF ADMINISTERED DRUGS (ALT 637 FOR MEDICARE OP, ALT 636 FOR OP/ED): Performed by: NURSE PRACTITIONER

## 2025-02-15 PROCEDURE — 93306 TTE W/DOPPLER COMPLETE: CPT

## 2025-02-15 PROCEDURE — 2500000001 HC RX 250 WO HCPCS SELF ADMINISTERED DRUGS (ALT 637 FOR MEDICARE OP): Performed by: INTERNAL MEDICINE

## 2025-02-15 PROCEDURE — 2500000001 HC RX 250 WO HCPCS SELF ADMINISTERED DRUGS (ALT 637 FOR MEDICARE OP): Performed by: NURSE PRACTITIONER

## 2025-02-15 PROCEDURE — 80061 LIPID PANEL: CPT

## 2025-02-15 PROCEDURE — 83735 ASSAY OF MAGNESIUM: CPT | Performed by: NURSE PRACTITIONER

## 2025-02-15 PROCEDURE — 2500000004 HC RX 250 GENERAL PHARMACY W/ HCPCS (ALT 636 FOR OP/ED)

## 2025-02-15 RX ORDER — NAPROXEN SODIUM 220 MG/1
81 TABLET, FILM COATED ORAL DAILY
Status: DISCONTINUED | OUTPATIENT
Start: 2025-02-15 | End: 2025-02-17 | Stop reason: HOSPADM

## 2025-02-15 RX ORDER — HEPARIN SODIUM 5000 [USP'U]/ML
5000 INJECTION, SOLUTION INTRAVENOUS; SUBCUTANEOUS EVERY 8 HOURS SCHEDULED
Status: DISCONTINUED | OUTPATIENT
Start: 2025-02-15 | End: 2025-02-17 | Stop reason: HOSPADM

## 2025-02-15 RX ORDER — ERGOCALCIFEROL 1.25 MG/1
1250 CAPSULE ORAL
Status: DISCONTINUED | OUTPATIENT
Start: 2025-02-15 | End: 2025-02-17 | Stop reason: HOSPADM

## 2025-02-15 RX ORDER — ATORVASTATIN CALCIUM 20 MG/1
20 TABLET, FILM COATED ORAL NIGHTLY
Status: DISCONTINUED | OUTPATIENT
Start: 2025-02-15 | End: 2025-02-17

## 2025-02-15 RX ADMIN — ALLOPURINOL 200 MG: 100 TABLET ORAL at 10:24

## 2025-02-15 RX ADMIN — PANTOPRAZOLE SODIUM 40 MG: 40 TABLET, DELAYED RELEASE ORAL at 16:25

## 2025-02-15 RX ADMIN — TICAGRELOR 90 MG: 90 TABLET ORAL at 10:24

## 2025-02-15 RX ADMIN — PANTOPRAZOLE SODIUM 40 MG: 40 TABLET, DELAYED RELEASE ORAL at 10:24

## 2025-02-15 RX ADMIN — ERGOCALCIFEROL 1250 MCG: 1.25 CAPSULE ORAL at 13:09

## 2025-02-15 RX ADMIN — TICAGRELOR 90 MG: 90 TABLET ORAL at 22:30

## 2025-02-15 RX ADMIN — GABAPENTIN 300 MG: 300 CAPSULE ORAL at 20:23

## 2025-02-15 RX ADMIN — ATORVASTATIN CALCIUM 20 MG: 20 TABLET, FILM COATED ORAL at 01:14

## 2025-02-15 RX ADMIN — HEPARIN SODIUM 5000 UNITS: 5000 INJECTION, SOLUTION INTRAVENOUS; SUBCUTANEOUS at 10:29

## 2025-02-15 RX ADMIN — ATORVASTATIN CALCIUM 20 MG: 20 TABLET, FILM COATED ORAL at 21:00

## 2025-02-15 RX ADMIN — GABAPENTIN 300 MG: 300 CAPSULE ORAL at 01:14

## 2025-02-15 RX ADMIN — FERROUS SULFATE TAB 325 MG (65 MG ELEMENTAL FE) 325 MG: 325 (65 FE) TAB at 20:23

## 2025-02-15 RX ADMIN — HEPARIN SODIUM 5000 UNITS: 5000 INJECTION, SOLUTION INTRAVENOUS; SUBCUTANEOUS at 18:57

## 2025-02-15 RX ADMIN — ASPIRIN 81 MG: 81 TABLET, CHEWABLE ORAL at 10:24

## 2025-02-15 RX ADMIN — TICAGRELOR 90 MG: 90 TABLET ORAL at 21:00

## 2025-02-15 RX ADMIN — LEVOTHYROXINE SODIUM 50 MCG: 0.05 TABLET ORAL at 06:50

## 2025-02-15 RX ADMIN — FOLIC ACID 1 MG: 1 TABLET ORAL at 10:24

## 2025-02-15 RX ADMIN — ATORVASTATIN CALCIUM 20 MG: 20 TABLET, FILM COATED ORAL at 22:30

## 2025-02-15 SDOH — HEALTH STABILITY: PHYSICAL HEALTH
HOW OFTEN DO YOU NEED TO HAVE SOMEONE HELP YOU WHEN YOU READ INSTRUCTIONS, PAMPHLETS, OR OTHER WRITTEN MATERIAL FROM YOUR DOCTOR OR PHARMACY?: NEVER

## 2025-02-15 SDOH — ECONOMIC STABILITY: INCOME INSECURITY: IN THE PAST 12 MONTHS HAS THE ELECTRIC, GAS, OIL, OR WATER COMPANY THREATENED TO SHUT OFF SERVICES IN YOUR HOME?: NO

## 2025-02-15 SDOH — SOCIAL STABILITY: SOCIAL INSECURITY: HAVE YOU HAD ANY THOUGHTS OF HARMING ANYONE ELSE?: NO

## 2025-02-15 SDOH — SOCIAL STABILITY: SOCIAL INSECURITY: WITHIN THE LAST YEAR, HAVE YOU BEEN HUMILIATED OR EMOTIONALLY ABUSED IN OTHER WAYS BY YOUR PARTNER OR EX-PARTNER?: NO

## 2025-02-15 SDOH — SOCIAL STABILITY: SOCIAL INSECURITY: HAVE YOU HAD THOUGHTS OF HARMING ANYONE ELSE?: NO

## 2025-02-15 SDOH — HEALTH STABILITY: PHYSICAL HEALTH: ON AVERAGE, HOW MANY DAYS PER WEEK DO YOU ENGAGE IN MODERATE TO STRENUOUS EXERCISE (LIKE A BRISK WALK)?: 4 DAYS

## 2025-02-15 SDOH — SOCIAL STABILITY: SOCIAL INSECURITY: ARE THERE ANY APPARENT SIGNS OF INJURIES/BEHAVIORS THAT COULD BE RELATED TO ABUSE/NEGLECT?: NO

## 2025-02-15 SDOH — SOCIAL STABILITY: SOCIAL INSECURITY: WERE YOU ABLE TO COMPLETE ALL THE BEHAVIORAL HEALTH SCREENINGS?: YES

## 2025-02-15 SDOH — ECONOMIC STABILITY: FOOD INSECURITY: WITHIN THE PAST 12 MONTHS, THE FOOD YOU BOUGHT JUST DIDN'T LAST AND YOU DIDN'T HAVE MONEY TO GET MORE.: NEVER TRUE

## 2025-02-15 SDOH — ECONOMIC STABILITY: FOOD INSECURITY: WITHIN THE PAST 12 MONTHS, YOU WORRIED THAT YOUR FOOD WOULD RUN OUT BEFORE YOU GOT THE MONEY TO BUY MORE.: NEVER TRUE

## 2025-02-15 SDOH — SOCIAL STABILITY: SOCIAL INSECURITY: HAS ANYONE EVER THREATENED TO HURT YOUR FAMILY OR YOUR PETS?: NO

## 2025-02-15 SDOH — SOCIAL STABILITY: SOCIAL INSECURITY: WITHIN THE LAST YEAR, HAVE YOU BEEN AFRAID OF YOUR PARTNER OR EX-PARTNER?: NO

## 2025-02-15 SDOH — SOCIAL STABILITY: SOCIAL INSECURITY: DO YOU FEEL UNSAFE GOING BACK TO THE PLACE WHERE YOU ARE LIVING?: NO

## 2025-02-15 SDOH — SOCIAL STABILITY: SOCIAL INSECURITY: DOES ANYONE TRY TO KEEP YOU FROM HAVING/CONTACTING OTHER FRIENDS OR DOING THINGS OUTSIDE YOUR HOME?: NO

## 2025-02-15 SDOH — SOCIAL STABILITY: SOCIAL INSECURITY: DO YOU FEEL ANYONE HAS EXPLOITED OR TAKEN ADVANTAGE OF YOU FINANCIALLY OR OF YOUR PERSONAL PROPERTY?: NO

## 2025-02-15 SDOH — HEALTH STABILITY: PHYSICAL HEALTH: ON AVERAGE, HOW MANY MINUTES DO YOU ENGAGE IN EXERCISE AT THIS LEVEL?: 30 MIN

## 2025-02-15 SDOH — SOCIAL STABILITY: SOCIAL INSECURITY: ABUSE: ADULT

## 2025-02-15 SDOH — SOCIAL STABILITY: SOCIAL INSECURITY: ARE YOU OR HAVE YOU BEEN THREATENED OR ABUSED PHYSICALLY, EMOTIONALLY, OR SEXUALLY BY ANYONE?: NO

## 2025-02-15 ASSESSMENT — LIFESTYLE VARIABLES
AUDIT-C TOTAL SCORE: 1
PRESCIPTION_ABUSE_PAST_12_MONTHS: NO
HOW MANY STANDARD DRINKS CONTAINING ALCOHOL DO YOU HAVE ON A TYPICAL DAY: 1 OR 2
SKIP TO QUESTIONS 9-10: 1
HOW OFTEN DO YOU HAVE 6 OR MORE DRINKS ON ONE OCCASION: NEVER
SUBSTANCE_ABUSE_PAST_12_MONTHS: NO
HOW OFTEN DO YOU HAVE A DRINK CONTAINING ALCOHOL: MONTHLY OR LESS
AUDIT-C TOTAL SCORE: 1

## 2025-02-15 ASSESSMENT — COGNITIVE AND FUNCTIONAL STATUS - GENERAL
DAILY ACTIVITIY SCORE: 24
CLIMB 3 TO 5 STEPS WITH RAILING: A LITTLE
PATIENT BASELINE BEDBOUND: NO
CLIMB 3 TO 5 STEPS WITH RAILING: A LITTLE
WALKING IN HOSPITAL ROOM: A LITTLE
DAILY ACTIVITIY SCORE: 24
MOBILITY SCORE: 22
WALKING IN HOSPITAL ROOM: A LITTLE
MOBILITY SCORE: 22

## 2025-02-15 ASSESSMENT — PAIN - FUNCTIONAL ASSESSMENT
PAIN_FUNCTIONAL_ASSESSMENT: 0-10

## 2025-02-15 ASSESSMENT — ACTIVITIES OF DAILY LIVING (ADL)
HEARING - RIGHT EAR: FUNCTIONAL
ASSISTIVE_DEVICE: EYEGLASSES;WALKER
LACK_OF_TRANSPORTATION: NO
FEEDING YOURSELF: INDEPENDENT
ADEQUATE_TO_COMPLETE_ADL: YES
PATIENT'S MEMORY ADEQUATE TO SAFELY COMPLETE DAILY ACTIVITIES?: YES
DRESSING YOURSELF: INDEPENDENT
BATHING: INDEPENDENT
JUDGMENT_ADEQUATE_SAFELY_COMPLETE_DAILY_ACTIVITIES: YES
GROOMING: INDEPENDENT
HEARING - LEFT EAR: FUNCTIONAL
WALKS IN HOME: INDEPENDENT
TOILETING: INDEPENDENT

## 2025-02-15 ASSESSMENT — PAIN SCALES - GENERAL
PAINLEVEL_OUTOF10: 0 - NO PAIN

## 2025-02-15 ASSESSMENT — PATIENT HEALTH QUESTIONNAIRE - PHQ9
1. LITTLE INTEREST OR PLEASURE IN DOING THINGS: NOT AT ALL
SUM OF ALL RESPONSES TO PHQ9 QUESTIONS 1 & 2: 0
2. FEELING DOWN, DEPRESSED OR HOPELESS: NOT AT ALL

## 2025-02-15 NOTE — ED TRIAGE NOTES
Pt to ED via EMS for possible STEMI. EMS reports pt was at dinner when she began having chest and neck discomfort, more so in her neck. Pt states she felt like her throat was closing. Pt states she has hx of afib. VSS. Pt connected to full cardiac monitor.

## 2025-02-15 NOTE — CONSULTS
[facility]       Date:  2/15/2025  Patient:  Michell Medrano  YOB: 1935  MRN:  [id number]   Admit Date:  2/14/2025    No chief complaint on file.    History of Present Illness:  Michell Medrano is a 89 y.o. year old female patient with past medical history of GERD, hypothyroidism, breast CA (2000), a fib/flutter (not on AC d/t allergic reactions to previous AC), RA on methotrexate, who presented to the hospital last night via EMS after having throat discomfort and thinking she was having an allergic reaction. EKG was performed showing ST elevation in inferior leads. She was taken for left heart catheterization with Dr. Enriquez and had 2 stents placed in the RCA. She required a dopamine infusion during heart catheterization for hypotension which was weaned off prior to ICU arrival. She was loaded with Brilinta and ASA and admitted to ICU for post PCI care.     Interval ICU Events:    2/14: Admitted to ICU post Inferior STEMI with PCI and EMY x 2 placed to RCA. Arrived hemodynamically stable.     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    Generalized osteoarthritis     Past Surgical History:   Procedure Laterality Date    BREAST LUMPECTOMY  2000    OTHER SURGICAL HISTORY  12/05/2022    Lumpectomy    OTHER SURGICAL HISTORY  12/05/2022    Knee replacement    TOTAL ABDOMINAL HYSTERECTOMY W/ BILATERAL SALPINGOOPHORECTOMY  12/10/2014    Total Abdominal Hysterectomy With Removal Of Both Ovaries    TOTAL HIP ARTHROPLASTY  01/15/2014    Hip Replacement     Medications Prior to Admission   Medication Sig Dispense Refill Last Dose/Taking    acetaminophen (Tylenol) 325 mg tablet Take 2 tablets (650  mg) by mouth every 6 hours if needed.       allopurinol (Zyloprim) 100 mg tablet Take 2 tablets (200 mg) by mouth once daily. 90 tablet 3     ergocalciferol (Vitamin D-2) 1.25 MG (26608 UT) capsule Take 1 capsule (1,250 mcg) by mouth every 30 (thirty) days. Taking 2 times a month 24 capsule 1     estradiol (Estrace) 0.01 % (0.1 mg/gram) vaginal cream Insert 0.25 Applicatorfuls (1 g) into the vagina 2 times a week. 42.5 g 3     ferrous sulfate 325 (65 Fe) MG EC tablet Take 65 mg by mouth once daily with breakfast. Do not crush, chew, or split.       folic acid (Folvite) 1 mg tablet Take 1 tablet (1 mg) by mouth once daily.       gabapentin (Neurontin) 300 mg capsule Take 1 capsule (300 mg) by mouth once daily at bedtime. 90 capsule 3     levothyroxine (Synthroid, Levoxyl) 50 mcg tablet Take 1 tablet (50 mcg) by mouth early in the morning.. Take on an empty stomach at the same time each day, either 30 to 60 minutes prior to breakfast       methotrexate (Trexall) 2.5 mg tablet Take 8 tablets (20 mg total) by mouth 1 (one) time per week.  Follow directions carefully, and ask to explain any part you do not understand. Take exactly as directed. 96 tablet 3     [] nitrofurantoin, macrocrystal-monohydrate, (Macrobid) 100 mg capsule Take 1 capsule (100 mg) by mouth 2 times a day for 7 days. 14 capsule 0     omeprazole OTC (PriLOSEC OTC) 20 mg EC tablet Take 1 tablet (20 mg) by mouth once daily in the morning. Take before meals. Do not crush, chew, or split.       polyethylene glycol (Glycolax, Miralax) 17 gram packet Take 17 g by mouth once daily.       potassium chloride CR (Klor-Con) 10 mEq ER tablet Take 1 tablet (10 mEq) by mouth 3 times a day.       torsemide (Demadex) 10 mg tablet Take 2 tablets (20 mg) by mouth once daily. 180 tablet 3      Apixaban, Xarelto [rivaroxaban], Doxycycline, Erythromycin, Erythromycin base, Methen-m.blue-s.phos-phsal-hyo, Naproxen, Penicillins, Prednisone, Pseudoephedrine,  Tetracycline, Cephalosporins, and Red dye  Social History     Tobacco Use    Smoking status: Never    Smokeless tobacco: Never   Vaping Use    Vaping status: Never Used   Substance Use Topics    Alcohol use: Not Currently    Drug use: Never     No family history on file.    Hospital Medications:           Current Facility-Administered Medications:     acetaminophen (Tylenol) tablet 650 mg, 650 mg, oral, q6h PRN, Robin Enriquez DO    allopurinol (Zyloprim) tablet 200 mg, 200 mg, oral, Daily, Robin Enriquez DO    aspirin chewable tablet 81 mg, 81 mg, oral, Once, Robin Enriquez DO    aspirin chewable tablet 81 mg, 81 mg, oral, Daily, Estiven Vallejo, APRN-CNP    aspirin chewable tablet, , oral, Code/trauma/sedation med, Anh York DO, 324 mg at 02/14/25 2126    atorvastatin (Lipitor) tablet 20 mg, 20 mg, oral, Nightly, Robin Enriquez DO    ergocalciferol (Vitamin D-2) capsule 1,250 mcg, 1,250 mcg, oral, q30 days, Robin Enriquez DO    ferrous sulfate (325 mg ferrous sulfate) tablet 325 mg, 325 mg, oral, Daily with breakfast, Robin Enriquez DO    folic acid (Folvite) tablet 1 mg, 1 mg, oral, Daily, Robin Enriquez DO    gabapentin (Neurontin) capsule 300 mg, 300 mg, oral, Nightly, Robin Enriquez DO    heparin (porcine) injection 5,000 unit/mL  - Omnicell Override Pull, , , ,     heparin (porcine) injection, , intravenous, Code/trauma/sedation med, Anh York DO, 4,000 Units at 02/14/25 2123    levothyroxine (Synthroid, Levoxyl) tablet 50 mcg, 50 mcg, oral, Daily, Robin Enriquez DO    [START ON 2/16/2025] methotrexate (Trexall) tablet 20 mg, 20 mg, oral, Every Sunday, Robin Enriquez DO    pantoprazole (ProtoNix) EC tablet 40 mg, 40 mg, oral, Daily before breakfast, Robin Enriquez DO    polyethylene glycol (Glycolax, Miralax) packet 17 g, 17 g, oral, Daily, Robin Enriquez DO    ticagrelor (Brilinta) tablet 180 mg, 180 mg, oral, Once, Robin Enriquez DO    ticagrelor (Brilinta) tablet 90 mg, 90 mg, oral,  "BID, Estiven Vallejo, APRN-CNP    torsemide (Demadex) tablet 20 mg, 20 mg, oral, Daily, Robin Enriquez DO    Review of Systems:  14 point review of systems was completed and negative except for those specially mention in my HPI    Physical Exam:    Heart Rate:  [58-77]   Temp:  [36.7 °C (98.1 °F)]   Resp:  [14-21]   BP: (107-113)/(48-61)   Height:  [162.6 cm (5' 4\")]   Weight:  [63.5 kg (139 lb 15.9 oz)]   SpO2:  [93 %-100 %]     Physical Exam  Vitals and nursing note reviewed.   Constitutional:       General: She is awake.   HENT:      Head: Normocephalic.   Eyes:      Extraocular Movements: Extraocular movements intact.      Pupils: Pupils are equal, round, and reactive to light.   Cardiovascular:      Rate and Rhythm: Normal rate and regular rhythm.      Pulses:           Dorsalis pedis pulses are 1+ on the right side and 1+ on the left side.      Heart sounds: Normal heart sounds, S1 normal and S2 normal.   Pulmonary:      Effort: No tachypnea, accessory muscle usage or respiratory distress.      Breath sounds: Normal breath sounds.   Abdominal:      General: Abdomen is flat. Bowel sounds are normal.      Palpations: Abdomen is soft.      Tenderness: There is no abdominal tenderness.          Comments: Right groin cath site dressing dry and intact, no ecchymosis, site soft and non tender   Musculoskeletal:      Cervical back: Neck supple.      Right lower leg: No edema.      Left lower leg: No edema.      Comments: NAVARRETE   Skin:     General: Skin is warm and dry.      Capillary Refill: Capillary refill takes less than 2 seconds.      Coloration: Skin is pale. Skin is not ashen, cyanotic, jaundiced or mottled.   Neurological:      Mental Status: She is alert and oriented to person, place, and time.      GCS: GCS eye subscore is 4. GCS verbal subscore is 5. GCS motor subscore is 6.   Psychiatric:         Mood and Affect: Mood normal.         Speech: Speech normal.         Behavior: Behavior normal. Behavior is " cooperative.         Objective:    I have reviewed all medications, laboratory results, and imaging pertinent for today's encounter.           Intake/Output Summary (Last 24 hours) at 2/15/2025 0033  Last data filed at 2/14/2025 2308  Gross per 24 hour   Intake --   Output 10 ml   Net -10 ml       Daily Labs:  CBC:   Results from last 7 days   Lab Units 02/14/25  2128   WBC AUTO x10*3/uL 5.7   HEMOGLOBIN g/dL 8.8*   HEMATOCRIT % 27.7*   MCV fL 103*     BMP:    Results from last 7 days   Lab Units 02/14/25  2128   SODIUM mmol/L 135*   POTASSIUM mmol/L 3.6   CHLORIDE mmol/L 101   CO2 mmol/L 19*   BUN mg/dL 40*   CREATININE mg/dL 1.41*   CALCIUM mg/dL 8.8   GLUCOSE mg/dL 195*       Assessment/Plan:  89 y.o. year old female patient with past medical history of GERD, hypothyroidism, breast CA (2000), a fib/flutter (not on AC d/t allergic reactions to previous AC), RA on methotrexate here for STEMI now post PCI with EMY to RCA x 2.      I am currently managing this critically ill patient for the following problems:    Neuro/Psych/Pain Ctrl/Sedation:  No acute issues  -Pain Control: Tylenol PRN  -CAM Assessment every shift, Promote Sleep/wake hygiene    Respiratory/ENT:  No acute issues  -Currently stable on RA  -Maintain SPO2 > 92%  -Promote Pulm Hygiene Daily     Cardiovascular:  #CAD c/b inferior STEMI - s/p PCI with EMY x 2 to RCA  #Hx A fib/flutter - not on AC  Left venticulogram s/p PCI of RCA with minimal inferior wall hypokinesis, EF 65-70%  -Loaded with ASA and Brilinta  -Will continue ASA 81 mg daily, Brilinta 90 mg BID per cards recommendation  -Continue Statin  -Neurovascular checks per post cath protocol  -Maintain MAP > 65   -Continuous Cardiac Monitoring     GI:  #Hx GERD  Diet: Cardiac  Ulcer Prophylaxis: Continue home PPI  Bowel Prophylaxis: Miralax Daily    Renal/Volume Status (Intra & Extravascular):  #CLAY  Cr 1.41 on arrival, baseline appears to be around 0.9  -Holding home Demadex for now  -Avoid  further nephrotoxins  -Maintain UOP 0.5-1.0 mL/kg/hr, notify provider if less than ideal  -Daily BMP, Mag & Phos, Replete electrolytes as indicated     Endocrine  #Hypothyroidism  -Continue home levothyroxine dosing  -POCT Glucose PRN, will add SSI if needed    Infectious Disease:  No acute issues  -Monitor for SIRS    Heme/Onc:  #Hx Breast CA  #Hx Anemia - required transfusions in the past  # Hx RA  Hbg ranges from 7-11  over past few months  -Continue home iron supplementation  -Continue home methotrexate  -Monitor for s/s of anemia now on ASA and Brilinta  -CBC daily, Transfuse for Hgb < 7     OBGYN/MSK:  No acute issues  -Padded pressure points  -Skin Protocol per ICU     Ethics/Code Status:  FULL CODE    :  DVT Prophylaxis: Heparin SubQ  GI Prophylaxis: PPI  Bowel Regimen: Miralax  Diet: Cardiac  CVC: None  Jeri: None  Cardona: None  Restraints: None  Dispo: Admitted to ICU      I have reviewed all medications, laboratory results, and imaging pertinent for today's encounter.    Critical Care Time: 30 minutes  Critical Care Estiven Roche CNP

## 2025-02-15 NOTE — CONSULTS
Consults  [facility]       Date:  2/15/2025  Patient:  Michell Medrano  YOB: 1935  MRN:  [id number]   Admit Date:  2/14/2025      No chief complaint on file.        History of Present Illness:  Michell Medrano is a 89 y.o. year old female patient with Past Medical History of  GERD, hypothyroidism, breast CA (2000), a fib/flutter (not on AC d/t allergic reactions to previous AC), RA on methotrexate, who presented to the hospital last night via EMS after having throat discomfort and thinking she was having an allergic reaction. EKG was performed showing ST elevation in inferior leads. She was taken for left heart catheterization with Dr. Enriquez and had 2 stents placed in the RCA. She required a dopamine infusion during heart catheterization for hypotension which was weaned off prior to ICU arrival. She was loaded with Brilinta and ASA and admitted to ICU for post PCI care.      Interval ICU Events:     2/14: Admitted to ICU post Inferior STEMI with PCI and EMY x 2 placed to RCA. Arrived hemodynamically stable.        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    Generalized osteoarthritis     Past Surgical History:   Procedure Laterality Date    BREAST LUMPECTOMY  2000    OTHER SURGICAL HISTORY  12/05/2022    Lumpectomy    OTHER SURGICAL HISTORY Bilateral 12/05/2022    Knee replacement    TOTAL ABDOMINAL HYSTERECTOMY W/ BILATERAL SALPINGOOPHORECTOMY  12/10/2014    Total Abdominal Hysterectomy With Removal Of Both Ovaries    TOTAL HIP ARTHROPLASTY Bilateral 01/15/2014    Hip Replacement     Medications Prior to Admission   Medication Sig Dispense Refill Last Dose/Taking    acetaminophen  (Tylenol) 325 mg tablet Take 2 tablets (650 mg) by mouth every 6 hours if needed.   Past Week    allopurinol (Zyloprim) 100 mg tablet Take 2 tablets (200 mg) by mouth once daily. 90 tablet 3 2025 Morning    ergocalciferol (Vitamin D-2) 1.25 MG (38731 UT) capsule Take 1 capsule (1,250 mcg) by mouth every 30 (thirty) days. Taking 2 times a month 24 capsule 1 Past Month    estradiol (Estrace) 0.01 % (0.1 mg/gram) vaginal cream Insert 0.25 Applicatorfuls (1 g) into the vagina 2 times a week. 42.5 g 3 2025    ferrous sulfate 325 (65 Fe) MG EC tablet Take 65 mg by mouth once daily with breakfast. Do not crush, chew, or split.   2025 Evening    folic acid (Folvite) 1 mg tablet Take 1 tablet (1 mg) by mouth once daily.   2025    gabapentin (Neurontin) 300 mg capsule Take 1 capsule (300 mg) by mouth once daily at bedtime. 90 capsule 3 2025 Bedtime    levothyroxine (Synthroid, Levoxyl) 50 mcg tablet Take 1 tablet (50 mcg) by mouth early in the morning.. Take on an empty stomach at the same time each day, either 30 to 60 minutes prior to breakfast   2025 Morning    omeprazole OTC (PriLOSEC OTC) 20 mg EC tablet Take 1 tablet (20 mg) by mouth once daily in the morning. Take before meals. Do not crush, chew, or split.   2025 Evening    polyethylene glycol (Glycolax, Miralax) 17 gram packet Take 17 g by mouth once daily.   2025 Morning    potassium chloride CR (Klor-Con) 10 mEq ER tablet Take 1 tablet (10 mEq) by mouth 3 times a day.   2025    torsemide (Demadex) 10 mg tablet Take 2 tablets (20 mg) by mouth once daily. 180 tablet 3 2025 Morning    methotrexate (Trexall) 2.5 mg tablet Take 8 tablets (20 mg total) by mouth 1 (one) time per week.  Follow directions carefully, and ask to explain any part you do not understand. Take exactly as directed. 96 tablet 3 2025 Evening    [] nitrofurantoin, macrocrystal-monohydrate, (Macrobid) 100 mg capsule Take 1 capsule (100 mg)  by mouth 2 times a day for 7 days. 14 capsule 0      Apixaban, Xarelto [rivaroxaban], Doxycycline, Erythromycin, Erythromycin base, Methen-m.blue-s.phos-phsal-hyo, Naproxen, Penicillins, Prednisone, Pseudoephedrine, Tetracycline, Cephalosporins, and Red dye  Social History     Tobacco Use    Smoking status: Never    Smokeless tobacco: Never   Vaping Use    Vaping status: Never Used   Substance Use Topics    Alcohol use: Not Currently    Drug use: Never     No family history on file.    Hospital Medications:           Current Facility-Administered Medications:     acetaminophen (Tylenol) tablet 650 mg, 650 mg, oral, q6h PRN, Robin Enriquez DO    allopurinol (Zyloprim) tablet 200 mg, 200 mg, oral, Daily, Robin Enriquez DO    aspirin chewable tablet 81 mg, 81 mg, oral, Daily, Estiven Vallejo, APRN-CNP    aspirin chewable tablet, , oral, Code/trauma/sedation med, Anh York, DO, 324 mg at 02/14/25 2126    atorvastatin (Lipitor) tablet 20 mg, 20 mg, oral, Nightly, Robin Enriquez DO, 20 mg at 02/15/25 0114    ergocalciferol (Vitamin D-2) capsule 1,250 mcg, 1,250 mcg, oral, q30 days, Robin Enriquez DO    ferrous sulfate (325 mg ferrous sulfate) tablet 325 mg, 325 mg, oral, Daily with breakfast, Robin Enriquez DO    folic acid (Folvite) tablet 1 mg, 1 mg, oral, Daily, Robin Enriquez DO    gabapentin (Neurontin) capsule 300 mg, 300 mg, oral, Nightly, Robin Enriquez DO, 300 mg at 02/15/25 0114    heparin (porcine) injection 5,000 unit/mL  - Omnicell Override Pull, , , ,     heparin (porcine) injection 5,000 Units, 5,000 Units, subcutaneous, q8h [facility], Marya Lambert PA-C    heparin (porcine) injection, , intravenous, Code/trauma/sedation med, Anh Hurstoff, DO, 4,000 Units at 02/14/25 2123    levothyroxine (Synthroid, Levoxyl) tablet 50 mcg, 50 mcg, oral, Daily, Robin Enriquez DO, 50 mcg at 02/15/25 0650    [START ON 2/16/2025] methotrexate (Trexall) tablet 20 mg, 20 mg, oral, Every Sunday, Robin Enriquez DO     "pantoprazole (ProtoNix) EC tablet 40 mg, 40 mg, oral, Daily before breakfast, Robin Enriquez DO    polyethylene glycol (Glycolax, Miralax) packet 17 g, 17 g, oral, Daily, Robin Enriquez DO    ticagrelor (Brilinta) tablet 90 mg, 90 mg, oral, BID, Estiven Vallejo, APRN-CNP    [Held by provider] torsemide (Demadex) tablet 20 mg, 20 mg, oral, Daily, Robin Enriquez DO    Review of Systems:  14 point review of systems was completed and negative except for those specially mention in my HPI    Physical Exam:    Heart Rate:  [58-92]   Temp:  [36.4 °C (97.5 °F)-36.7 °C (98.1 °F)]   Resp:  [12-24]   BP: (107-151)/(48-72)   Height:  [162.6 cm (5' 4\")]   Weight:  [61.1 kg (134 lb 11.2 oz)-63.5 kg (139 lb 15.9 oz)]   SpO2:  [93 %-100 %]     Physical Exam  HENT:      Mouth/Throat:      Mouth: Mucous membranes are moist.      Pharynx: Oropharynx is clear.   Eyes:      Pupils: Pupils are equal, round, and reactive to light.   Cardiovascular:      Rate and Rhythm: Normal rate and regular rhythm.      Pulses: Normal pulses.   Pulmonary:      Effort: Pulmonary effort is normal.   Abdominal:      General: Abdomen is flat.      Palpations: Abdomen is soft.   Musculoskeletal:         General: Normal range of motion.   Skin:     General: Skin is warm and dry.      Capillary Refill: Capillary refill takes less than 2 seconds.   Neurological:      General: No focal deficit present.      Mental Status: She is alert and oriented to person, place, and time. Mental status is at baseline.         Objective:    I have reviewed all medications, laboratory results, and imaging pertinent for today's encounter.           Intake/Output Summary (Last 24 hours) at 2/15/2025 0890  Last data filed at 2/15/2025 0430  Gross per 24 hour   Intake 150 ml   Output 10 ml   Net 140 ml         Assessment/Plan:    89 y.o. year old female patient with past medical history of GERD, hypothyroidism, breast CA (2000), a fib/flutter (not on AC d/t allergic reactions to " previous AC), RA on methotrexate here for STEMI now post PCI with EMY to RCA x 2.     I am currently managing this critically ill patient for the following problems:     Neuro/Psych/Pain Ctrl/Sedation:  No acute issues  -Pain Control: Tylenol PRN  -CAM Assessment every shift, Promote Sleep/wake hygiene     Respiratory/ENT:  No acute issues  -Currently stable on RA  -Maintain SPO2 > 92%  -Promote Pulm Hygiene Daily      Cardiovascular:  #CAD c/b inferior STEMI - s/p PCI with EMY x 2 to RCA  #Hx A fib/flutter - not on AC  Left venticulogram s/p PCI of RCA with minimal inferior wall hypokinesis, EF 65-70%  -Loaded with ASA and Brilinta  -Will continue ASA 81 mg daily, Brilinta 90 mg BID per cards recommendation  -Continue Statin  -Neurovascular checks per post cath protocol  -Maintain MAP > 65   -Some chest pain overnight, please make team aware to properly assess patient  -Continuous Cardiac Monitoring   -Echo pending     GI:  #Hx GERD  Diet: Cardiac  Ulcer Prophylaxis: Continue home PPI  Bowel Prophylaxis: Miralax Daily     Renal/Volume Status (Intra & Extravascular):  #CLAY  Cr 1.41 on arrival, baseline appears to be around 0.9  -Holding home Demadex for now  -Avoid further nephrotoxins  -Maintain UOP 0.5-1.0 mL/kg/hr, notify provider if less than ideal  -Daily BMP, Mag & Phos, Replete electrolytes as indicated     Endocrine  #Hypothyroidism  -Continue home levothyroxine dosing  -POCT Glucose PRN, will add SSI if needed     Infectious Disease:  No acute issues  -Monitor for SIRS     Heme/Onc:  #Hx Breast CA  #Hx Anemia - required transfusions in the past  # Hx RA  Hbg ranges from 7-11  over past few months  -Continue home iron supplementation  -Continue home methotrexate  -Monitor for s/s of anemia now on ASA and Brilinta  -CBC daily, Transfuse for Hgb < 7      OBGYN/MSK:  No acute issues  -Padded pressure points  -Skin Protocol per ICU      Ethics/Code Status:  FULL CODE     :  DVT Prophylaxis:  Heparin SubQ  GI Prophylaxis: PPI  Bowel Regimen: Miralax  Diet: Cardiac  CVC: None  Cullen: None  Cardona: None  Restraints: None  Dispo: Cardiology has downgraded patient, we will sign off at this point, please reconsult for any further needs from the intensivist group    Critical Care Time:  30 minutes spent in preparing to see patient (I.e. review of medical records), evaluation of diagnostics (I.e. labs, imaging, etc.), documentation, discussing plan of care with patient/ family/ caregiver, and/ or coordination of care with multidisciplinary team. Time does not include completion of procedure time.       Victor Manuel Ortega, APRN-CNP

## 2025-02-15 NOTE — PROGRESS NOTES
Subjective Data:   89 y.o. female presenting with chest pain and an ischemic EKG consistent with an inferior wall STEMI. Day1    Overnight Events:    Able after right coronary artery PCI with overlapping stents     Objective Data:  Last Recorded Vitals:  Vitals:    02/15/25 0500 02/15/25 0600 02/15/25 0700 02/15/25 0800   BP: 122/66  129/64 130/66   BP Location:       Patient Position:       Pulse: 73  70 70   Resp: 12  15 13   Temp:    36.7 °C (98.1 °F)   TempSrc:    Oral   SpO2: 99%  100% 100%   Weight:  61.1 kg (134 lb 11.2 oz)     Height:           Last Labs:  CBC - 2/15/2025:  4:09 AM  5.4 9.4 209    28.8      CMP - 2/15/2025:  4:09 AM  8.1 6.3 57 --- 0.6   4.0 3.6 43 115      PTT - 2/12/2025:  2:26 PM  1.2   12.4 30     TROPHS   Date/Time Value Ref Range Status   02/14/2025 09:28  0 - 13 ng/L Final   11/22/2024 09:06 AM 23 0 - 13 ng/L Final   11/21/2024 02:57 PM 16 0 - 13 ng/L Final     HGBA1C   Date/Time Value Ref Range Status   03/14/2019 01:06 PM 5.4 % Final     Comment:          Diagnosis of Diabetes-Adults   Non-Diabetic: < or = 5.6%   Increased risk for developing diabetes: 5.7-6.4%   Diagnostic of diabetes: > or = 6.5%  .       Monitoring of Diabetes                Age (y)     Therapeutic Goal (%)   Adults:          >18           <7.0   Pediatrics:    13-18           <7.5                   7-12           <8.0                   0- 6            7.5-8.5   American Diabetes Association. Diabetes Care 33(S1), Jan 2010.       LDLCALC   Date/Time Value Ref Range Status   02/15/2025 04:09 AM 83 <=99 mg/dL Final     Comment:                                 Near   Borderline      AGE      Desirable  Optimal    High     High     Very High     0-19 Y     0 - 109     ---    110-129   >/= 130     ----    20-24 Y     0 - 119     ---    120-159   >/= 160     ----      >24 Y     0 -  99   100-129  130-159   160-189     >/=190     03/25/2024 10:16  <=99 mg/dL Final     Comment:                                  "Near   Borderline      AGE      Desirable  Optimal    High     High     Very High     0-19 Y     0 - 109     ---    110-129   >/= 130     ----    20-24 Y     0 - 119     ---    120-159   >/= 160     ----      >24 Y     0 -  99   100-129  130-159   160-189     >/=190       VLDL   Date/Time Value Ref Range Status   02/15/2025 04:09 AM 17 0 - 40 mg/dL Final   03/25/2024 10:16 AM 23 0 - 40 mg/dL Final   03/22/2023 06:06 PM 24 0 - 40 mg/dL Final   03/24/2022 10:20 AM 24 0 - 40 mg/dL Final   03/17/2021 11:34 AM 23 0 - 40 mg/dL Final      Last I/O:  I/O last 3 completed shifts:  In: 150 (2.5 mL/kg) [P.O.:150]  Out: 10 (0.2 mL/kg) [Blood:10]  Weight: 61.1 kg     Past Cardiology Tests (Last 3 Years):  EKG:  ECG 12 lead 11/21/2024      ECG 12 lead 09/10/2024    Echo:  No results found for this or any previous visit from the past 1095 days.    Ejection Fractions:  No results found for: \"EF\"  Cath:  No results found for this or any previous visit from the past 1095 days.    Stress Test:  Nuclear Stress Test 10/15/2024    Cardiac Imaging:  No results found for this or any previous visit from the past 1095 days.      Inpatient Medications:  Scheduled medications   Medication Dose Route Frequency    allopurinol  200 mg oral Daily    aspirin  81 mg oral Daily    atorvastatin  20 mg oral Nightly    [START ON 2/21/2025] ergocalciferol  1,250 mcg oral q30 days    ferrous sulfate (325 mg ferrous sulfate)  325 mg oral Daily with breakfast    folic acid  1 mg oral Daily    gabapentin  300 mg oral Nightly    heparin (porcine)        heparin (porcine)  5,000 Units subcutaneous q8h MANN    levothyroxine  50 mcg oral Daily    [START ON 2/16/2025] methotrexate  20 mg oral Every Sunday    pantoprazole  40 mg oral Daily before breakfast    polyethylene glycol  17 g oral Daily    ticagrelor  90 mg oral BID    [Held by provider] torsemide  20 mg oral Daily     PRN medications   Medication    acetaminophen    aspirin    heparin (porcine)    heparin " (porcine)     Continuous Medications   Medication Dose Last Rate       Physical Exam:  Neck:  Normal jugular venous pressure,   Lungs:  Clear to auscultation without wheezing or rhonchi or rales  Heart: Irregular with variable S1   Abdomen:  Soft, good bowel sounds, nontender,   Extremities:  Good radial, femoral,  pulses without pretibial edema, right groin well-healed cath site,  Neurological:  No focal sensory or motor deficits,     Assessment/Plan   Assessment/Plan  #1.  Inferior wall STEMI     2.  Remote smoker     3.  Rheumatoid arthritis with chronic methotrexate therapy     Code Status:  Full Code     2/15: She will be managed with guideline directed medical therapy including low-dose aspirin 81 mg daily, Brilinta 90 mg twice daily, and moderate-dose statin therapy if she can tolerate this.  We can add Zetia at a later date if beneficial based on her lipid profile.  Echocardiography will be performed today .  Defer beta-blocker therapy in the setting of rate controlled atrial fibrillation and marginal blood pressures in the 90s to low 100s systolic at this time.    2/15: Remains angina free, stable hemodynamics.  Her blood pressure is 130/66, pulse 70, respirations 13, O2 sat 100% on room air and her blood sugar is normal.  She has chronic anemia and labile hemoglobins, and her hemoglobin was 11.5 on 2/12/2025, 8.8 last night, 9.4 this morning.  Platelet count normal at 209K remains in atrial fibrillation with good rate control.  Echocardiogram is pending                Code Status:  Full Code    BUTCH Enriquez DO

## 2025-02-15 NOTE — POST-PROCEDURE NOTE
Physician Transition of Care Summary  Invasive Cardiovascular Lab    Procedure Date: 2/14/2025  Attending:    * Robin Enriquez - Primary  Resident/Fellow/Other Assistant: Surgeons and Role:  * No surgeons found with a matching role *    Indications:   Pre-op Diagnosis      * STEMI (ST elevation myocardial infarction) (Multi) [I21.3]    Post-procedure diagnosis:   Post-op Diagnosis     * STEMI (ST elevation myocardial infarction) (Multi) [I21.3]    Procedure(s):   PCI    Left Heart Cath, No LV  [id number] - ID L HRT CATH W/NJX L VENTRICULOGRAPHY IMG S&I        Procedure Findings:   See the dictated cardiac catheterization report    Description of the Procedure:   The patient was brought emergently to the cardiac catheterization lab from the [facility] emergency room with ongoing chest pain in the setting of inferior wall STEMI.  Both groins prepped and draped in sterile fashion and remainder's procedures performed under sterile technique.  10 cc 1% lidocaine used to the right groin for local anesthesia and Versed 1 mg and fentanyl 25 mcg used for sedation.  The patient received 4000 units of heparin in the emergency room, and an additional 2000 units in the cardiac Cath Lab with the ACT at 240, and aspirin 81 mg had been given in the emergency room and Brilinta 180 mg given in the cardiac catheterization lab.  Zofran 4 mg intravenously was used for sedation, and dopamine 3 mcg/kg/min ordered in the cardiac Cath Lab to maintain systolic blood pressure 90 mmHg or greater and then after successful PCI the dopamine was weaned and discontinued.  We used a 6 Italian JL 4 diagnostic catheter and 6 Italian pigtail catheter and 6 Italian JR4 interventional guide for for the right coronary artery along with the PT to 182 cm 0.014 guidewire.  At the end of the procedure the sheath was removed and an Angio-Seal closure device was deployed with good hemostasis in the right groin and she was returned to the intensive care unit angina free  and hemodynamically stable.    Coronary angiography     #1 the left main was a large vessel that bifurcated into the LAD and circumflex and had no obstructive disease.    2.  The LAD was a large vessel that extended to the apex and was a tortuous vessel the proximal and mid LAD had some aneurysmal dilation but mild nonobstructive disease.  There was a modest sized diagonal branch that had 50 to 60% ostial and proximal narrowing.    3.  The circumflex was a nondominant vessel and the circumflex and its branches had nonobstructive disease    4.  The right coronary artery was a large dominant vessel that had mild 40% smooth proximal stenosis and then the mid vessel had severe diffuse 80% hazy stenosis from the proximal vessel extending throughout the mid vessel.  The distal right coronary gave rise to a moderate-sized PDA branch and posterolateral branches that had mild nonobstructive disease.    Left ventriculogram was performed after PCI of the right coronary artery with a 10 cc hand contrast injection to minimize contrast load in the setting of chronic kidney disease.  There was minimal inferior wall hypokinesis with overall left ventricular ejection fraction estimated at 65 to 70%.    PCI intervention:  Successful right coronary artery intervention initially with a 2.5/12 mm balloon placed in the mid right coronary artery and at this point the patient started to become nauseated and hypotensive and intravenous saline bolus maintained and dopamine started at 3 mics per kilogram per minute.  This balloon was exchanged for a 3.5/30 mm drug-eluting stent and deployed to 3.6 mm, and then postdilated with a 3.5/12 mm NC balloon to 3.62 mm with good step up proximally and mid and distally.  The distal coronary artery just beyond the distal stent edge had an irregular lumen that was suspicious for localized plaque disruption or localized dissection so an overlapping 3.5/12 mm second drug-eluting stent was deployed to 3.6  mm and dilated to 3.6 to millimeters with the 3.5 mm noncompliant balloon with good step up and overall 0% residual stenosis.  There was a focal region in the mid stented segment that had a residual 20% stenosis in area that was felt to be a fibrotic lesion.  At the end of the procedure she was angina free and hemodynamically stable.    Complications:   None    Stents/Implants:   Implants       Stent    Stent, Jairon Arecibo Gilberto, 3.50 X 30rx - [id number] - Implanted        Inventory item: STENT, JAIRON FRONTIER GILBERTO, 3.50 X 30RX Model/Cat number: [id number]    : [employer] Lot number: [id number]    Device identifier: [id number]        GUDID Information       Request status Successful        Brand name: Acworth Arecibo™ Version/Model: [id number]    Company name: [employer] MRI safety info as of 2/14/25: MR Conditional    Contains dry or latex rubber: No      GMDN P.T. name: Drug-eluting coronary artery stent, non-bioabsorbable-polymer-coated                As of 2/14/2025       Status: Implanted                      Stent, Acworth Arecibo Gilberto, 3.50 X 12rx - [id number] - Implanted        Inventory item: STENT, JAIRON FRONTIER GILBERTO, 3.50 X 12RX Model/Cat number: [id number]    : [employer] Lot number: [id number]    Device identifier: [id number]        GUDID Information       Request status Successful        Brand name: Acworth Arecibo™ Version/Model: [id number]    Company name: [employer] MRI safety info as of 2/14/25: MR Conditional    Contains dry or latex rubber: No      GMDN P.T. name: Drug-eluting coronary artery stent, non-bioabsorbable-polymer-coated                As of 2/14/2025       Status: Implanted                              Anticoagulation/Antiplatelet Plan:   Aspirin 81 mg daily indefinitely, Brilinta milligrams twice daily for 6 to 12 months.    Estimated Blood Loss:   10 mL    Anesthesia: Moderate Sedation Anesthesia Staff: No anesthesia staff  entered.    Any Specimen(s) Removed:   No specimens collected during this procedure.    Disposition:   Transferred to the ICU angina free with stable hemodynamics      Electronically signed by: Robin Enriquez DO, 2/14/2025 11:55 PM

## 2025-02-15 NOTE — H&P
History Of Present Illness:    Michell Medrano is a 89 y.o. female presenting with chest pain and an ischemic EKG consistent with an inferior wall STEMI.  The symptoms came on abruptly as she was finishing her dinner this evening, and she had some intermittent shortness of breath since November 2024 after gallbladder surgery.  She had a previous nonischemic nuclear stress test in November 2024 done in the [facility] system.    Her EKG by the EMS squad showed atrial fibrillation with a controlled rate and evidence of marked ST elevation in her inferior leads     Last Recorded Vitals:  Vitals:    02/14/25 2216 02/14/25 2314 02/14/25 2335 02/15/25 0000   BP:  107/52 111/61 113/61   BP Location:   Right arm    Patient Position:   Lying    Pulse:  77 74 77   Resp:  14 15 21   Temp:       TempSrc:   Oral    SpO2: 99% 100% 96% 98%   Weight:   63.5 kg (139 lb 15.9 oz)        Last Labs:  CBC - 2/14/2025:  9:28 PM  5.7 8.8 205    27.7      CMP - 2/14/2025:  9:28 PM  8.8 6.3 57 --- 0.6   _ 3.6 43 115      PTT - 2/12/2025:  2:26 PM  1.2   12.4 30     Troponin I, High Sensitivity   Date/Time Value Ref Range Status   02/14/2025 09:28  (HH) 0 - 13 ng/L Final   11/22/2024 09:06 AM 23 (H) 0 - 13 ng/L Final   11/21/2024 02:57 PM 16 (H) 0 - 13 ng/L Final     Hemoglobin A1C   Date/Time Value Ref Range Status   03/14/2019 01:06 PM 5.4 % Final     Comment:          Diagnosis of Diabetes-Adults   Non-Diabetic: < or = 5.6%   Increased risk for developing diabetes: 5.7-6.4%   Diagnostic of diabetes: > or = 6.5%  .       Monitoring of Diabetes                Age (y)     Therapeutic Goal (%)   Adults:          >18           <7.0   Pediatrics:    13-18           <7.5                   7-12           <8.0                   0- 6            7.5-8.5   American Diabetes Association. Diabetes Care 33(S1), Jan 2010.       LDL Calculated   Date/Time Value Ref Range Status   03/25/2024 10:16  (H) <=99 mg/dL Final     Comment:               "                   Near   Borderline      AGE      Desirable  Optimal    High     High     Very High     0-19 Y     0 - 109     ---    110-129   >/= 130     ----    20-24 Y     0 - 119     ---    120-159   >/= 160     ----      >24 Y     0 -  99   100-129  130-159   160-189     >/=190       VLDL   Date/Time Value Ref Range Status   03/25/2024 10:16 AM 23 0 - 40 mg/dL Final   03/22/2023 06:06 PM 24 0 - 40 mg/dL Final   03/24/2022 10:20 AM 24 0 - 40 mg/dL Final   03/17/2021 11:34 AM 23 0 - 40 mg/dL Final      Last I/O:  No intake/output data recorded.    Past Cardiology Tests (Last 3 Years):  EKG:  ECG 12 lead 11/21/2024      ECG 12 lead 09/10/2024    Echo:  No results found for this or any previous visit from the past 1095 days.    Ejection Fractions:  No results found for: \"EF\"  Cath:  No results found for this or any previous visit from the past 1095 days.    Stress Test:  Nuclear Stress Test 10/15/2024    Cardiac Imaging:  No results found for this or any previous visit from the past 1095 days.      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    Past Surgical History:  She has a past surgical history that includes Total hip arthroplasty (01/15/2014); Other surgical history (12/05/2022); Other surgical history (12/05/2022); Total abdominal hysterectomy w/ bilateral salpingoophorectomy (12/10/2014); and Breast lumpectomy (2000).      Social History:  She reports that she has never smoked. She has never used smokeless tobacco. She reports that she does not currently use alcohol. She reports that she does not use drugs.    Family History:  No family history on file.     Allergies:  Apixaban, Xarelto [rivaroxaban], Doxycycline, Erythromycin, Erythromycin base, " Methen-m.blue-s.phos-phsal-hyo, Naproxen, Penicillins, Prednisone, Pseudoephedrine, Tetracycline, Cephalosporins, and Red dye    Inpatient Medications:  Scheduled medications   Medication Dose Route Frequency    allopurinol  200 mg oral Daily    aspirin  81 mg oral Once    ergocalciferol  1,250 mcg oral q30 days    ferrous sulfate (325 mg ferrous sulfate)  325 mg oral Daily with breakfast    folic acid  1 mg oral Daily    gabapentin  300 mg oral Nightly    heparin (porcine)        levothyroxine  50 mcg oral Daily    [START ON 2/16/2025] methotrexate  5 mg oral Every Sunday    pantoprazole  40 mg oral Daily before breakfast    polyethylene glycol  17 g oral Daily    ticagrelor  180 mg oral Once    torsemide  20 mg oral Daily     PRN medications   Medication    acetaminophen    aspirin    heparin (porcine)    heparin (porcine)     Continuous Medications   Medication Dose Last Rate     Outpatient Medications:  Current Outpatient Medications   Medication Instructions    acetaminophen (TYLENOL) 650 mg, Every 6 hours PRN    allopurinol (ZYLOPRIM) 200 mg, oral, Daily    ergocalciferol (VITAMIN D-2) 1,250 mcg, oral, Every 30 days, Taking 2 times a month    estradiol (ESTRACE) 1 g, vaginal, 2 times weekly    ferrous sulfate 65 mg, Daily with breakfast    folic acid (FOLVITE) 1 mg, oral, Daily    gabapentin (NEURONTIN) 300 mg, oral, Nightly    levothyroxine (SYNTHROID, LEVOXYL) 50 mcg, Daily    methotrexate (TREXALL) 20 mg, oral, Once Weekly, Follow directions carefully, and ask to explain any part you do not understand. Take exactly as directed.    omeprazole OTC (PRILOSEC OTC) 20 mg, Daily before breakfast    polyethylene glycol (GLYCOLAX, MIRALAX) 17 g, Daily    potassium chloride CR (Klor-Con) 10 mEq ER tablet 10 mEq, 3 times daily    torsemide (DEMADEX) 20 mg, oral, Daily       Physical Exam:  Neck:  Normal jugular venous pressure, carotid upstrokes brisk with no bruits.  She was in a modest amount of chest  discomfort  Lungs:  Clear to auscultation without wheezing or rhonchi or rales  Heart:  Regular rate and rhythm normal S1 and S2, no murmurs gallops rubs or clicks, PMI normal, no RV lift  Abdomen:  Soft, good bowel sounds, nontender, no hepatosplenomegaly, no pulsatile mass or bruits.  Extremities:  Good radial, femoral, pulses without pretibial edema and her pedal pulses were well dopplered bilaterally  Neurological:  No focal sensory or motor deficits, pupils equal and reactive     Assessment/Plan   #1.  Inferior wall STEMI    2.  Remote smoker    3.  Rheumatoid arthritis with chronic methotrexate therapy    Code Status:  Full Code    2/15: She will be managed with guideline directed medical therapy including low-dose aspirin 81 mg daily, Brilinta 90 mg twice daily, and moderate-dose statin therapy if she can tolerate this.  We can add Zetia at a later date if beneficial based on her lipid profile.  Echocardiography will be performed today .  Defer beta-blocker therapy in the setting of rate controlled atrial fibrillation and marginal blood pressures in the 90s to low 100s systolic at this time.        Robin Enriquez, DO

## 2025-02-15 NOTE — CARE PLAN
"  Problem: Pain - Adult  Goal: Verbalizes/displays adequate comfort level or baseline comfort level  Outcome: Progressing  Flowsheets (Taken 2/15/2025 1614)  Verbalizes/displays adequate comfort level or baseline comfort level: Encourage patient to monitor pain and request assistance     Problem: Safety - Adult  Goal: Free from fall injury  Outcome: Progressing  Flowsheets (Taken 2/15/2025 1614)  Free from fall injury: Instruct family/caregiver on patient safety     Problem: Skin  Goal: Prevent/manage excess moisture  Outcome: Progressing  Flowsheets (Taken 2/15/2025 1614)  Prevent/manage excess moisture: Moisturize dry skin  Goal: Prevent/minimize sheer/friction injuries  Outcome: Progressing  Flowsheets (Taken 2/15/2025 1614)  Prevent/minimize sheer/friction injuries: Increase activity/out of bed for meals  Goal: Promote/optimize nutrition  Outcome: Progressing  Flowsheets (Taken 2/15/2025 1614)  Promote/optimize nutrition: Consume > 50% meals/supplements   The patient's goals for the shift include \"have no chest pain or SOB\"    The clinical goals for the shift include Patient will be free from CP throughout this shift      "

## 2025-02-15 NOTE — ED PROVIDER NOTES
EMERGENCY DEPARTMENT ENCOUNTER      Pt Name: Michell Medrano  MRN: [id number]  Birthdate 1935  Date of evaluation: 2/14/2025  ED Provider: Anh York DO     CHIEF COMPLAINT     No chief complaint on file.      HISTORY OF PRESENT ILLNESS    Michell Medrano is a 89 y.o. who presents to the emergency department via EMS with an ST elevation MI.  She is in her previous state of health.  She was eating dinner when she started to feel a discomfort in her throat.  She thought it was an allergic reaction and called EMS.  EMS performed an ECG that showed ST elevation in the inferior leads with reciprocal in the lateral leads.  She then began to complain of chest pain.  She has a history of atrial fibrillation but no history of CAD.  She complains of some slight shortness of breath and nausea.  No other acute complaints.    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    Generalized osteoarthritis       SURGICAL HISTORY       Past Surgical History:   Procedure Laterality Date    BREAST LUMPECTOMY  2000    OTHER SURGICAL HISTORY  12/05/2022    Lumpectomy    OTHER SURGICAL HISTORY  12/05/2022    Knee replacement    TOTAL ABDOMINAL HYSTERECTOMY W/ BILATERAL SALPINGOOPHORECTOMY  12/10/2014    Total Abdominal Hysterectomy With Removal Of Both Ovaries    TOTAL HIP ARTHROPLASTY  01/15/2014    Hip Replacement       CURRENT MEDICATIONS       Current Discharge Medication List        CONTINUE these medications which have NOT CHANGED    Details   acetaminophen (Tylenol) 325 mg tablet Take 2 tablets (650 mg) by mouth every 6 hours if needed.       allopurinol (Zyloprim) 100 mg tablet Take 2 tablets (200 mg) by mouth once daily.  Qty: 90 tablet, Refills: 3    Associated Diagnoses: Gout, unspecified cause, unspecified chronicity, unspecified site      clotrimazole-betamethasone (Lotrisone) cream Apply 1 Application topically 2 times a day as needed (burning).  Qty: 30 g, Refills: 2    Associated Diagnoses: Labial irritation      ergocalciferol (Vitamin D-2) 1.25 MG (42858 UT) capsule Take 1 capsule (1,250 mcg) by mouth every 30 (thirty) days. Taking 2 times a month  Qty: 24 capsule, Refills: 1    Associated Diagnoses: Vitamin D deficiency      estradiol (Estrace) 0.01 % (0.1 mg/gram) vaginal cream Insert 0.25 Applicatorfuls (1 g) into the vagina 2 times a week.  Qty: 42.5 g, Refills: 3    Associated Diagnoses: Vaginal atrophy      ferrous sulfate 325 (65 Fe) MG EC tablet Take 65 mg by mouth once daily with breakfast. Do not crush, chew, or split.      folic acid (Folvite) 1 mg tablet Take 1 tablet (1 mg) by mouth once daily.    Associated Diagnoses: Rheumatoid arthritis, involving unspecified site, unspecified whether rheumatoid factor present (Multi)      gabapentin (Neurontin) 300 mg capsule Take 1 capsule (300 mg) by mouth once daily at bedtime.  Qty: 90 capsule, Refills: 3    Associated Diagnoses: Pain      levothyroxine (Synthroid, Levoxyl) 50 mcg tablet Take 1 tablet (50 mcg) by mouth early in the morning.. Take on an empty stomach at the same time each day, either 30 to 60 minutes prior to breakfast      methotrexate (Trexall) 2.5 mg tablet Take 8 tablets (20 mg total) by mouth 1 (one) time per week.  Follow directions carefully, and ask to explain any part you do not understand. Take exactly as directed.  Qty: 96 tablet, Refills: 3    Associated Diagnoses: Rheu arthritis mult site w involv of organs and systems (Multi)      omeprazole OTC (PriLOSEC OTC) 20 mg EC tablet Take 1 tablet (20 mg) by mouth once daily in the morning. Take before meals. Do not  crush, chew, or split.      polyethylene glycol (Glycolax, Miralax) 17 gram packet Take 17 g by mouth once daily.      potassium chloride CR (Klor-Con) 10 mEq ER tablet Take 1 tablet (10 mEq) by mouth 3 times a day.      torsemide (Demadex) 10 mg tablet Take 2 tablets (20 mg) by mouth once daily.  Qty: 180 tablet, Refills: 3             ALLERGIES     Apixaban, Xarelto [rivaroxaban], Doxycycline, Erythromycin, Erythromycin base, Methen-m.blue-s.phos-phsal-hyo, Naproxen, Penicillins, Prednisone, Pseudoephedrine, Tetracycline, Cephalosporins, and Red dye    FAMILY HISTORY     No family history on file.     SOCIAL HISTORY       Social History     Socioeconomic History    Marital status:    Tobacco Use    Smoking status: Never    Smokeless tobacco: Never   Vaping Use    Vaping status: Never Used   Substance and Sexual Activity    Alcohol use: Not Currently    Drug use: Never     Social Drivers of Health     Financial Resource Strain: Low Risk  (11/21/2024)    Overall Financial Resource Strain (CARDIA)     Difficulty of Paying Living Expenses: Not hard at all   Food Insecurity: No Food Insecurity (11/21/2024)    Hunger Vital Sign     Worried About Running Out of Food in the Last Year: Never true     Ran Out of Food in the Last Year: Never true   Transportation Needs: No Transportation Needs (11/22/2024)    PRAPARE - Transportation     Lack of Transportation (Medical): No     Lack of Transportation (Non-Medical): No   Intimate Partner Violence: Not At Risk (11/21/2024)    Humiliation, Afraid, Rape, and Kick questionnaire     Fear of Current or Ex-Partner: No     Emotionally Abused: No     Physically Abused: No     Sexually Abused: No   Housing Stability: Low Risk  (11/22/2024)    Housing Stability Vital Sign     Unable to Pay for Housing in the Last Year: No     Number of Times Moved in the Last Year: 1     Homeless in the Last Year: No       PHYSICAL EXAM       ED Triage Vitals   Temp Heart Rate Resp BP   02/14/25  2132 02/14/25 2124 02/14/25 2124 02/14/25 2130   36.7 °C (98.1 °F) 60 19 (!) 107/48      SpO2 Temp Source Heart Rate Source Patient Position   02/14/25 2130 02/14/25 2132 -- --   (!) 93 % Oral        BP Location FiO2 (%)     -- --              General: Appears nontoxic, slightly pale in mild distress, alert  Head: Head atraumatic; normocephalic  Eyes: normal inspection; no icterus  ENT: mucosa moist without lesion  Neck: Normal inspection, no meningeal signs  Resp: Normal breath sounds, no wheeze or crackles; No respiratory distress  Chest Wall: no tenderness or deformity  Heart: Heart rate bradycardic and rhythm regular; No Murmurs  Abdomen: Soft, Non-tender; No distention, guarding, rigidity, or rebound  MSK: Normal appearance; Moves all extremities; No Pedal edema  Neuro: Alert; no focal deficits, moves all extremities  Psych: Mood and Affect normal  Skin: Color appropriate; warm; Dry    DIAGNOSTIC RESULTS   Lab and radiology results are independently interpreted unless noted below.  RADIOLOGY (Per Emergency Physician):     Interpretation per the Radiologist below, if available at the time of this note:  XR chest 1 view   Final Result   1.  No radiographic evidence of acute cardiopulmonary process.                  MACRO:   None.        Signed by: Aspen Burrows 2/14/2025 10:14 PM   Dictation workstation:   [id number]      Cardiac Catheterization Procedure    (Results Pending)       LABS:  Abnormal Labs Reviewed   CBC WITH AUTO DIFFERENTIAL - Abnormal; Notable for the following components:       Result Value    RBC 2.68 (*)     Hemoglobin 8.8 (*)     Hematocrit 27.7 (*)      (*)     MCHC 31.8 (*)     RDW 17.6 (*)     All other components within normal limits   COMPREHENSIVE METABOLIC PANEL - Abnormal; Notable for the following components:    Glucose 195 (*)     Sodium 135 (*)     Bicarbonate 19 (*)     Urea Nitrogen 40 (*)     Creatinine 1.41 (*)     eGFR 36 (*)     Total Protein 6.3 (*)     AST 57 (*)      All other components within normal limits   TROPONIN I, HIGH SENSITIVITY - Abnormal; Notable for the following components:    Troponin I, High Sensitivity 374 (*)     All other components within normal limits    Narrative:     Less than 99th percentile of normal range cutoff-  Female and children under 18 years old <14 ng/L; Male <21 ng/L: Negative  Repeat testing should be performed if clinically indicated.     Female and children under 18 years old 14-50 ng/L; Male 21-50 ng/L:  Consistent with possible cardiac damage and possible increased clinical   risk. Serial measurements may help to assess extent of myocardial damage.     >50 ng/L: Consistent with cardiac damage, increased clinical risk and  myocardial infarction. Serial measurements may help assess extent of   myocardial damage.      NOTE: Children less than 1 year old may have higher baseline troponin   levels and results should be interpreted in conjunction with the overall   clinical context.     NOTE: Troponin I testing is performed using a different   testing methodology at [facility] than at other   [facility]. Direct result comparisons should only   be made within the same method.       All other labs were within normal range or not returned as of this dictation.    EKG:  Personally interpreted by Anh York, DO  2121: Atrial fibrillation with a slow ventricular response rate of 58 ST elevation in leads II, 3, aVF which shows reciprocal and leads I and aVL consistent with inferior ST elevation MI    EMERGENCY DEPARTMENT COURSE/MDM   Patient presents with an acute ST elevation MI.  EMS provided aspirin.  She is given a dose of heparin however Brilinta is held off per cardiology recommendation.  Similar it is immediately contacted.  Case discussed with the cardiologist.  Patient is updated on her findings and is taken to the Cath Lab in emergent fashion.    Diagnoses as of 02/14/25 2244   ST elevation myocardial infarction  (STEMI), unspecified artery (Multi)       Meds Administered:  Medications   heparin (porcine) injection 5,000 unit/mL  - Omnicell Override Pull (has no administration in time range)   heparin (porcine) injection (4,000 Units intravenous Given 2/14/25 2123)   aspirin chewable tablet (324 mg oral Given 2/14/25 2126)   midazolam (Versed) injection (1 mg intravenous Given 2/14/25 2213)   fentaNYL PF (Sublimaze) injection (25 mcg intravenous Given 2/14/25 2213)   lidocaine PF (Xylocaine) 10 mg/mL (1 %) injection (10 mL subcutaneous Given 2/14/25 2218)   heparin 1,000 unit/mL injection (2,000 Units intravenous Given 2/14/25 2234)   ondansetron (Zofran) injection (4 mg intravenous Given 2/14/25 2242)   nitroglycerin (Nitrostat) SL tablet 0.4 mg (0.4 mg sublingual Given 2/14/25 2151)       PROCEDURES   Unless otherwise noted below, none  Critical Care    Performed by: Anh York DO  Authorized by: Anh York DO    Critical care provider statement:     Critical care time (minutes):  31    Critical care time was exclusive of:  Separately billable procedures and treating other patients    Critical care was necessary to treat or prevent imminent or life-threatening deterioration of the following conditions: STEMI.    Critical care was time spent personally by me on the following activities:  Ordering and review of laboratory studies, ordering and performing treatments and interventions, ordering and review of radiographic studies, pulse oximetry, re-evaluation of patient's condition, review of old charts, obtaining history from patient or surrogate, examination of patient, evaluation of patient's response to treatment, discussions with consultants and development of treatment plan with patient or surrogate    Care discussed with: admitting provider          FINAL IMPRESSION      1. ST elevation myocardial infarction (STEMI), unspecified artery (Multi)    2. STEMI (ST elevation myocardial infarction) (Multi)           DISPOSITION    Admit 02/14/2025 09:26:05 PM  As a result of their workup, the patient will require admission to the hospital.  The patient was informed of her diagnosis.  The patient was given the opportunity to ask questions and I answered them. The patient agreed to be admitted to the hospital.    Critical Care time: See Procedure Note    (Comment: Please note this report has been produced using speech recognition software and may contain errors related to that system including errors in grammar, punctuation, and spelling, as well as words and phrases that may be inappropriate.  If there are any questions or concerns please feel free to contact the dictating provider for clarification.)    Anh York DO (electronically signed)  Emergency Medicine Physician    History provided by: Patient and EMS  Limitations to History: None  External Records Reviewed with Brief Summary:  Prior cardiology review including a nuclear SPECT on 10/24 as well as recent admission for shortness of breath and throat irritation in 11/24  Social Determinants of Health which Significantly Impact Care: None identified   EKG Independent Interpretation: EKG interpreted by myself. Please see ED Course for full interpretation.  Independent Result Review and Interpretation: Relevant laboratory and radiographic results were reviewed and independently interpreted by myself.  As necessary, they are commented on in the ED Course.  Chronic conditions affecting the patient's care: As documented above in MDM  The patient was discussed with the following consultants/services:  Dr Enriquez, interventional cardiology  Care Considerations: As documented above in MDM               Anh York DO  02/14/25 [id number]

## 2025-02-15 NOTE — CARE PLAN
"  Problem: Pain - Adult  Goal: Verbalizes/displays adequate comfort level or baseline comfort level  Outcome: Progressing     Problem: Safety - Adult  Goal: Free from fall injury  Outcome: Progressing     Problem: Discharge Planning  Goal: Discharge to home or other facility with appropriate resources  Outcome: Progressing     Problem: Chronic Conditions and Co-morbidities  Goal: Patient's chronic conditions and co-morbidity symptoms are monitored and maintained or improved  Outcome: Progressing     Problem: Nutrition  Goal: Nutrient intake appropriate for maintaining nutritional needs  Outcome: Progressing     Problem: Skin  Goal: Prevent/manage excess moisture  Outcome: Progressing  Goal: Prevent/minimize sheer/friction injuries  Outcome: Progressing  Goal: Promote/optimize nutrition  Outcome: Progressing   The patient's goals for the shift include \"have no chest pain or SOB\"    The clinical goals for the shift include remain hemodynamically stable, remain free of chest pain & SOB, have no s/s of bleeding post cath    Over the shift, the patient did make progress toward the following goals.  "

## 2025-02-15 NOTE — PROGRESS NOTES
Spiritual Care Visit  Spiritual Care Request    Reason for Visit:  Routine Visit: Introduction     Request Received From:       Focus of Care:  Visited With: Patient         Refer to :          Spiritual Care Assessment    Spiritual Assessment:                      Care Provided:       Sense of Community and or Latter day Affiliation:  Holiness   Values/Beliefs  Spiritual Requests During Hospitalization: I gave Michell a osmaning today     Addressed Needs/Concerns and/or Anibal Through:          Outcome:        Advance Directives:         Spiritual Care Annotation    Annotation:  I gave Michell a jose today.  Jose Borja

## 2025-02-16 VITALS
SYSTOLIC BLOOD PRESSURE: 105 MMHG | HEART RATE: 73 BPM | WEIGHT: 123.02 LBS | RESPIRATION RATE: 18 BRPM | HEIGHT: 64 IN | TEMPERATURE: 97.3 F | OXYGEN SATURATION: 98 % | DIASTOLIC BLOOD PRESSURE: 44 MMHG | BODY MASS INDEX: 21 KG/M2

## 2025-02-16 LAB
ANION GAP SERPL CALCULATED.3IONS-SCNC: 9 MMOL/L (ref 10–20)
BASOPHILS # BLD AUTO: 0.02 X10*3/UL (ref 0–0.1)
BASOPHILS NFR BLD AUTO: 0.5 %
BUN SERPL-MCNC: 26 MG/DL (ref 6–23)
CALCIUM SERPL-MCNC: 8.3 MG/DL (ref 8.6–10.3)
CHLORIDE SERPL-SCNC: 108 MMOL/L (ref 98–107)
CO2 SERPL-SCNC: 25 MMOL/L (ref 21–32)
CREAT SERPL-MCNC: 0.87 MG/DL (ref 0.5–1.05)
EGFRCR SERPLBLD CKD-EPI 2021: 64 ML/MIN/1.73M*2
EOSINOPHIL # BLD AUTO: 0.07 X10*3/UL (ref 0–0.4)
EOSINOPHIL NFR BLD AUTO: 1.8 %
ERYTHROCYTE [DISTWIDTH] IN BLOOD BY AUTOMATED COUNT: 17.6 % (ref 11.5–14.5)
GLUCOSE SERPL-MCNC: 89 MG/DL (ref 74–99)
HCT VFR BLD AUTO: 27.4 % (ref 36–46)
HGB BLD-MCNC: 9.1 G/DL (ref 12–16)
IMM GRANULOCYTES # BLD AUTO: 0.03 X10*3/UL (ref 0–0.5)
IMM GRANULOCYTES NFR BLD AUTO: 0.8 % (ref 0–0.9)
LYMPHOCYTES # BLD AUTO: 1.18 X10*3/UL (ref 0.8–3)
LYMPHOCYTES NFR BLD AUTO: 30.6 %
MCH RBC QN AUTO: 32.4 PG (ref 26–34)
MCHC RBC AUTO-ENTMCNC: 33.2 G/DL (ref 32–36)
MCV RBC AUTO: 98 FL (ref 80–100)
MONOCYTES # BLD AUTO: 0.37 X10*3/UL (ref 0.05–0.8)
MONOCYTES NFR BLD AUTO: 9.6 %
NEUTROPHILS # BLD AUTO: 2.18 X10*3/UL (ref 1.6–5.5)
NEUTROPHILS NFR BLD AUTO: 56.7 %
NRBC BLD-RTO: 0 /100 WBCS (ref 0–0)
PLATELET # BLD AUTO: 197 X10*3/UL (ref 150–450)
POTASSIUM SERPL-SCNC: 3.9 MMOL/L (ref 3.5–5.3)
RBC # BLD AUTO: 2.81 X10*6/UL (ref 4–5.2)
SODIUM SERPL-SCNC: 138 MMOL/L (ref 136–145)
WBC # BLD AUTO: 3.9 X10*3/UL (ref 4.4–11.3)

## 2025-02-16 PROCEDURE — 2500000002 HC RX 250 W HCPCS SELF ADMINISTERED DRUGS (ALT 637 FOR MEDICARE OP, ALT 636 FOR OP/ED): Performed by: INTERNAL MEDICINE

## 2025-02-16 PROCEDURE — 2060000001 HC INTERMEDIATE ICU ROOM DAILY

## 2025-02-16 PROCEDURE — 99232 SBSQ HOSP IP/OBS MODERATE 35: CPT | Performed by: INTERNAL MEDICINE

## 2025-02-16 PROCEDURE — 80048 BASIC METABOLIC PNL TOTAL CA: CPT | Performed by: NURSE PRACTITIONER

## 2025-02-16 PROCEDURE — 36415 COLL VENOUS BLD VENIPUNCTURE: CPT | Performed by: NURSE PRACTITIONER

## 2025-02-16 PROCEDURE — 85025 COMPLETE CBC W/AUTO DIFF WBC: CPT | Performed by: NURSE PRACTITIONER

## 2025-02-16 PROCEDURE — 2500000004 HC RX 250 GENERAL PHARMACY W/ HCPCS (ALT 636 FOR OP/ED): Performed by: NURSE PRACTITIONER

## 2025-02-16 PROCEDURE — 2500000001 HC RX 250 WO HCPCS SELF ADMINISTERED DRUGS (ALT 637 FOR MEDICARE OP): Performed by: NURSE PRACTITIONER

## 2025-02-16 PROCEDURE — 2500000002 HC RX 250 W HCPCS SELF ADMINISTERED DRUGS (ALT 637 FOR MEDICARE OP, ALT 636 FOR OP/ED): Performed by: NURSE PRACTITIONER

## 2025-02-16 RX ADMIN — TICAGRELOR 90 MG: 90 TABLET ORAL at 20:40

## 2025-02-16 RX ADMIN — ATORVASTATIN CALCIUM 20 MG: 20 TABLET, FILM COATED ORAL at 20:40

## 2025-02-16 RX ADMIN — FOLIC ACID 1 MG: 1 TABLET ORAL at 08:59

## 2025-02-16 RX ADMIN — LEVOTHYROXINE SODIUM 50 MCG: 0.05 TABLET ORAL at 05:15

## 2025-02-16 RX ADMIN — PANTOPRAZOLE SODIUM 40 MG: 40 TABLET, DELAYED RELEASE ORAL at 15:02

## 2025-02-16 RX ADMIN — POLYETHYLENE GLYCOL 3350 17 G: 17 POWDER, FOR SOLUTION ORAL at 08:58

## 2025-02-16 RX ADMIN — ALLOPURINOL 200 MG: 100 TABLET ORAL at 08:59

## 2025-02-16 RX ADMIN — FERROUS SULFATE TAB 325 MG (65 MG ELEMENTAL FE) 325 MG: 325 (65 FE) TAB at 20:40

## 2025-02-16 RX ADMIN — HEPARIN SODIUM 5000 UNITS: 5000 INJECTION, SOLUTION INTRAVENOUS; SUBCUTANEOUS at 15:01

## 2025-02-16 RX ADMIN — GABAPENTIN 300 MG: 300 CAPSULE ORAL at 20:40

## 2025-02-16 RX ADMIN — ASPIRIN 81 MG: 81 TABLET, CHEWABLE ORAL at 08:59

## 2025-02-16 RX ADMIN — TICAGRELOR 90 MG: 90 TABLET ORAL at 08:59

## 2025-02-16 ASSESSMENT — COGNITIVE AND FUNCTIONAL STATUS - GENERAL
MOBILITY SCORE: 20
MOVING TO AND FROM BED TO CHAIR: A LITTLE
WALKING IN HOSPITAL ROOM: A LITTLE
HELP NEEDED FOR BATHING: A LITTLE
DAILY ACTIVITIY SCORE: 24
MOBILITY SCORE: 23
CLIMB 3 TO 5 STEPS WITH RAILING: A LITTLE
STANDING UP FROM CHAIR USING ARMS: A LITTLE
DAILY ACTIVITIY SCORE: 22
CLIMB 3 TO 5 STEPS WITH RAILING: A LITTLE
DRESSING REGULAR LOWER BODY CLOTHING: A LITTLE

## 2025-02-16 ASSESSMENT — PAIN SCALES - GENERAL
PAINLEVEL_OUTOF10: 0 - NO PAIN
PAINLEVEL_OUTOF10: 0 - NO PAIN

## 2025-02-16 ASSESSMENT — PAIN - FUNCTIONAL ASSESSMENT: PAIN_FUNCTIONAL_ASSESSMENT: 0-10

## 2025-02-16 NOTE — CARE PLAN
"The patient's goals for the shift include \"have no chest pain or SOB\"    The clinical goals for the shift include remain hemodynamically stable throughout shift    Over the shift, the patient did not make progress toward the following goals. Barriers to progression include previous heart cath with intervention prompted by chest pain and SOB. Recommendations to address these barriers include monitoring for CP and SOB and monitoring telemetry for ectopy.    "

## 2025-02-16 NOTE — PROGRESS NOTES
Subjective Data:  89 y.o. female presenting with chest pain and an ischemic EKG consistent with an inferior wall STEMI. Day 2    Overnight Events:    none     Objective Data:  Last Recorded Vitals:  Vitals:    02/16/25 0433 02/16/25 0512 02/16/25 0752 02/16/25 1142   BP: 107/71  122/50 122/53   BP Location: Right arm  Right arm Right arm   Patient Position: Lying  Lying Lying   Pulse: 73      Resp: 18  18 16   Temp: 36.7 °C (98.1 °F)  36.4 °C (97.5 °F) 36.4 °C (97.5 °F)   TempSrc: Temporal  Temporal Oral   SpO2: 98%  98% 96%   Weight:  55.8 kg (123 lb 0.3 oz)     Height:           Last Labs:  CBC - 2/16/2025:  5:15 AM  3.9 9.1 197    27.4      CMP - 2/16/2025:  5:15 AM  8.3 6.3 57 --- 0.6   4.0 3.6 43 115      PTT - 2/12/2025:  2:26 PM  1.2   12.4 30     TROPHS   Date/Time Value Ref Range Status   02/14/2025 09:28  0 - 13 ng/L Final   11/22/2024 09:06 AM 23 0 - 13 ng/L Final   11/21/2024 02:57 PM 16 0 - 13 ng/L Final     HGBA1C   Date/Time Value Ref Range Status   02/15/2025 04:09 AM 6.0 See comment % Final   03/14/2019 01:06 PM 5.4 % Final     Comment:          Diagnosis of Diabetes-Adults   Non-Diabetic: < or = 5.6%   Increased risk for developing diabetes: 5.7-6.4%   Diagnostic of diabetes: > or = 6.5%  .       Monitoring of Diabetes                Age (y)     Therapeutic Goal (%)   Adults:          >18           <7.0   Pediatrics:    13-18           <7.5                   7-12           <8.0                   0- 6            7.5-8.5   American Diabetes Association. Diabetes Care 33(S1), Jan 2010.       LDLCALC   Date/Time Value Ref Range Status   02/15/2025 04:09 AM 83 <=99 mg/dL Final     Comment:                                 Near   Borderline      AGE      Desirable  Optimal    High     High     Very High     0-19 Y     0 - 109     ---    110-129   >/= 130     ----    20-24 Y     0 - 119     ---    120-159   >/= 160     ----      >24 Y     0 -  99   100-129  130-159   160-189     >/=190     03/25/2024  10:16  <=99 mg/dL Final     Comment:                                 Near   Borderline      AGE      Desirable  Optimal    High     High     Very High     0-19 Y     0 - 109     ---    110-129   >/= 130     ----    20-24 Y     0 - 119     ---    120-159   >/= 160     ----      >24 Y     0 -  99   100-129  130-159   160-189     >/=190       VLDL   Date/Time Value Ref Range Status   02/15/2025 04:09 AM 17 0 - 40 mg/dL Final   03/25/2024 10:16 AM 23 0 - 40 mg/dL Final   03/22/2023 06:06 PM 24 0 - 40 mg/dL Final   03/24/2022 10:20 AM 24 0 - 40 mg/dL Final   03/17/2021 11:34 AM 23 0 - 40 mg/dL Final      Last I/O:  I/O last 3 completed shifts:  In: 1352.8 (24.2 mL/kg) [P.O.:1350; I.V.:2.8 (0 mL/kg)]  Out: 10 (0.2 mL/kg) [Blood:10]  Weight: 55.8 kg     Past Cardiology Tests (Last 3 Years):  EKG:  ECG 12 lead 11/21/2024      ECG 12 lead 09/10/2024    Echo:  Transthoracic Echo Complete 02/15/2025    Ejection Fractions:  EF   Date/Time Value Ref Range Status   02/15/2025 10:28 AM 58 %      Cath:  No results found for this or any previous visit from the past 1095 days.    Stress Test:  Nuclear Stress Test 10/15/2024    Cardiac Imaging:  No results found for this or any previous visit from the past 1095 days.      Inpatient Medications:  Scheduled medications   Medication Dose Route Frequency    allopurinol  200 mg oral Daily    aspirin  81 mg oral Daily    atorvastatin  20 mg oral Nightly    ergocalciferol  1,250 mcg oral q30 days    ferrous sulfate (325 mg ferrous sulfate)  325 mg oral Daily with breakfast    folic acid  1 mg oral Daily    gabapentin  300 mg oral Nightly    heparin (porcine)  5,000 Units subcutaneous q8h [facility]    levothyroxine  50 mcg oral Daily    methotrexate  20 mg oral Every Sunday    pantoprazole  40 mg oral Daily before breakfast    polyethylene glycol  17 g oral Daily    ticagrelor  90 mg oral BID    [Held by provider] torsemide  20 mg oral Daily     PRN medications   Medication    acetaminophen     aspirin    heparin (porcine)     Continuous Medications   Medication Dose Last Rate       Physical Exam:  Neck:  Normal jugular venous pressure,   Lungs:  Clear to auscultation without wheezing or rhonchi or rales  Heart: Irregular with variable S1   Abdomen:  Soft, good bowel sounds, nontender,   Extremities:  Good radial, femoral,  pulses without pretibial edema, right groin well-healed cath site,  Neurological:  No focal sensory or motor deficits,        Assessment/Plan   an  #1.  Inferior wall STEMI     2.  Remote smoker     3.  Rheumatoid arthritis with chronic methotrexate therapy     Code Status:  Full Code     2/14: She will be managed with guideline directed medical therapy including low-dose aspirin 81 mg daily, Brilinta 90 mg twice daily, and moderate-dose statin therapy if she can tolerate this.  We can add Zetia at a later date if beneficial based on her lipid profile.  Echocardiography will be performed today .  Defer beta-blocker therapy in the setting of rate controlled atrial fibrillation and marginal blood pressures in the 90s to low 100s systolic at this time.     2/15: Remains angina free, stable hemodynamics.  Her blood pressure is 130/66, pulse 70, respirations 13, O2 sat 100% on room air and her blood sugar is normal.  She has chronic anemia and labile hemoglobins, and her hemoglobin was 11.5 on 2/12/2025, 8.8 last night, 9.4 this morning.  Platelet count normal at 209K remains in atrial fibrillation with good rate control.  Echocardiogram is pending    2/16: Comfortably eating breakfast, angina free and breathing easily.  Blood pressure 122/53, pulse is 73, respirations 16, O2 sat 96% on room air and afebrile  Right groin cardiac catheterization site is healing very well without hematoma and minimal discomfort.  Her echocardiogram showed normal LV systolic function without segmental wall motion abnormality.  Dissipate discharge home tomorrow    With good LV systolic function, absence of  residual coronary artery disease and marginally low normal blood pressures I have decided to avoid beta-blocker therapy at this time.    Code Status:  Full Code            Robin Enriquez, DO

## 2025-02-16 NOTE — NURSING NOTE
Assumed care of patient.  Patient in bed resting.   Bedside shift report received.  Heart monitor showing SR with HR of 66.  No c/o pain or discomfort at this time.   Call light in reach.

## 2025-02-17 ENCOUNTER — PHARMACY VISIT (OUTPATIENT)
Dept: PHARMACY | Facility: CLINIC | Age: 89
End: 2025-02-17
Payer: MEDICARE

## 2025-02-17 VITALS
HEART RATE: 76 BPM | BODY MASS INDEX: 20.63 KG/M2 | DIASTOLIC BLOOD PRESSURE: 59 MMHG | WEIGHT: 120.81 LBS | SYSTOLIC BLOOD PRESSURE: 141 MMHG | HEIGHT: 64 IN | OXYGEN SATURATION: 99 % | TEMPERATURE: 98.1 F | RESPIRATION RATE: 18 BRPM

## 2025-02-17 LAB
ANION GAP SERPL CALCULATED.3IONS-SCNC: 8 MMOL/L (ref 10–20)
BASOPHILS # BLD AUTO: 0.04 X10*3/UL (ref 0–0.1)
BASOPHILS NFR BLD AUTO: 1 %
BUN SERPL-MCNC: 22 MG/DL (ref 6–23)
CALCIUM SERPL-MCNC: 8.6 MG/DL (ref 8.6–10.3)
CELL COUNT (BLOOD): 5.8 X10*3/UL
CELL POPULATIONS: NORMAL
CHLORIDE SERPL-SCNC: 109 MMOL/L (ref 98–107)
CO2 SERPL-SCNC: 24 MMOL/L (ref 21–32)
CREAT SERPL-MCNC: 0.79 MG/DL (ref 0.5–1.05)
CYTOGENETICS/MOLECULAR TEST ORDERED: YES
DIAGNOSIS: NORMAL
EGFRCR SERPLBLD CKD-EPI 2021: 72 ML/MIN/1.73M*2
EOSINOPHIL # BLD AUTO: 0.11 X10*3/UL (ref 0–0.4)
EOSINOPHIL NFR BLD AUTO: 2.8 %
ERYTHROCYTE [DISTWIDTH] IN BLOOD BY AUTOMATED COUNT: 17.7 % (ref 11.5–14.5)
FLOW DIFFERENTIAL: NORMAL
FLOW TEST ORDERED: NORMAL
GLUCOSE SERPL-MCNC: 91 MG/DL (ref 74–99)
HCT VFR BLD AUTO: 27.5 % (ref 36–46)
HGB BLD-MCNC: 9 G/DL (ref 12–16)
IMM GRANULOCYTES # BLD AUTO: 0.03 X10*3/UL (ref 0–0.5)
IMM GRANULOCYTES NFR BLD AUTO: 0.8 % (ref 0–0.9)
LAB TEST METHOD: NORMAL
LYMPHOCYTES # BLD AUTO: 1.25 X10*3/UL (ref 0.8–3)
LYMPHOCYTES NFR BLD AUTO: 32.3 %
MCH RBC QN AUTO: 32.8 PG (ref 26–34)
MCHC RBC AUTO-ENTMCNC: 32.7 G/DL (ref 32–36)
MCV RBC AUTO: 100 FL (ref 80–100)
MONOCYTES # BLD AUTO: 0.38 X10*3/UL (ref 0.05–0.8)
MONOCYTES NFR BLD AUTO: 9.8 %
NEUTROPHILS # BLD AUTO: 2.06 X10*3/UL (ref 1.6–5.5)
NEUTROPHILS NFR BLD AUTO: 53.3 %
NRBC BLD-RTO: 0.5 /100 WBCS (ref 0–0)
NUMBER OF CELLS COLLECTED: NORMAL PER TUBE
PATH REPORT.TOTAL CANCER: NORMAL
PLATELET # BLD AUTO: 204 X10*3/UL (ref 150–450)
POTASSIUM SERPL-SCNC: 4.2 MMOL/L (ref 3.5–5.3)
RBC # BLD AUTO: 2.74 X10*6/UL (ref 4–5.2)
RBC MORPH BLD: NORMAL
SIGNATURE COMMENT: NORMAL
SODIUM SERPL-SCNC: 137 MMOL/L (ref 136–145)
SPECIMEN VIABILITY: NORMAL
WBC # BLD AUTO: 3.9 X10*3/UL (ref 4.4–11.3)
WBC MORPH BLD: NORMAL

## 2025-02-17 PROCEDURE — 85025 COMPLETE CBC W/AUTO DIFF WBC: CPT | Performed by: NURSE PRACTITIONER

## 2025-02-17 PROCEDURE — 2500000004 HC RX 250 GENERAL PHARMACY W/ HCPCS (ALT 636 FOR OP/ED): Performed by: NURSE PRACTITIONER

## 2025-02-17 PROCEDURE — 2500000002 HC RX 250 W HCPCS SELF ADMINISTERED DRUGS (ALT 637 FOR MEDICARE OP, ALT 636 FOR OP/ED): Performed by: NURSE PRACTITIONER

## 2025-02-17 PROCEDURE — 2500000001 HC RX 250 WO HCPCS SELF ADMINISTERED DRUGS (ALT 637 FOR MEDICARE OP): Performed by: NURSE PRACTITIONER

## 2025-02-17 PROCEDURE — RXMED WILLOW AMBULATORY MEDICATION CHARGE

## 2025-02-17 PROCEDURE — 82374 ASSAY BLOOD CARBON DIOXIDE: CPT | Performed by: NURSE PRACTITIONER

## 2025-02-17 PROCEDURE — 99239 HOSP IP/OBS DSCHRG MGMT >30: CPT | Performed by: INTERNAL MEDICINE

## 2025-02-17 PROCEDURE — 2500000001 HC RX 250 WO HCPCS SELF ADMINISTERED DRUGS (ALT 637 FOR MEDICARE OP): Performed by: INTERNAL MEDICINE

## 2025-02-17 PROCEDURE — 36415 COLL VENOUS BLD VENIPUNCTURE: CPT | Performed by: NURSE PRACTITIONER

## 2025-02-17 RX ORDER — CLOPIDOGREL BISULFATE 75 MG/1
300 TABLET ORAL ONCE
Status: DISCONTINUED | OUTPATIENT
Start: 2025-02-17 | End: 2025-02-17

## 2025-02-17 RX ORDER — CLOPIDOGREL BISULFATE 75 MG/1
75 TABLET ORAL DAILY
Status: DISCONTINUED | OUTPATIENT
Start: 2025-02-18 | End: 2025-02-17 | Stop reason: HOSPADM

## 2025-02-17 RX ORDER — NAPROXEN SODIUM 220 MG/1
81 TABLET, FILM COATED ORAL DAILY
Qty: 90 TABLET | Refills: 0 | Status: SHIPPED | OUTPATIENT
Start: 2025-02-18

## 2025-02-17 RX ORDER — IODIXANOL 320 MG/ML
INJECTION, SOLUTION INTRAVASCULAR AS NEEDED
Status: DISCONTINUED | OUTPATIENT
Start: 2025-02-14 | End: 2025-02-17 | Stop reason: HOSPADM

## 2025-02-17 RX ORDER — METOPROLOL SUCCINATE 25 MG/1
25 TABLET, EXTENDED RELEASE ORAL DAILY
Status: DISCONTINUED | OUTPATIENT
Start: 2025-02-17 | End: 2025-02-17 | Stop reason: HOSPADM

## 2025-02-17 RX ORDER — ATORVASTATIN CALCIUM 40 MG/1
40 TABLET, FILM COATED ORAL NIGHTLY
Qty: 90 TABLET | Refills: 0 | Status: SHIPPED | OUTPATIENT
Start: 2025-02-17

## 2025-02-17 RX ORDER — CLOPIDOGREL BISULFATE 75 MG/1
300 TABLET ORAL ONCE
Status: COMPLETED | OUTPATIENT
Start: 2025-02-17 | End: 2025-02-17

## 2025-02-17 RX ORDER — CLOPIDOGREL BISULFATE 75 MG/1
75 TABLET ORAL DAILY
Qty: 90 TABLET | Refills: 0 | Status: SHIPPED | OUTPATIENT
Start: 2025-02-18

## 2025-02-17 RX ORDER — METOPROLOL SUCCINATE 25 MG/1
25 TABLET, EXTENDED RELEASE ORAL DAILY
Qty: 90 TABLET | Refills: 0 | Status: SHIPPED | OUTPATIENT
Start: 2025-02-17

## 2025-02-17 RX ORDER — ATORVASTATIN CALCIUM 40 MG/1
40 TABLET, FILM COATED ORAL NIGHTLY
Status: DISCONTINUED | OUTPATIENT
Start: 2025-02-17 | End: 2025-02-17 | Stop reason: HOSPADM

## 2025-02-17 RX ADMIN — TICAGRELOR 90 MG: 90 TABLET ORAL at 09:13

## 2025-02-17 RX ADMIN — FOLIC ACID 1 MG: 1 TABLET ORAL at 09:13

## 2025-02-17 RX ADMIN — PANTOPRAZOLE SODIUM 40 MG: 40 TABLET, DELAYED RELEASE ORAL at 16:29

## 2025-02-17 RX ADMIN — METOPROLOL SUCCINATE 25 MG: 25 TABLET, EXTENDED RELEASE ORAL at 18:04

## 2025-02-17 RX ADMIN — ASPIRIN 81 MG: 81 TABLET, CHEWABLE ORAL at 09:12

## 2025-02-17 RX ADMIN — CLOPIDOGREL BISULFATE 300 MG: 75 TABLET ORAL at 18:10

## 2025-02-17 RX ADMIN — HEPARIN SODIUM 5000 UNITS: 5000 INJECTION, SOLUTION INTRAVENOUS; SUBCUTANEOUS at 11:13

## 2025-02-17 RX ADMIN — LEVOTHYROXINE SODIUM 50 MCG: 0.05 TABLET ORAL at 06:20

## 2025-02-17 RX ADMIN — ALLOPURINOL 200 MG: 100 TABLET ORAL at 09:12

## 2025-02-17 ASSESSMENT — COGNITIVE AND FUNCTIONAL STATUS - GENERAL
TOILETING: A LOT
TURNING FROM BACK TO SIDE WHILE IN FLAT BAD: A LITTLE
MOVING FROM LYING ON BACK TO SITTING ON SIDE OF FLAT BED WITH BEDRAILS: A LITTLE
MOBILITY SCORE: 14
DAILY ACTIVITIY SCORE: 14
HELP NEEDED FOR BATHING: A LOT
WALKING IN HOSPITAL ROOM: A LOT
DRESSING REGULAR LOWER BODY CLOTHING: A LOT
STANDING UP FROM CHAIR USING ARMS: A LOT
PERSONAL GROOMING: A LOT
CLIMB 3 TO 5 STEPS WITH RAILING: A LOT
MOVING TO AND FROM BED TO CHAIR: A LOT
DRESSING REGULAR UPPER BODY CLOTHING: A LOT

## 2025-02-17 ASSESSMENT — PAIN - FUNCTIONAL ASSESSMENT: PAIN_FUNCTIONAL_ASSESSMENT: 0-10

## 2025-02-17 ASSESSMENT — PAIN SCALES - GENERAL: PAINLEVEL_OUTOF10: 0 - NO PAIN

## 2025-02-17 NOTE — DISCHARGE INSTRUCTIONS

## 2025-02-17 NOTE — DISCHARGE SUMMARY
Discharge Diagnosis  ST elevation myocardial infarction (STEMI), unspecified artery (Multi)    Issues Requiring Follow-Up  Follow-up with Dr. Enriquez in 2 weeks.    Will refer also for cardiac rehab.        Test Results Pending At Discharge  Pending Labs       No current pending labs.            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  Patient Hospital course uncomplicated.  She has been complaining of slight shortness of breath likely secondary to interaction with the Brilinta.  Will switch to clopidogrel we will load her with 300 mg today.  I was told also by pharmacy no free trials of Brilinta and will cost will be $500 so we will avoid that by discharging her on clopidogrel 75 mg oral daily.      Pertinent Physical Exam At Time of Discharge  Physical Exam    Home Medications     Medication List      START taking these medications     aspirin 81 mg chewable tablet; Chew and swallow 1 tablet (81 mg) once   daily.; Start taking on: February 18, 2025   atorvastatin 40 mg tablet; Commonly known as: Lipitor; Take 1 tablet (40   mg) by mouth once daily at bedtime.   metoprolol succinate XL 25 mg 24 hr tablet; Commonly known as:   Toprol-XL; Take 1 tablet (25 mg) by mouth once daily. Do not crush or   chew.   ticagrelor 90 mg tablet; Commonly known as: Brilinta; Take 1 tablet (90   mg) by mouth 2 times a day.     CONTINUE taking these medications     acetaminophen 325 mg tablet; Commonly known as: Tylenol   allopurinol 100 mg tablet; Commonly known as: Zyloprim; Take 2 tablets   (200 mg) by mouth once daily.   ergocalciferol 1.25 MG (59556 UT) capsule; Commonly known as: Vitamin   D-2; Take 1 capsule (1,250 mcg) by mouth every 30 (thirty) days. Taking 2   times a month   estradiol 0.01 % (0.1 mg/gram) vaginal cream; Commonly known as:   Estrace; Insert 0.25 Applicatorfuls (1 g) into the vagina 2 times a  week.   ferrous sulfate 325 (65 Fe) MG EC tablet   folic acid 1 mg tablet; Commonly known as: Folvite; Take 1 tablet (1 mg)   by mouth once daily.   gabapentin 300 mg capsule; Commonly known as: Neurontin; Take 1 capsule   (300 mg) by mouth once daily at bedtime.   levothyroxine 50 mcg tablet; Commonly known as: Synthroid, Levoxyl   methotrexate 2.5 mg tablet; Commonly known as: Trexall; Take 8 tablets   (20 mg total) by mouth 1 (one) time per week.  Follow directions   carefully, and ask to explain any part you do not understand. Take exactly   as directed.   omeprazole OTC 20 mg EC tablet; Commonly known as: PriLOSEC OTC   polyethylene glycol 17 gram packet; Commonly known as: Glycolax, Miralax   torsemide 10 mg tablet; Commonly known as: Demadex; Take 2 tablets (20   mg) by mouth once daily.     STOP taking these medications     nitrofurantoin (macrocrystal-monohydrate) 100 mg capsule; Commonly known   as: Macrobid   potassium chloride CR 10 mEq ER tablet; Commonly known as: Klor-Con       Outpatient Follow-Up  Future Appointments   Date Time Provider Department Center   3/10/2025  2:30 PM Mary Hall APRN-CNP [id number] [facility]   4/2/2025 11:20 AM Prachi Lauren MD [id number] [facility]   4/11/2025  1:00 PM Lucille Fuentes PA-C [id number] [facility]   4/15/2025 10:30 AM Yamile Briggs PA-C [id number] [facility]   8/21/2025 11:45 AM Alexa Gross MD [id number] [facility]       Sivan Watts MD

## 2025-02-17 NOTE — CARE PLAN
"  Problem: Pain - Adult  Goal: Verbalizes/displays adequate comfort level or baseline comfort level  Outcome: Progressing     Problem: Safety - Adult  Goal: Free from fall injury  Outcome: Progressing     Problem: Discharge Planning  Goal: Discharge to home or other facility with appropriate resources  Outcome: Progressing     Problem: Chronic Conditions and Co-morbidities  Goal: Patient's chronic conditions and co-morbidity symptoms are monitored and maintained or improved  Outcome: Progressing     Problem: Nutrition  Goal: Nutrient intake appropriate for maintaining nutritional needs  Outcome: Progressing     Problem: Skin  Goal: Prevent/manage excess moisture  Outcome: Progressing  Goal: Prevent/minimize sheer/friction injuries  Outcome: Progressing  Goal: Promote/optimize nutrition  Outcome: Progressing     Problem: Fall/Injury  Goal: Not fall by end of shift  Outcome: Progressing  Goal: Be free from injury by end of the shift  Outcome: Progressing  Goal: Verbalize understanding of personal risk factors for fall in the hospital  Outcome: Progressing  Goal: Verbalize understanding of risk factor reduction measures to prevent injury from fall in the home  Outcome: Progressing  Goal: Use assistive devices by end of the shift  Outcome: Progressing  Goal: Pace activities to prevent fatigue by end of the shift  Outcome: Progressing   The patient's goals for the shift include \"have no chest pain or SOB\"    The clinical goals for the shift include no chest pain throughout shift.    Over the shift, the patient did not make progress toward the following goals. Barriers to progression include recent STEMI. Recommendations to address these barriers include monitor rhythm and PRN medications when needed.    "

## 2025-02-17 NOTE — NURSING NOTE
Patient is complaining of light epigastric pain that feels like reflux. She said it does not feel like it did when she came into the ED. Will continue to monitor.

## 2025-02-17 NOTE — PROGRESS NOTES
Spiritual Care Visit  Spiritual Care Request    Reason for Visit:  Routine Visit: Introduction     Request Received From:       Focus of Care:  Visited With: Patient         Refer to :          Spiritual Care Assessment    Spiritual Assessment:                      Care Provided:  Intended Effects: Build relationship of care and support, Demonstrate caring and concern, Promote sense of peace    Sense of Community and or Taoism Affiliation:  Zoroastrianism   Values/Beliefs  Spiritual Requests During Hospitalization: Michell asked to be anointed today.     Addressed Needs/Concerns and/or Anibal Through:     Sacramental Encounters  Sacrament of Sick-Anointing: Anointed    Outcome:        Advance Directives:         Spiritual Care Annotation    Annotation:  Michell asked to be anointed today.  Jose Borja

## 2025-02-17 NOTE — NURSING NOTE
Stent education/card/handout given to patient. Discussed importance of taking medication as prescribed/following up with physician appointments. STEMI education/handout given to patient. Discussed benefits of heart healthy diet/importance of maintaining a healthy weight. Discussed cardiac rehab. Patient encouraged to call cardiac rehab post discharge.

## 2025-02-17 NOTE — CARE PLAN
"The patient's goals for the shift include \"have no chest pain or SOB\"    The clinical goals for the shift include discharge      Problem: Pain - Adult  Goal: Verbalizes/displays adequate comfort level or baseline comfort level  Outcome: Progressing     Problem: Safety - Adult  Goal: Free from fall injury  Outcome: Progressing     Problem: Discharge Planning  Goal: Discharge to home or other facility with appropriate resources  Outcome: Progressing     Problem: Chronic Conditions and Co-morbidities  Goal: Patient's chronic conditions and co-morbidity symptoms are monitored and maintained or improved  Outcome: Progressing     Problem: Nutrition  Goal: Nutrient intake appropriate for maintaining nutritional needs  Outcome: Progressing     Problem: Skin  Goal: Prevent/manage excess moisture  Outcome: Progressing  Goal: Prevent/minimize sheer/friction injuries  Outcome: Progressing  Goal: Promote/optimize nutrition  Outcome: Progressing     Problem: Fall/Injury  Goal: Not fall by end of shift  Outcome: Progressing  Goal: Be free from injury by end of the shift  Outcome: Progressing  Goal: Verbalize understanding of personal risk factors for fall in the hospital  Outcome: Progressing  Goal: Verbalize understanding of risk factor reduction measures to prevent injury from fall in the home  Outcome: Progressing  Goal: Use assistive devices by end of the shift  Outcome: Progressing  Goal: Pace activities to prevent fatigue by end of the shift  Outcome: Progressing       "

## 2025-02-18 LAB
ATRIAL RATE: 84 BPM
ELECTRONICALLY SIGNED BY: NORMAL
MYELOID NGS RESULTS: NORMAL
P AXIS: 73 DEGREES
P OFFSET: 173 MS
P ONSET: 142 MS
PR INTERVAL: 152 MS
Q ONSET: 218 MS
QRS COUNT: 13 BEATS
QRS DURATION: 82 MS
QT INTERVAL: 406 MS
QTC CALCULATION(BAZETT): 479 MS
QTC FREDERICIA: 454 MS
R AXIS: -8 DEGREES
T AXIS: -1 DEGREES
T OFFSET: 421 MS
VENTRICULAR RATE: 84 BPM

## 2025-02-18 NOTE — NURSING NOTE
Patient discharged home via private car. Patient belongings and discharge education given to patient.

## 2025-02-19 LAB
ACT BLD: 239 SEC (ref 89–169)
ACT BLD: 244 SEC (ref 89–169)

## 2025-02-20 LAB
CHROM ANALY OVERALL INTERP-IMP: NORMAL
ELECTRONICALLY SIGNED BY CYTOGENETICS: NORMAL
VWF MULTIMERS PPP IB: NORMAL

## 2025-02-23 LAB
Q ONSET: 222 MS
QRS COUNT: 10 BEATS
QRS DURATION: 78 MS
QT INTERVAL: 448 MS
QTC CALCULATION(BAZETT): 439 MS
QTC FREDERICIA: 442 MS
R AXIS: 28 DEGREES
T AXIS: 87 DEGREES
T OFFSET: 446 MS
VENTRICULAR RATE: 58 BPM

## 2025-02-27 LAB — VWF CBA INHIBITOR PPP CHRO-ACNC: 356 IU/DL (ref 50–203)

## 2025-03-10 ENCOUNTER — TELEMEDICINE (OUTPATIENT)
Dept: HEMATOLOGY/ONCOLOGY | Facility: CLINIC | Age: 89
End: 2025-03-10
Payer: MEDICARE

## 2025-03-10 DIAGNOSIS — E53.8 B12 DEFICIENCY: ICD-10-CM

## 2025-03-10 DIAGNOSIS — R19.5 OCCULT BLOOD IN STOOLS: ICD-10-CM

## 2025-03-10 DIAGNOSIS — D75.9 CYTOPENIA: ICD-10-CM

## 2025-03-10 DIAGNOSIS — D64.9 SYMPTOMATIC ANEMIA: ICD-10-CM

## 2025-03-10 DIAGNOSIS — T14.8XXA BRUISING: Primary | ICD-10-CM

## 2025-03-10 DIAGNOSIS — D47.Z9 LOW GRADE B CELL LYMPHOPROLIFERATIVE DISORDER (MULTI): ICD-10-CM

## 2025-03-10 DIAGNOSIS — D53.0 ANEMIA DUE TO PROTEIN DEFICIENCY: ICD-10-CM

## 2025-03-10 PROCEDURE — 99214 OFFICE O/P EST MOD 30 MIN: CPT

## 2025-03-10 PROCEDURE — 1157F ADVNC CARE PLAN IN RCRD: CPT

## 2025-03-10 PROCEDURE — 1111F DSCHRG MED/CURRENT MED MERGE: CPT

## 2025-03-10 RX ORDER — LANOLIN ALCOHOL/MO/W.PET/CERES
1000 CREAM (GRAM) TOPICAL DAILY
Qty: 30 TABLET | Refills: 2 | Status: SHIPPED | OUTPATIENT
Start: 2025-03-10

## 2025-03-10 NOTE — PROGRESS NOTES
[facility]    PATIENT VISIT INFORMATION    Visit Type: Follow up Visit   I performed this visit using real-time telehealth tools, including an audio/video connection between (Michell Medrano at home) and (JUNIOR Pacheco at [facility])  Patient consents to telemedicine service today and understands the limitations of the visit, no physical examination and all issues may not be able to be addressed today, other than brief neuro and psych assessment.   Referring Provider: Hospitalist, JUNIOR Chacko   Reason for referral: Symptomatic Anemia   Primary Practice Provider: Zack Nieves MD    CANCER/HEMATOLOGY HISTORY    89-year-old  female presents for evaluation of symptomatic anemia.  Referred by [facility] group.  Per medical record review anemia dates back to February 2018 with hemoglobin 11.8 increased RDW.  History of iron infusions and blood transfusions through the years.     PMH of choledocholithiasis and chronic cholecystitis s/p recent laparoscopic cholecystectomy 10/11/24 c/b atrial flutter, RA, pancreatic cyst, HTN, chronic diastolic HF, KAROLINA, PFO per TTE 10/2024, hypothyroidism, GERD, chronic venous insufficiency, remote breast CA, venous insufficiency, gout and rheumatoid arthritis.  Notable decrease in kidney function.  Patient on methotrexate.     GI scopes planned in April 2025. Positive blood occult found in 11/2024 during hospitalization.    Hospitalized at [place] 10/6/2024 Received IV iron. Cholecystitis.  Cholecystectomy performed.    Hospitalized [facility] 11/21/2024 Possible allergic RXN to flecainide, Eliquis, or xarelto. Received Epi. Anemia, Occult blood positive. Outpatient GI workup recommended. 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  She will follow cardiology for anticoagulation management.    Hospitalized 2/17/2025 for ST elevation myocardial infarction (STEMI) and Stent placed. Hypotension.  "Follows cardiology.  Now on Plavix and ASA baby 81 mg.     ERCP 10/10/2024   Impression:     1. Choledocholithiasis s/p biliary sphincterotomy                            and stone extraction as detailed above.                            2. Filling of the cystic duct.                            3. Normal ventral PD at the level of the head.     HISTORY OF PRESENT ILLNESS     ID Statement: Michell Medrano is an 89 year old female     Chief Complaint: \"feeling pretty good\"    Interval History:   Michell presents alone for follow up.  She lives in [place].  She was in the hospital for a heart attack.  She comments fatigue before, feel better than before.  She feels overall improved.  She reports that in 2000 she had breast cancer and is a breast cancer survivor, and did have chemo and radiation. Left breast Lumpectomy with lymph node involvement. Hormone ER positive.  Took anastrozole for about 3 years.    She reports reports chronic anemia intermittent with blood and iron transfusions after all surgeries her entire life. Taking oral iron daily for many years. Some constipation, however manageable. UTI recently, no infection in over 5 years. On Estradiol more regularly now.  Lost weight after most recent surgery. Maintaining. Very good appetite.   Lightheadedness ongoing for several years, occasionally still occurs.  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 Use a walker. Vascular insufficiency.  Feels a little unsteady.  Left leg bone pain worse than right.  She endorses this pain started more recently and she even brought it up while she was in the hospital.  She denies any bleeding or bruising.  Denies F/C/recent illness/infection, drenching night sweats, unintentional weight loss, anorexia/loss of appetite, HA, dizziness, PICA, acute changes in vision/hearing, palpitations, CP, SOB, n/v/d/abd pain, gopi bleeding, melena, urinary issues now, haematuria, LAD, bruising, sores, itchy or rashes.      PAST/CURRENT HISTORY "     MEDICAL/SURGICAL HISTORY  Past Medical History:   Diagnosis Date    Breast cancer (Multi)     Hx antineoplastic chemo     Personal history of irradiation     Personal history of other diseases of the musculoskeletal system and connective tissue 2016    History of rheumatoid arthritis    Polyosteoarthritis, unspecified 2018    Generalized osteoarthritis      Past Surgical History:   Procedure Laterality Date    BREAST LUMPECTOMY      CARDIAC CATHETERIZATION N/A 2025    Procedure: Left Heart Cath, No LV;  Surgeon: Robin Enriquez DO;  Location: [facility] Card[facility];  Service: Cardiovascular;  Laterality: N/A;    CARDIAC CATHETERIZATION N/A 2025    Procedure: PCI;  Surgeon: Robin Enriquez DO;  Location: [facility] Cardiac Cath [facility];  Service: Cardiovascular;  Laterality: N/A;    OTHER SURGICAL HISTORY  2022    Lumpectomy    OTHER SURGICAL HISTORY Bilateral 2022    Knee replacement    TOTAL ABDOMINAL HYSTERECTOMY W/ BILATERAL SALPINGOOPHORECTOMY  12/10/2014    Total Abdominal Hysterectomy With Removal Of Both Ovaries    TOTAL HIP ARTHROPLASTY Bilateral 01/15/2014    Hip Replacement        SOCIAL HISTORY  -Lives alone at [facility] (one daughter healthy)  -Work place: Retired    -Tobacco/smokeless use: Never  -Alcohol: Denies   -Illicit drug or marijuana use: Denies   -Restoration or Spiritual beliefs: Taoism   -Social Determinates of Health Concerns: NA    FAMILY HISTORY  -Mom lived to be 105 years old arthritis  -dad  at age 86 years old prostate cancer, colostomy    -Younger brother new diagnosis Multiple Myeloma    -No other known history of hematologic, bleeding, clotting, autoimmune, genetic, or malignant disorders in the family.     OCCUPATIONAL/ENVIRONMENTAL HISTORY/EXPOSURES:  -None reported    Active Problems, Allergy List, Medication List, and PMH/PSH/FH/Social Hx have been reviewed and reconciled in chart. Updates made when necessary.      REVIEW OF SYSTEMS   A review of systems has been completed and are negative for complaints except what is stated in the assessment, HPI, IH, ROS, and/or past medical history.    ALLERGIES AND MEDICATIONS     Allergies and Intolerances:   Allergies   Allergen Reactions    Apixaban Anaphylaxis    Xarelto [Rivaroxaban] Shortness of breath    Doxycycline Unknown    Erythromycin Unknown    Erythromycin Base Unknown    Methen-M.Blue-S.Phos-Phsal-Hyo Unknown     Uribel CAPS    Naproxen Unknown    Penicillins Unknown    Prednisone Other     Flushed    Pseudoephedrine Unknown    Tetracycline Unknown    Cephalosporins Hives    Red Dye Rash      Medication Profile:   Current Outpatient Medications   Medication Instructions    acetaminophen (TYLENOL) 650 mg, Every 6 hours PRN    allopurinol (ZYLOPRIM) 200 mg, oral, Daily    aspirin 81 mg chewable tablet Chew and swallow 1 tablet (81 mg) once daily.    atorvastatin (LIPITOR) 40 mg, oral, Nightly    clopidogrel (Plavix) 75 mg tablet Take 1 tablet (75 mg) by mouth once daily. Do not start before February 18, 2025.    ergocalciferol (VITAMIN D-2) 1,250 mcg, oral, Every 30 days, Taking 2 times a month    estradiol (ESTRACE) 1 g, vaginal, 2 times weekly    ferrous sulfate 65 mg, Daily with breakfast    folic acid (FOLVITE) 1 mg, oral, Daily    gabapentin (NEURONTIN) 300 mg, oral, Nightly    levothyroxine (SYNTHROID, LEVOXYL) 50 mcg, Daily    methotrexate (TREXALL) 20 mg, oral, Once Weekly, Follow directions carefully, and ask to explain any part you do not understand. Take exactly as directed.    metoprolol succinate XL (TOPROL-XL) 25 mg, oral, Daily, Do not crush or chew.    omeprazole OTC (PRILOSEC OTC) 20 mg, Daily before breakfast    polyethylene glycol (GLYCOLAX, MIRALAX) 17 g, Daily    torsemide (DEMADEX) 20 mg, oral, Daily      Available Vaccination Record:   Immunization History   Administered Date(s) Administered    COVID-19, mRNA, LNP-S, PF, 30 mcg/0.3 mL dose  01/14/2021, 02/04/2021, 01/03/2022    Flu vaccine, trivalent, preservative free, HIGH-DOSE, age 65y+ (Fluzone) 09/19/2018    Influenza, Seasonal, Quadrivalent, Adjuvanted 10/17/2023    Novel influenza-H1N1-09, preservative-free 12/31/2009    RESPIRATORY SYNCYTIAL VIRUS (RSV), ELIGIBLE PREGNANT PTS, 0.5 ML (ABRYSVO) 01/26/2024      PHYSICAL EXAM     Vital Signs/Measurements:       2/16/2025     8:02 PM 2/16/2025    11:41 PM 2/17/2025     3:39 AM 2/17/2025     6:00 AM 2/17/2025     7:00 AM 2/17/2025    12:00 PM 2/17/2025     6:04 PM   Vitals   Systolic 111 105 121  142 124 141   Diastolic 66 44 65  65 55 59   BP Location Right arm Right arm Right arm  Right arm Right arm    Heart Rate 73 73 70    76   Temp 36.7 °C (98.1 °F) 36.3 °C (97.3 °F) 36.2 °C (97.2 °F)  36.6 °C (97.9 °F) 36.7 °C (98.1 °F)    Resp 18 18 18  20 18    Weight (lb)   120.81 120.81      BMI   20.74 kg/m2 20.74 kg/m2      BSA (m2)   1.57 m2 1.57 m2         Performance:   ECOG Performance Status: 0     Grade ECOG performance status   0 Fully active, able to carry on all pre-disease performance without restriction   1 Restricted in physically strenuous activity but ambulatory and able to carry out work of a light or sedentary nature, e.g., light housework, office work   2 Ambulatory and capable of all selfcare but unable to carry out any work activities; Up and about more than 50% of waking hours   3 Capable of only limited selfcare, confined to bed or chair more than 50% of waking hours   4 Completely disabled; Cannot carry out any selfcare; Totally confined to bed or chair   5 Dead     Physical Exam:  General: Patient is awake/alert/oriented x3, no distress   Psychological: Intact recent and remote memory, judgement, and insight. Appropriate mood, affect, and behavior     RESULTS/DATA     Labs:     Lab Results   Component Value Date    WBC 3.9 (L) 02/17/2025    NEUTROABS 2.06 02/17/2025    IGABSOL 0.03 02/17/2025    LYMPHSABS 1.25 02/17/2025     MONOSABS 0.38 02/17/2025    EOSABS 0.11 02/17/2025    BASOSABS 0.04 02/17/2025    RBC 2.74 (L) 02/17/2025     02/17/2025    MCHC 32.7 02/17/2025    HGB 9.0 (L) 02/17/2025    HCT 27.5 (L) 02/17/2025     02/17/2025     Lab Results   Component Value Date    RETICCTPCT 1.3 02/12/2025      Lab Results   Component Value Date    CREATININE 0.79 02/17/2025    BUN 22 02/17/2025    EGFR 72 02/17/2025     02/17/2025    K 4.2 02/17/2025     (H) 02/17/2025    CO2 24 02/17/2025      Lab Results   Component Value Date    ALT 43 02/14/2025    AST 57 (H) 02/14/2025    ALKPHOS 115 02/14/2025    BILITOT 0.6 02/14/2025      Lab Results   Component Value Date    TSH 2.48 11/21/2024     Lab Results   Component Value Date    TSH 2.48 11/21/2024     Lab Results   Component Value Date    IRON 63 02/12/2025    TIBC 301 02/12/2025    FERRITIN 236 (H) 02/12/2025      Lab Results   Component Value Date    [id number] 525 02/12/2025      Lab Results   Component Value Date    FOLATE 20.4 02/12/2025     Lab Results   Component Value Date    AINSLEY Negative 08/28/2024    RF 29 (H) 08/28/2024    SEDRATE 52 (H) 02/12/2025      Lab Results   Component Value Date    CRP 0.38 02/12/2025        Lab Results   Component Value Date     02/12/2025     Lab Results   Component Value Date    HAPTOGLOBIN 127 02/12/2025     Lab Results   Component Value Date    SPEP Normal. 02/12/2025     Lab Results   Component Value Date     02/12/2025     02/12/2025     02/12/2025        Lab Results   Component Value Date    WBC 3.9 (L) 02/17/2025    NEUTROABS 2.06 02/17/2025    IGABSOL 0.03 02/17/2025    LYMPHSABS 1.25 02/17/2025    MONOSABS 0.38 02/17/2025    EOSABS 0.11 02/17/2025    BASOSABS 0.04 02/17/2025    RBC 2.74 (L) 02/17/2025     02/17/2025    MCHC 32.7 02/17/2025    HGB 9.0 (L) 02/17/2025    HCT 27.5 (L) 02/17/2025     02/17/2025     Lab Results   Component Value Date    RETICCTPCT 1.3 02/12/2025       Lab Results   Component Value Date    CREATININE 0.79 02/17/2025    BUN 22 02/17/2025    EGFR 72 02/17/2025     02/17/2025    K 4.2 02/17/2025     (H) 02/17/2025    CO2 24 02/17/2025      Lab Results   Component Value Date    ALT 43 02/14/2025    AST 57 (H) 02/14/2025    ALKPHOS 115 02/14/2025    BILITOT 0.6 02/14/2025      Lab Results   Component Value Date    TSH 2.48 11/21/2024     Lab Results   Component Value Date    TSH 2.48 11/21/2024     Lab Results   Component Value Date    IRON 63 02/12/2025    TIBC 301 02/12/2025    FERRITIN 236 (H) 02/12/2025      Lab Results   Component Value Date    [id number] 525 02/12/2025      Lab Results   Component Value Date    FOLATE 20.4 02/12/2025     Lab Results   Component Value Date    AINSLEY Negative 08/28/2024    RF 29 (H) 08/28/2024    SEDRATE 52 (H) 02/12/2025      Lab Results   Component Value Date    CRP 0.38 02/12/2025        Lab Results   Component Value Date    SPEP Normal. 02/12/2025     Lab Results   Component Value Date    FLOWDIFF  02/12/2025     Lymphocyte: 23 %       CD3+CD4+: 59 % ; Polyclonal           CD3+CD8+: 23 % ; Polyclonal             Natural Killer Cells: 10 %         CD19+: 7 %         B Cell Light Chain Expression: Small monoclonal subset on polyclonal background           Surface Kappa/Surface Lambda:    68:26 (total CD19)         81:12 (CD19 dim subset, 0.1%)    Clonal B cells are CD19+ (dim), CD5-, CD10-, CD11c+, CD23-, CD25-, CD20+, CD79b+ (bright), and kappa+ (0.1%)        Monocyte: 7 %   No immunophenotypic abnormality was detected.        Granulocyte: 65 %   No immunophenotypic abnormality was detected.                Lab Results   Component Value Date    MYELOIDRES  02/12/2025     DISEASE ASSOCIATED GENOMIC FINDINGS: Not Detected.    INTERPRETATION:  No variants are detected in the listed gene regions.  This finding does not exclude the presence of genetic alterations present in gene regions not tested or occurring at a  frequency below the limit of detection.    DISEASE RELEVANT ALTERATIONS NOT DETECTED:   Negative for JAK2 V617F.  Negative for JAK2 exon 12 mutation.  Negative for CALR mutation.  Negative for MPL mutation.      DISCLAIMER:   This assay is designed to detect targeted clinically-relevant single nucleotide variants and insertions and deletions (<30bp) in a select group of genes. This assay has also been designed to detect specific larger insertions and deletions: FLT3 internal tandem duplication (ITD) and CALR Type I mutations. This assay does not distinguish between somatic and germline alterations in analyzed regions. A negative result (mutation not identified) does not rule out the presence of a mutation below the limit of detection of this assay due to low neoplastic cell content, tumor heterogeneity, or the presence of additional mutations in the listed genes which are outside of the target regions in this assay. Mutations in some homopolymeric regions (eg. ASXL1 p.[id number]*12) are not consistently detected by this technology. General population polymorphisms, promoter, synonymous and intronic variants (with the exception of splice variants) are not generally included in this report.    Identification or absence of cancer-associated mutations does not necessarily indicate a response to therapy. Decisions on patient care and treatment must be based on the independent medical judgment of the treating physician, taking into account all applicable information concerning the patient's condition such as clinical and histopathologic findings, other laboratory findings, and patient preferences. This report includes information from public sources, including scientific and medical literature to better characterize the significance of alterations detected.    This laboratory developed test was developed and its analytical performance characteristics have been determined by  [facility]. This test has not been  cleared or approved by the FDA; however, the FDA has determined that such approval is not necessary. [facility] is certified under the Clinical Laboratory Improvement Amendments of 1988 (CLIA-88) as qualified to perform high complexity testing.    PANEL GENE LIST:  ASXL1(11-12), BRAF(15), CALR(9), CBL(7-9), CSF3R(14,17), DNMT3A(8-12,18-19,22-23), ETV6(2-8), EZH2(15-19), FLT3(14-16,20), GATA2(4-6), IDH1(4), IDH2(4), JAK2(12,14), JAK3(4,13,16), KIT(17), KRAS(2-4), MPL(4,10), MYD88(5), NPM1(11), NRAS(2-3), PHF6(2-10), PTPN11(3,13), RUNX1(4-9), SETBP1(4), SF3B1(14-16), SRSF2(1), TET2(3-11), TP53(2-11), U2AF1(2,6), WT1(7,9), ZRSR2(1-10).    Not all exons are sequenced in their entirety. Exons covered are shown in parenthesis. Genome assembly (hg19) was used for alignment and variant calling.        Radiology/Studies:   CT soft tissue neck w IV contrast 11/21/2024  IMPRESSION:  No evidence of cervical adenopathy or a soft tissue mass in the neck.   No acute findings.    Mild-to-moderate multilevel degenerative cervical disc disease.  Moderate to advanced multilevel degenerative cervical facet disease.  Partial osseous fusion C5-C6.  CT abdomen pelvis w IV contrast 10/6/2024    Impression:   CHEST:   No pulmonary embolism.   Abdomen and pelvis:   Distended gallbladder.  Ultrasound recommended for further evaluation   Nonspecific mild dilatation of the pancreatic duct.  Nonemergent MRI/MRCP   recommended for further evaluation.   CT angio chest w and wo IV contrast 10/6/2024  Impression:   CHEST:   No pulmonary embolism.   Abdomen and pelvis:   Distended gallbladder.  Ultrasound recommended for further evaluation   Nonspecific mild dilatation of the pancreatic duct.  Nonemergent MRI/MRCP   recommended for further evaluation.     ASSESSMENT/PLAN     Assessment and Plan:   #1. Macrocytic anemia   #2. Leukopenia   89-year-old  female presents for evaluation of symptomatic anemia.  Referred by [facility] group.  Per  medical record review anemia dates back to February 2018 with hemoglobin 11.8 increased RDW.  History of iron infusions and blood transfusions through the years.     PMH of choledocholithiasis and chronic cholecystitis s/p recent laparoscopic cholecystectomy 10/11/24 c/b atrial flutter, RA, pancreatic cyst, HTN, chronic diastolic HF, KAROLINA, PFO per TTE 10/2024, hypothyroidism, GERD, chronic venous insufficiency, remote breast CA, venous insufficiency, gout and rheumatoid arthritis.      Notable decrease in kidney function.    Patient on methotrexate, which is known to suppress bone marrow.   Cholecystectomy performed in October 2024.  Hospitalization November 2024 for acute anaphylaxis.  Hospitalized February 17, 2025 for STEMI.  Stent placed now on Plavix and aspirin.  GI scopes planned in April 2025. Positive blood occult found in 11/2024 during hospitalization.    Workup:  Discussed anemia workup with patient.  She is in agreement.  There may be a component of acute on chronic anemia related to chronic kidney disease versus bleeding.  Patient reports being anemic her whole life.  Hemoglobin identification to rule out thalassemia.  We will order von Willebrand labs to rule out bleeding disorder per patient report of multiple transfusions following surgical procedures.  Continue oral iron at this time.  Patient has rheumatoid arthritis and is on methotrexate which will contribute to anemia.  We will meet in a few weeks to review results.  If iron infusions are necessary she will have them at [facility] and would follow-up with a provider locally there.  October 2024 infusions while inpatient.    Workup discussion:  Taking iron daily for years.   Patient will go next week when she sees her family practice provider and have labs drawn.  If iron is needed we will order IV.  Patient has colonoscopy planned at [facility] on April 2, 2025.  Discussed results of workup.  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  Whalan light chain 4.27 with positive ratio 1.69.  No specific immunophenotypic or morphologic abnormality.  However, we will monitor.  MYD 88 will be drawn to assess for genetic mutation.  Myeloid malignancy panel negative for JAK2 V6 17F, exon 12 mutation, CALR mutation or MPL mutation.  Negative for von Willebrand.  B12 above 500 she will take B12 and folic acid daily.  Negative CRP and sed rate slightly elevated at 52.  Likely this is inflammatory.  Patient is on methotrexate which can also cause myelosuppression.  SPEP negative.  While inpatient CBC shows hemoglobin of 9, RBCs 2.74, WBC 3.9 and NRBC 0.5.  We will repeat this blood work.    **Please follow with specialties as scheduled for other health needs and routine screenings.**    Follow up:    RTC:  -2 months with Yamile Briggs PA-C    Medications:  -Take B12/folic acid daily  -IV iron as needed    Imaging/Testing:  -NA    Referral(s):  -GI    Other Pertinent Appointments:  -Opth 3/18/2025  -EGD/Colonoscopy 4/2/2025  -Cardiology 4/2/2025   -Gyn 8/21/2025     Patient Discussion Summary:  Discussed plan of care in detail. Patient states understanding and in agreement to the plan. Answered all questions to there liking. The patient will call with any additional questions and/or concerns.    Thank you for allowing me to participate in your care. It was a pleasure meeting you.    Sincerely,  Mary Hall, APRN-CNP     Hematology/Oncology Clinical Nurse Practitioner     [facility]   [id number]  [place] [address] [place], [place] [id number]  Office #: [phone]    DISCLAIMER:   In preparing for this visit and writing this note, I reviewed all the previous electronic medical records (testing, labs, imaging, and other procedures, provider notes, and medical charts) of the patient available in the physician portal pertinent to patient care. Significant findings which helped in decision making are recorded in  this chart.  A few details copied from my note on 2/12/2025, the details have been updated and all reflect current decision making from today, 3/10/2025.    This document may have been written by voice recognition software.  There may be some incorrect wording, spelling and/or spelling errors or punctuation errors that were not corrected prior to committing the note to the medical record. Please request clarification if there is documentation error or clarification is needed.   Time based billing: Please see documentation within this specific encounter.

## 2025-03-12 ENCOUNTER — APPOINTMENT (OUTPATIENT)
Facility: CLINIC | Age: 89
End: 2025-03-12
Payer: MEDICARE

## 2025-03-17 ENCOUNTER — OFFICE VISIT (OUTPATIENT)
Facility: CLINIC | Age: 89
End: 2025-03-17
Payer: MEDICARE

## 2025-03-17 VITALS
WEIGHT: 142 LBS | DIASTOLIC BLOOD PRESSURE: 60 MMHG | HEART RATE: 67 BPM | OXYGEN SATURATION: 99 % | SYSTOLIC BLOOD PRESSURE: 110 MMHG | BODY MASS INDEX: 24.37 KG/M2

## 2025-03-17 DIAGNOSIS — E78.00 PURE HYPERCHOLESTEROLEMIA: ICD-10-CM

## 2025-03-17 DIAGNOSIS — Z95.5 HISTORY OF CORONARY ANGIOPLASTY WITH INSERTION OF STENT: Primary | ICD-10-CM

## 2025-03-17 DIAGNOSIS — T14.8XXA BRUISING: ICD-10-CM

## 2025-03-17 DIAGNOSIS — D64.9 SYMPTOMATIC ANEMIA: ICD-10-CM

## 2025-03-17 DIAGNOSIS — D75.9 CYTOPENIA: ICD-10-CM

## 2025-03-17 DIAGNOSIS — R19.5 OCCULT BLOOD IN STOOLS: ICD-10-CM

## 2025-03-17 DIAGNOSIS — D47.Z9 LOW GRADE B CELL LYMPHOPROLIFERATIVE DISORDER (MULTI): ICD-10-CM

## 2025-03-17 DIAGNOSIS — D53.0 ANEMIA DUE TO PROTEIN DEFICIENCY: ICD-10-CM

## 2025-03-17 PROCEDURE — 1036F TOBACCO NON-USER: CPT | Performed by: INTERNAL MEDICINE

## 2025-03-17 PROCEDURE — 1157F ADVNC CARE PLAN IN RCRD: CPT | Performed by: INTERNAL MEDICINE

## 2025-03-17 PROCEDURE — 1126F AMNT PAIN NOTED NONE PRSNT: CPT | Performed by: INTERNAL MEDICINE

## 2025-03-17 PROCEDURE — 3074F SYST BP LT 130 MM HG: CPT | Performed by: INTERNAL MEDICINE

## 2025-03-17 PROCEDURE — 99214 OFFICE O/P EST MOD 30 MIN: CPT | Performed by: INTERNAL MEDICINE

## 2025-03-17 PROCEDURE — 1159F MED LIST DOCD IN RCRD: CPT | Performed by: INTERNAL MEDICINE

## 2025-03-17 PROCEDURE — 3078F DIAST BP <80 MM HG: CPT | Performed by: INTERNAL MEDICINE

## 2025-03-17 PROCEDURE — 1111F DSCHRG MED/CURRENT MED MERGE: CPT | Performed by: INTERNAL MEDICINE

## 2025-03-17 ASSESSMENT — ENCOUNTER SYMPTOMS
DEPRESSION: 0
LOSS OF SENSATION IN FEET: 0
OCCASIONAL FEELINGS OF UNSTEADINESS: 1

## 2025-03-17 ASSESSMENT — PATIENT HEALTH QUESTIONNAIRE - PHQ9
1. LITTLE INTEREST OR PLEASURE IN DOING THINGS: NOT AT ALL
SUM OF ALL RESPONSES TO PHQ9 QUESTIONS 1 AND 2: 0
2. FEELING DOWN, DEPRESSED OR HOPELESS: NOT AT ALL

## 2025-03-17 ASSESSMENT — LIFESTYLE VARIABLES: TOTAL SCORE: 0

## 2025-03-17 ASSESSMENT — PAIN SCALES - GENERAL: PAINLEVEL_OUTOF10: 0-NO PAIN

## 2025-03-17 NOTE — PROGRESS NOTES
Subjective      Chief Complaint   Patient presents with    Hospital Follow-up        Here after recent non-STEMI and cardiac catheterization showing moderate LAD disease and severe mid right coronary disease stenting with overlapping 3.5/30 and 3.5/12 mm drug-eluting stents with 0% residual stenosis.  She had preserved LV systolic function and is done well after discharge.         Review of Systems   All other systems reviewed and are negative.       Objective   Physical Exam  Constitutional:       Appearance: Normal appearance.   HENT:      Head: Normocephalic and atraumatic.   Eyes:      Pupils: Pupils are equal, round, and reactive to light.   Cardiovascular:      Rate and Rhythm: Normal rate and regular rhythm.      Pulses: Normal pulses.      Heart sounds: Normal heart sounds.   Pulmonary:      Effort: Pulmonary effort is normal.      Breath sounds: Normal breath sounds.   Abdominal:      General: Abdomen is flat. Bowel sounds are normal.      Palpations: Abdomen is soft.   Musculoskeletal:         General: Normal range of motion.      Cervical back: Normal range of motion.   Skin:     General: Skin is warm and dry.   Neurological:      General: No focal deficit present.   Psychiatric:         Mood and Affect: Mood normal.         Judgment: Judgment normal.          Lab Review:   Not applicable    ST elevation myocardial infarction (STEMI), unspecified artery (Multi)  Stable after successful PCI    History of coronary angioplasty with insertion of stent   Cardiac cath March 2025 : #1 the left main was a large vessel that bifurcated into the LAD and circumflex and had no obstructive disease.     2.  The LAD was a large vessel that extended to the apex and was a tortuous vessel the proximal and mid LAD had some aneurysmal dilation but mild nonobstructive disease.  There was a modest sized diagonal branch that had 50 to 60% ostial and proximal narrowing.     3.  The circumflex was a nondominant vessel and the  circumflex and its branches had nonobstructive disease     4.  The right coronary artery was a large dominant vessel that had mild 40% smooth proximal stenosis and then the mid vessel had severe diffuse 80% hazy stenosis from the proximal vessel extending throughout the mid vessel.  The distal right coronary gave rise to a moderate-sized PDA branch and posterolateral branches that had mild nonobstructive disease.     Left ventriculogram was performed after PCI of the right coronary artery with a 10 cc hand contrast injection to minimize contrast load in the setting of chronic kidney disease.  There was minimal inferior wall hypokinesis with overall left ventricular ejection fraction estimated at 65 to 70%.     PCI intervention:  Successful right coronary artery intervention initially with a 2.5/12 mm balloon placed in the mid right coronary artery then a 3.5/30 mm drug-eluting stent and deployed to 3.6 mm, and then postdilated with a 3.5/12 mm NC balloon to 3.62 mm with good step up proximally and mid and distally.  The distal coronary artery just beyond the distal stent edge had an irregular lumen that was suspicious for localized plaque disruption or localized dissection so an overlapping 3.5/12 mm second drug-eluting stent was deployed to 3.6 mm and dilated to 3.6 to millimeters     Pure hypercholesterolemia  Continue high-dose statin therapy

## 2025-03-17 NOTE — ASSESSMENT & PLAN NOTE
Stable after successful PCI.  He has an issue of chronic anemia and was planning on having endoscopy done April 2, 2025 but that will have to be postponed for an additional 2 months and then after 3 months she can be off of her Plavix.    Because of her chronic bleeding and persistent anemia I will discontinue her low-dose aspirin while she is on clopidogrel and we will not resume aspirin until she is done with clopidogrel.  We may have to shorten the duration of her post stent antiplatelet therapy if she is going to have ongoing anemia from chronic blood loss and we discussed this today.

## 2025-03-17 NOTE — ASSESSMENT & PLAN NOTE
Cardiac cath March 2025 : #1 the left main was a large vessel that bifurcated into the LAD and circumflex and had no obstructive disease.     2.  The LAD was a large vessel that extended to the apex and was a tortuous vessel the proximal and mid LAD had some aneurysmal dilation but mild nonobstructive disease.  There was a modest sized diagonal branch that had 50 to 60% ostial and proximal narrowing.     3.  The circumflex was a nondominant vessel and the circumflex and its branches had nonobstructive disease     4.  The right coronary artery was a large dominant vessel that had mild 40% smooth proximal stenosis and then the mid vessel had severe diffuse 80% hazy stenosis from the proximal vessel extending throughout the mid vessel.  The distal right coronary gave rise to a moderate-sized PDA branch and posterolateral branches that had mild nonobstructive disease.     Left ventriculogram was performed after PCI of the right coronary artery with a 10 cc hand contrast injection to minimize contrast load in the setting of chronic kidney disease.  There was minimal inferior wall hypokinesis with overall left ventricular ejection fraction estimated at 65 to 70%.     PCI intervention:  Successful right coronary artery intervention initially with a 2.5/12 mm balloon placed in the mid right coronary artery then a 3.5/30 mm drug-eluting stent and deployed to 3.6 mm, and then postdilated with a 3.5/12 mm NC balloon to 3.62 mm with good step up proximally and mid and distally.  The distal coronary artery just beyond the distal stent edge had an irregular lumen that was suspicious for localized plaque disruption or localized dissection so an overlapping 3.5/12 mm second drug-eluting stent was deployed to 3.6 mm and dilated to 3.6 to millimeters

## 2025-03-18 ENCOUNTER — APPOINTMENT (OUTPATIENT)
Dept: CARDIOLOGY | Facility: HOSPITAL | Age: 89
End: 2025-03-18
Payer: MEDICARE

## 2025-03-18 ENCOUNTER — APPOINTMENT (OUTPATIENT)
Dept: RADIOLOGY | Facility: HOSPITAL | Age: 89
End: 2025-03-18
Payer: MEDICARE

## 2025-03-18 ENCOUNTER — HOSPITAL ENCOUNTER (EMERGENCY)
Facility: HOSPITAL | Age: 89
Discharge: HOME | End: 2025-03-18
Attending: EMERGENCY MEDICINE
Payer: MEDICARE

## 2025-03-18 VITALS
SYSTOLIC BLOOD PRESSURE: 133 MMHG | RESPIRATION RATE: 18 BRPM | WEIGHT: 140 LBS | BODY MASS INDEX: 23.9 KG/M2 | DIASTOLIC BLOOD PRESSURE: 56 MMHG | TEMPERATURE: 97.2 F | HEART RATE: 87 BPM | HEIGHT: 64 IN | OXYGEN SATURATION: 100 %

## 2025-03-18 DIAGNOSIS — R42 LIGHTHEADEDNESS: Primary | ICD-10-CM

## 2025-03-18 LAB
ALBUMIN SERPL BCP-MCNC: 3.4 G/DL (ref 3.4–5)
ALP SERPL-CCNC: 111 U/L (ref 33–136)
ALT SERPL W P-5'-P-CCNC: 15 U/L (ref 7–45)
ANION GAP SERPL CALCULATED.3IONS-SCNC: 13 MMOL/L (ref 10–20)
AST SERPL W P-5'-P-CCNC: 23 U/L (ref 9–39)
BASOPHILS # BLD AUTO: 0.02 X10*3/UL (ref 0–0.1)
BASOPHILS NFR BLD AUTO: 0.5 %
BILIRUB SERPL-MCNC: 0.7 MG/DL (ref 0–1.2)
BUN SERPL-MCNC: 23 MG/DL (ref 6–23)
CALCIUM SERPL-MCNC: 8.7 MG/DL (ref 8.6–10.3)
CARDIAC TROPONIN I PNL SERPL HS: 28 NG/L (ref 0–13)
CARDIAC TROPONIN I PNL SERPL HS: 29 NG/L (ref 0–13)
CHLORIDE SERPL-SCNC: 100 MMOL/L (ref 98–107)
CO2 SERPL-SCNC: 24 MMOL/L (ref 21–32)
CREAT SERPL-MCNC: 0.84 MG/DL (ref 0.5–1.05)
EGFRCR SERPLBLD CKD-EPI 2021: 67 ML/MIN/1.73M*2
EOSINOPHIL # BLD AUTO: 0.14 X10*3/UL (ref 0–0.4)
EOSINOPHIL NFR BLD AUTO: 3.2 %
ERYTHROCYTE [DISTWIDTH] IN BLOOD BY AUTOMATED COUNT: 17.4 % (ref 11.5–14.5)
FLUAV RNA RESP QL NAA+PROBE: NOT DETECTED
FLUBV RNA RESP QL NAA+PROBE: NOT DETECTED
GLUCOSE SERPL-MCNC: 138 MG/DL (ref 74–99)
HCT VFR BLD AUTO: 27.3 % (ref 36–46)
HGB BLD-MCNC: 8.9 G/DL (ref 12–16)
IMM GRANULOCYTES # BLD AUTO: 0.04 X10*3/UL (ref 0–0.5)
IMM GRANULOCYTES NFR BLD AUTO: 0.9 % (ref 0–0.9)
LYMPHOCYTES # BLD AUTO: 0.89 X10*3/UL (ref 0.8–3)
LYMPHOCYTES NFR BLD AUTO: 20.6 %
MAGNESIUM SERPL-MCNC: 1.83 MG/DL (ref 1.6–2.4)
MCH RBC QN AUTO: 33.1 PG (ref 26–34)
MCHC RBC AUTO-ENTMCNC: 32.6 G/DL (ref 32–36)
MCV RBC AUTO: 102 FL (ref 80–100)
MONOCYTES # BLD AUTO: 0.38 X10*3/UL (ref 0.05–0.8)
MONOCYTES NFR BLD AUTO: 8.8 %
NEUTROPHILS # BLD AUTO: 2.86 X10*3/UL (ref 1.6–5.5)
NEUTROPHILS NFR BLD AUTO: 66 %
NRBC BLD-RTO: 0 /100 WBCS (ref 0–0)
PLATELET # BLD AUTO: 188 X10*3/UL (ref 150–450)
POTASSIUM SERPL-SCNC: 3.5 MMOL/L (ref 3.5–5.3)
PROT SERPL-MCNC: 5.8 G/DL (ref 6.4–8.2)
RBC # BLD AUTO: 2.69 X10*6/UL (ref 4–5.2)
SARS-COV-2 RNA RESP QL NAA+PROBE: NOT DETECTED
SODIUM SERPL-SCNC: 133 MMOL/L (ref 136–145)
WBC # BLD AUTO: 4.3 X10*3/UL (ref 4.4–11.3)

## 2025-03-18 PROCEDURE — 36415 COLL VENOUS BLD VENIPUNCTURE: CPT | Performed by: PHYSICIAN ASSISTANT

## 2025-03-18 PROCEDURE — 80053 COMPREHEN METABOLIC PANEL: CPT | Performed by: PHYSICIAN ASSISTANT

## 2025-03-18 PROCEDURE — 93005 ELECTROCARDIOGRAM TRACING: CPT

## 2025-03-18 PROCEDURE — 87636 SARSCOV2 & INF A&B AMP PRB: CPT | Performed by: PHYSICIAN ASSISTANT

## 2025-03-18 PROCEDURE — 2500000004 HC RX 250 GENERAL PHARMACY W/ HCPCS (ALT 636 FOR OP/ED): Performed by: PHYSICIAN ASSISTANT

## 2025-03-18 PROCEDURE — 84484 ASSAY OF TROPONIN QUANT: CPT | Performed by: PHYSICIAN ASSISTANT

## 2025-03-18 PROCEDURE — 70450 CT HEAD/BRAIN W/O DYE: CPT

## 2025-03-18 PROCEDURE — 85025 COMPLETE CBC W/AUTO DIFF WBC: CPT | Performed by: PHYSICIAN ASSISTANT

## 2025-03-18 PROCEDURE — 83735 ASSAY OF MAGNESIUM: CPT | Performed by: PHYSICIAN ASSISTANT

## 2025-03-18 PROCEDURE — 71045 X-RAY EXAM CHEST 1 VIEW: CPT | Performed by: RADIOLOGY

## 2025-03-18 PROCEDURE — 99285 EMERGENCY DEPT VISIT HI MDM: CPT | Mod: 25 | Performed by: EMERGENCY MEDICINE

## 2025-03-18 PROCEDURE — 71045 X-RAY EXAM CHEST 1 VIEW: CPT

## 2025-03-18 RX ADMIN — SODIUM CHLORIDE 500 ML: 900 INJECTION, SOLUTION INTRAVENOUS at 11:44

## 2025-03-18 ASSESSMENT — LIFESTYLE VARIABLES
EVER HAD A DRINK FIRST THING IN THE MORNING TO STEADY YOUR NERVES TO GET RID OF A HANGOVER: NO
TOTAL SCORE: 0
HAVE YOU EVER FELT YOU SHOULD CUT DOWN ON YOUR DRINKING: NO
EVER FELT BAD OR GUILTY ABOUT YOUR DRINKING: NO
HAVE PEOPLE ANNOYED YOU BY CRITICIZING YOUR DRINKING: NO

## 2025-03-18 ASSESSMENT — COLUMBIA-SUICIDE SEVERITY RATING SCALE - C-SSRS
1. IN THE PAST MONTH, HAVE YOU WISHED YOU WERE DEAD OR WISHED YOU COULD GO TO SLEEP AND NOT WAKE UP?: NO
2. HAVE YOU ACTUALLY HAD ANY THOUGHTS OF KILLING YOURSELF?: NO
6. HAVE YOU EVER DONE ANYTHING, STARTED TO DO ANYTHING, OR PREPARED TO DO ANYTHING TO END YOUR LIFE?: NO

## 2025-03-18 ASSESSMENT — PAIN - FUNCTIONAL ASSESSMENT: PAIN_FUNCTIONAL_ASSESSMENT: 0-10

## 2025-03-18 ASSESSMENT — PAIN SCALES - GENERAL: PAINLEVEL_OUTOF10: 0 - NO PAIN

## 2025-03-18 NOTE — PROGRESS NOTES
Pharmacy Medication History Review    Michell Medrano is a 89 y.o. female. Pharmacy reviewed the patient's [id number] medications and allergies for accuracy.    Medications ADDED:  NONE  Medications CHANGED:  NONE  Medications REMOVED:   NONE     The list below reflects the updated PTA list. Comments regarding how patient may be taking medications differently can be found in the Admit Orders Activity  Prior to Admission Medications   Prescriptions Last Dose Informant   acetaminophen (Tylenol) 325 mg tablet Unknown Self   Sig: Take 2 tablets (650 mg) by mouth every 6 hours if needed.   allopurinol (Zyloprim) 100 mg tablet 3/17/2025 Morning Self   Sig: Take 2 tablets (200 mg) by mouth once daily.   aspirin 81 mg chewable tablet 3/17/2025 Morning Self   Sig: Chew and swallow 1 tablet (81 mg) once daily.   atorvastatin (Lipitor) 40 mg tablet 3/17/2025 Bedtime Self   Sig: Take 1 tablet (40 mg) by mouth once daily at bedtime.   clopidogrel (Plavix) 75 mg tablet 3/17/2025 Self   Sig: Take 1 tablet (75 mg) by mouth once daily. Do not start before February 18, 2025.   cyanocobalamin (Vitamin B-12) 1,000 mcg tablet 3/17/2025 Morning Self   Sig: Take 1 tablet (1,000 mcg) by mouth once daily.   ergocalciferol (Vitamin D-2) 1.25 MG (44850 UT) capsule 3/15/2025 Self   Sig: Take 1 capsule (1,250 mcg) by mouth every 30 (thirty) days. Taking 2 times a month   Patient taking differently: Take 1 capsule (1,250 mcg) by mouth every 30 (thirty) days. Taking 2 times a month take the 1st and 15th of the month   estradiol (Estrace) 0.01 % (0.1 mg/gram) vaginal cream Past Week Self   Sig: Insert 0.25 Applicatorfuls (1 g) into the vagina 2 times a week.   ferrous sulfate 325 (65 Fe) MG EC tablet 3/16/2025 Self   Sig: Take 65 mg by mouth once daily with breakfast. Do not crush, chew, or split.   folic acid (Folvite) 1 mg tablet 3/17/2025 Morning Self   Sig: Take 1 tablet (1 mg) by mouth once daily.   gabapentin (Neurontin) 300 mg  capsule 3/17/2025 Bedtime Self   Sig: Take 1 capsule (300 mg) by mouth once daily at bedtime.   levothyroxine (Synthroid, Levoxyl) 50 mcg tablet 3/17/2025 Morning Self   Sig: Take 1 tablet (50 mcg) by mouth early in the morning.. Take on an empty stomach at the same time each day, either 30 to 60 minutes prior to breakfast   methotrexate (Trexall) 2.5 mg tablet 3/12/2025 Self   Sig: Take 8 tablets (20 mg total) by mouth 1 (one) time per week.  Follow directions carefully, and ask to explain any part you do not understand. Take exactly as directed.   metoprolol succinate XL (Toprol-XL) 25 mg 24 hr tablet 3/17/2025 Morning Self   Sig: Take 1 tablet (25 mg) by mouth once daily. Do not crush or chew.   omeprazole OTC (PriLOSEC OTC) 20 mg EC tablet 3/17/2025 Evening Self   Sig: Take 1 tablet (20 mg) by mouth once daily in the morning. Take before meals. Do not crush, chew, or split.   polyethylene glycol (Glycolax, Miralax) 17 gram packet 3/17/2025 Morning Self   Sig: Take 17 g by mouth once daily.   torsemide (Demadex) 10 mg tablet 3/17/2025 Morning Self   Sig: Take 2 tablets (20 mg) by mouth once daily.      Facility-Administered Medications: None        The list below reflects the updated allergy list. Please review each documented allergy for additional clarification and justification.  Allergies  Reviewed by Gayathri Langford CPhT on 3/18/2025        Severity Reactions Comments    Apixaban High Anaphylaxis     Xarelto [rivaroxaban] High Shortness of breath     Doxycycline Not Specified Unknown     Erythromycin Not Specified Unknown     Erythromycin Base Not Specified Unknown     Methen-m.blue-s.phos-phsal-hyo Not Specified Unknown Uribel CAPS    Naproxen Not Specified Unknown     Penicillins Not Specified Unknown     Prednisone Not Specified Other Flushed    Pseudoephedrine Not Specified Unknown     Tetracycline Not Specified Unknown     Cephalosporins Low Hives     Red Dye Low Rash             Pharmacy has been  updated to [facility].    Sources used to complete the med history include dispense history, PTA medication list, patient interview. Patient is a good historian.    Below are additional concerns with the patient's PTA list.  None     Gayathri Langford, Maru-Adv  Please reach out via [employer] Secure Chat for questions

## 2025-03-18 NOTE — ED PROVIDER NOTES
"Emergency Department Encounter  [facility] EMERGENCY MEDICINE    Patient: Michell Medrano  MRN: [id number]  : 1935  Date of Evaluation: 3/18/2025  ED Supervising Physician:  Celestino Calvillo DO    I personally evaluated Michell Medrano and made/approved the management plan and take responsibility for the patient management.    This will serve as my Supervisory note and shared attestation.  I did perform a substantive portion of the visit including all aspects of the Medical Decision Making.         CHIEF COMPLAINT       Chief Complaint   Patient presents with    Dizziness     Pt states that she feels dizzy, light headed, and like she is \"off.\" Pt has had low sodium before and is experiencing similar symptoms       In brief, Michell Medrano is a 89 y.o. that presents to the emergency department lightheadedness concern for low sodium    Focused exam:   GEN: patient nontoxic appearing, no acute distress in room, well-nourished well-developed  HEENT: pupils equal and round bilaterally no nystagmus no gaze preference, face symmetric no   droop  HEART: heart rate regular, rhythm regular, , no peripheral pitting edema ,no   asymmetrical swelling of extremities, distal pulses equal and intact bilaterally  LUNGS: clear to auscultation bilaterally no rhonchi rales or wheezes  NEURO: no focal neurologic deficit, awake and alert and oriented ×3, Sensation grossly intact      Vitals:    25 0931   BP: 133/56   BP Location: Right arm   Patient Position: Sitting   Pulse: 87   Resp: 18   Temp: 36.2 °C (97.2 °F)   TempSrc: Temporal   SpO2: 100%   Weight: 63.5 kg (140 lb)   Height: 1.626 m (5' 4\")        Labs Reviewed   CBC WITH AUTO DIFFERENTIAL - Abnormal       Result Value    WBC 4.3 (*)     nRBC 0.0      RBC 2.69 (*)     Hemoglobin 8.9 (*)     Hematocrit 27.3 (*)      (*)     MCH 33.1      MCHC 32.6      RDW 17.4 (*)     Platelets 188      Neutrophils % 66.0      Immature Granulocytes %, " Automated 0.9      Lymphocytes % 20.6      Monocytes % 8.8      Eosinophils % 3.2      Basophils % 0.5      Neutrophils Absolute 2.86      Immature Granulocytes Absolute, Automated 0.04      Lymphocytes Absolute 0.89      Monocytes Absolute 0.38      Eosinophils Absolute 0.14      Basophils Absolute 0.02     COMPREHENSIVE METABOLIC PANEL - Abnormal    Glucose 138 (*)     Sodium 133 (*)     Potassium 3.5      Chloride 100      Bicarbonate 24      Anion Gap 13      Urea Nitrogen 23      Creatinine 0.84      eGFR 67      Calcium 8.7      Albumin 3.4      Alkaline Phosphatase 111      Total Protein 5.8 (*)     AST 23      Bilirubin, Total 0.7      ALT 15     SERIAL TROPONIN-INITIAL - Abnormal    Troponin I, High Sensitivity 29 (*)     Narrative:     Less than 99th percentile of normal range cutoff-  Female and children under 18 years old <14 ng/L; Male <21 ng/L: Negative  Repeat testing should be performed if clinically indicated.     Female and children under 18 years old 14-50 ng/L; Male 21-50 ng/L:  Consistent with possible cardiac damage and possible increased clinical   risk. Serial measurements may help to assess extent of myocardial damage.     >50 ng/L: Consistent with cardiac damage, increased clinical risk and  myocardial infarction. Serial measurements may help assess extent of   myocardial damage.      NOTE: Children less than 1 year old may have higher baseline troponin   levels and results should be interpreted in conjunction with the overall   clinical context.     NOTE: Troponin I testing is performed using a different   testing methodology at [facility] than at other   [facility] hospitals. Direct result comparisons should only   be made within the same method.   SERIAL TROPONIN, 1 HOUR - Abnormal    Troponin I, High Sensitivity 28 (*)     Narrative:     Less than 99th percentile of normal range cutoff-  Female and children under 18 years old <14 ng/L; Male <21 ng/L: Negative  Repeat testing  should be performed if clinically indicated.     Female and children under 18 years old 14-50 ng/L; Male 21-50 ng/L:  Consistent with possible cardiac damage and possible increased clinical   risk. Serial measurements may help to assess extent of myocardial damage.     >50 ng/L: Consistent with cardiac damage, increased clinical risk and  myocardial infarction. Serial measurements may help assess extent of   myocardial damage.      NOTE: Children less than 1 year old may have higher baseline troponin   levels and results should be interpreted in conjunction with the overall   clinical context.     NOTE: Troponin I testing is performed using a different   testing methodology at [facility] than at [facility]. Direct result comparisons should only   be made within the same method.   SARS-COV-2 AND INFLUENZA A/B PCR - Normal    Flu A Result Not Detected      Flu B Result Not Detected      Coronavirus 2019, PCR Not Detected      Narrative:     This assay is an FDA-cleared, in vitro diagnostic nucleic acid amplification test for the qualitative detection and differentiation of SARS CoV-2/ Influenza A/B from nasopharyngeal specimens collected from individuals with signs and symptoms of respiratory tract infections, and has been validated for use at [facility]. Negative results do not preclude COVID-19/ Influenza A/B infections and should not be used as the sole basis for diagnosis, treatment, or other management decisions. Testing for SARS CoV-2 is recommended only for patients who meet current clinical and/or epidemiological criteria defined by federal, state, or local public health directives.   MAGNESIUM - Normal    Magnesium 1.83     TROPONIN SERIES- (INITIAL, 1 HR)    Narrative:     The following orders were created for panel order Troponin I Series, High Sensitivity (0, 1 HR).  Procedure                               Abnormality         Status                      ---------                               -----------         ------                     Troponin I, High Sensiti...[[id number]]  Abnormal            Final result               Troponin, High Sensitivi...[[id number]]  Abnormal            Final result                 Please view results for these tests on the individual orders.   URINALYSIS WITH REFLEX CULTURE AND MICROSCOPIC    Narrative:     The following orders were created for panel order Urinalysis with Reflex Culture and Microscopic.  Procedure                               Abnormality         Status                     ---------                               -----------         ------                     Urinalysis with Reflex C...[[id number]]                                                 Extra Urine Gray Tube[[id number]]                                                         Please view results for these tests on the individual orders.   URINALYSIS WITH REFLEX CULTURE AND MICROSCOPIC   EXTRA URINE GRAY TUBE        Medications   sodium chloride 0.9 % bolus 500 mL (500 mL intravenous New Bag 3/18/25 1144)        ED Course as of 03/18/25 1212   Tue Mar 18, 2025   1036 EKG interpretation  Obtained 1032 reviewed 1035  No acute ST elevation depression or signs of acute ischemia nonspecific ST changes low voltage rate 74  QRS 82 QTc 448 no bundle branch block  Per my independent interpretation [SL]   1212 Repeat troponin downtrending, will discharge follows outpatient [SL]      ED Course User Index  [SL] Celestino Calvillo,          Diagnoses as of 03/18/25 1212   Lightheadedness        Brief ED course/MDM:       All results/imaging obtained reviewed and interpreted, results trended/compared with previous levels if available to evaluate for abnormality contributing to today´s presentation  Chart created with voice recognition software, errors may be present due to software's interpretation              Diagnostics interpreted by me:  Per my independendant  interpretation pre-albert read  XR chest no focal infiltrate or other acute process  defer to radiologist final report    XR chest 1 view   Final Result   No evidence of acute intrathoracic abnormality.        Signed by: Behzad Self 3/18/2025 11:30 AM   Dictation workstation:   [id number]      CT head wo IV contrast   Final Result   No acute intracranial hemorrhage or mass-effect.        Bilateral ethmoid and left maxillary sinus disease.        MACRO:   None.        Signed by: Marilu Kong 3/18/2025 11:16 AM   Dictation workstation:   [id number]               1. Lightheadedness         DISPOSITION    Discharge 03/18/2025 12:12:28 PM    PATIENT REFERRED TO:  No follow-up provider specified.    DISCHARGE MEDICATIONS:  New Prescriptions    No medications on file                 All diagnostic, treatment, and disposition decisions were made by myself in conjunction with the ÁNGEL. For all further details of the patient's emergency department visit, please see their documentation.    (Comment: Please note this report has been produced using speech recognition software and may contain errors related to that system including errors in grammar, punctuation, and spelling, as well as words and phrases that may be inappropriate.  If there are any questions or concerns please feel free to contact the dictating provider for clarification.)    Celestino Calvillo, DO     [employer]     Celestino Calvillo, DO  03/18/25 1140       Celestino Calvillo, DO  03/18/25 1212

## 2025-03-18 NOTE — DISCHARGE INSTRUCTIONS
Please follow-up with your primary care doctor.  Your sodium was only mildly low at 133.  You were given IV fluids with sodium in it.  You begin to have worsening symptoms or the onset of new symptoms please return to the emergency department.

## 2025-03-18 NOTE — ED PROVIDER NOTES
"HPI   Chief Complaint   Patient presents with    Dizziness     Pt states that she feels dizzy, light headed, and like she is \"off.\" Pt has had low sodium before and is experiencing similar symptoms       HPI  This is a 89 yr old female presenting to the emergency department for complaints of possible low sodium.  Patient states since yesterday evening she was feeling lightheaded.  She also states that she feels a little bit shaky.  Says that she has felt like this prior when she has had low sodium in the past.  Denies feeling a false sense of motion.  No changes in her vision, no diplopia.  She said she has a mild headache.  No chest pain, shortness of breath, abdominal pain, vomiting or diarrhea.  She has been eating and drinking normally.    Please see HPI for pertinent positive and negative ROS.     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    Generalized osteoarthritis     Past Surgical History:   Procedure Laterality Date    BREAST LUMPECTOMY  2000    CARDIAC CATHETERIZATION N/A 2/14/2025    Procedure: Left Heart Cath, No LV;  Surgeon: Robin Enriquez DO;  Location: [facility] Cardiac Cath Lab;  Service: Cardiovascular;  Laterality: N/A;    CARDIAC CATHETERIZATION N/A 2/14/2025    Procedure: PCI;  Surgeon: Robin Enriquez DO;  Location: [facility] Cardiac Cath Lab;  Service: Cardiovascular;  Laterality: N/A;    OTHER SURGICAL HISTORY  12/05/2022    Lumpectomy    OTHER SURGICAL HISTORY Bilateral 12/05/2022    Knee replacement    TOTAL ABDOMINAL HYSTERECTOMY W/ BILATERAL SALPINGOOPHORECTOMY  12/10/2014    Total Abdominal Hysterectomy With Removal Of Both Ovaries    TOTAL HIP ARTHROPLASTY Bilateral 01/15/2014    Hip Replacement     No " family history on file.  Social History     Tobacco Use    Smoking status: Never    Smokeless tobacco: Never   Vaping Use    Vaping status: Never Used   Substance Use Topics    Alcohol use: Not Currently    Drug use: Never       Physical Exam   ED Triage Vitals [03/18/25 0931]   Temperature Heart Rate Respirations BP   36.2 °C (97.2 °F) 87 18 133/56      Pulse Ox Temp Source Heart Rate Source Patient Position   100 % Temporal -- Sitting      BP Location FiO2 (%)     Right arm --       Physical Exam  GENERAL APPEARANCE: Awake and alert. No acute respiratory distress.   VITAL SIGNS: As per the nurses' triage record.  HEENT: Normocephalic, atraumatic. Extraocular muscles are intact. Conjunctiva are pink. Negative scleral icterus. Mucous membranes are moist. Tongue in the midline. Oropharynx clear, uvula midline.   NECK: Soft, nontender and supple, full gross ROM, no meningeal signs.  CHEST: Nontender to palpation. Clear to auscultation bilaterally. No rales, rhonchi, or wheezing. Symmetric rise and fall of chest wall.   HEART: Clear S1 and S2. Regular rate and rhythm.  Strong and equal pulses in the extremities.  ABDOMEN: Soft, nontender, nondistended  MUSCULOSKELETAL: The calves are nontender to palpation. Full gross active range of motion. Ambulating on own with no acute difficulties  NEUROLOGICAL: Awake, alert and oriented x 3. Motor power intact in the upper and lower extremities. Sensation is intact to light touch in the upper and lower extremities. Patient answering questions appropriately.  NIH stroke scale 0.  Negative test of skew.  IMMUNOLOGICAL: No lymphatic streaking noted  DERMATOLOGIC: Warm and dry without petechiae, rashes, or ecchymosis noted on visible skin.   PYSCH: Cooperative with appropriate mood and affect.    ED Course & MDM   ED Course as of 03/20/25 1147   Tue Mar 18, 2025   1036 EKG interpretation  Obtained 1032 reviewed 1035  No acute ST elevation depression or signs of acute ischemia  nonspecific ST changes low voltage rate 74  QRS 82 QTc 448 no bundle branch block  Per my independent interpretation [SL]   1212 Repeat troponin downtrending, will discharge follows outpatient [SL]      ED Course User Index  [SL] Celestino MEJIA DO Rajinder         Diagnoses as of 03/20/25 1147   Lightheadedness                 No data recorded     [place] Coma Scale Score: 15 (03/18/25 0933 : Betty Mckeon RN)       NIH Stroke Scale: 0 (03/18/25 1000 : Verona Duke PA-C)                   Medical Decision Making  Parts of this chart have been completed using voice recognition software. Please excuse any errors of transcription.  My thought process and reason for plan has been formulated from the time that I saw the patient until the time of disposition and is not specific to one specific moment during their visit and furthermore my MDM encompasses this entire chart and not only this text box.      HPI: Detailed above.    Exam: A medically appropriate exam performed, outlined above, given the known history and presentation.    History obtained from: Patient and patient's daughter who was present with her    EKG: See my supervising physician's EKG interpretation    Medications given during visit:  Medications   sodium chloride 0.9 % bolus 500 mL (0 mL intravenous Stopped 3/18/25 1214)        Diagnostic/tests  Labs Reviewed   CBC WITH AUTO DIFFERENTIAL - Abnormal       Result Value    WBC 4.3 (*)     nRBC 0.0      RBC 2.69 (*)     Hemoglobin 8.9 (*)     Hematocrit 27.3 (*)      (*)     MCH 33.1      MCHC 32.6      RDW 17.4 (*)     Platelets 188      Neutrophils % 66.0      Immature Granulocytes %, Automated 0.9      Lymphocytes % 20.6      Monocytes % 8.8      Eosinophils % 3.2      Basophils % 0.5      Neutrophils Absolute 2.86      Immature Granulocytes Absolute, Automated 0.04      Lymphocytes Absolute 0.89      Monocytes Absolute 0.38      Eosinophils Absolute 0.14      Basophils Absolute 0.02      COMPREHENSIVE METABOLIC PANEL - Abnormal    Glucose 138 (*)     Sodium 133 (*)     Potassium 3.5      Chloride 100      Bicarbonate 24      Anion Gap 13      Urea Nitrogen 23      Creatinine 0.84      eGFR 67      Calcium 8.7      Albumin 3.4      Alkaline Phosphatase 111      Total Protein 5.8 (*)     AST 23      Bilirubin, Total 0.7      ALT 15     SERIAL TROPONIN-INITIAL - Abnormal    Troponin I, High Sensitivity 29 (*)     Narrative:     Less than 99th percentile of normal range cutoff-  Female and children under 18 years old <14 ng/L; Male <21 ng/L: Negative  Repeat testing should be performed if clinically indicated.     Female and children under 18 years old 14-50 ng/L; Male 21-50 ng/L:  Consistent with possible cardiac damage and possible increased clinical   risk. Serial measurements may help to assess extent of myocardial damage.     >50 ng/L: Consistent with cardiac damage, increased clinical risk and  myocardial infarction. Serial measurements may help assess extent of   myocardial damage.      NOTE: Children less than 1 year old may have higher baseline troponin   levels and results should be interpreted in conjunction with the overall   clinical context.     NOTE: Troponin I testing is performed using a different   testing methodology at [facility] than at other   [facility]. Direct result comparisons should only   be made within the same method.   SERIAL TROPONIN, 1 HOUR - Abnormal    Troponin I, High Sensitivity 28 (*)     Narrative:     Less than 99th percentile of normal range cutoff-  Female and children under 18 years old <14 ng/L; Male <21 ng/L: Negative  Repeat testing should be performed if clinically indicated.     Female and children under 18 years old 14-50 ng/L; Male 21-50 ng/L:  Consistent with possible cardiac damage and possible increased clinical   risk. Serial measurements may help to assess extent of myocardial damage.     >50 ng/L: Consistent with cardiac  damage, increased clinical risk and  myocardial infarction. Serial measurements may help assess extent of   myocardial damage.      NOTE: Children less than 1 year old may have higher baseline troponin   levels and results should be interpreted in conjunction with the overall   clinical context.     NOTE: Troponin I testing is performed using a different   testing methodology at [facility] than at other   [facility] hospitals. Direct result comparisons should only   be made within the same method.   SARS-COV-2 AND INFLUENZA A/B PCR - Normal    Flu A Result Not Detected      Flu B Result Not Detected      Coronavirus 2019, PCR Not Detected      Narrative:     This assay is an FDA-cleared, in vitro diagnostic nucleic acid amplification test for the qualitative detection and differentiation of SARS CoV-2/ Influenza A/B from nasopharyngeal specimens collected from individuals with signs and symptoms of respiratory tract infections, and has been validated for use at [facility]. Negative results do not preclude COVID-19/ Influenza A/B infections and should not be used as the sole basis for diagnosis, treatment, or other management decisions. Testing for SARS CoV-2 is recommended only for patients who meet current clinical and/or epidemiological criteria defined by federal, state, or local public health directives.   MAGNESIUM - Normal    Magnesium 1.83     TROPONIN SERIES- (INITIAL, 1 HR)    Narrative:     The following orders were created for panel order Troponin I Series, High Sensitivity (0, 1 HR).  Procedure                               Abnormality         Status                     ---------                               -----------         ------                     Troponin I, High Sensiti...[[id number]]  Abnormal            Final result               Troponin, High Sensitivi...[[id number]]  Abnormal            Final result                 Please view results for these tests on the  individual orders.      XR chest 1 view   Final Result   No evidence of acute intrathoracic abnormality.        Signed by: Behzad Self 3/18/2025 11:30 AM   Dictation workstation:   [id number]      CT head wo IV contrast   Final Result   No acute intracranial hemorrhage or mass-effect.        Bilateral ethmoid and left maxillary sinus disease.        MACRO:   None.        Signed by: Marilu Kong 3/18/2025 11:16 AM   Dictation workstation:   [id number]           Considerations/further MDM:  Patient was seen in conjucntion with my supervising physician,  Dr. Calvillo. Please refer to his note.    Differential diagnosis includes was not limited to electrolyte disturbance versus viral syndrome versus peripheral vertigo.  Very low suspicion for central vertigo as patient has NIH stroke scale score of 0, negative test of skew bilaterally.  No ataxia with walking.    Chest x-ray shows no evidence of acute cardiopulmonary process.  CT head without IV contrast shows no acute cranial hemorrhage or mass effect.  There is evidence of sinusitis.  CBC shows a mild leukopenia and macrocytic anemia that remains fairly unchanged from previous CBCs.  Negative viral swabs.  CMP shows a glucose of 138 with a sodium of 133.  Very mildly low sodium.  She was given 500 mL IV normal saline.  Initial troponin 29.  Repeat troponin 28.  Patient ambulated in the emergency department and did not have any dizziness or lightheadedness.  No ataxia with walking.  She felt improved.  She felt comfortable to plan home with close outpatient follow-up.  Return precautions were discussed.  Patient verbalized understanding felt comfortable to plan home.  She was discharged in stable condition.      Procedure  Procedures     Verona Duke PA-C  03/20/25 1149

## 2025-03-19 LAB
ATRIAL RATE: 74 BPM
P AXIS: 58 DEGREES
P OFFSET: 187 MS
P ONSET: 127 MS
PR INTERVAL: 182 MS
Q ONSET: 218 MS
QRS COUNT: 12 BEATS
QRS DURATION: 82 MS
QT INTERVAL: 404 MS
QTC CALCULATION(BAZETT): 448 MS
QTC FREDERICIA: 433 MS
R AXIS: 60 DEGREES
T AXIS: -6 DEGREES
T OFFSET: 420 MS
VENTRICULAR RATE: 74 BPM

## 2025-03-20 LAB — VWF GP1BM ACTIVITY: NORMAL

## 2025-03-25 ENCOUNTER — TELEPHONE (OUTPATIENT)
Dept: HEMATOLOGY/ONCOLOGY | Facility: CLINIC | Age: 89
End: 2025-03-25
Payer: MEDICARE

## 2025-03-27 LAB
25(OH)D3+25(OH)D2 SERPL-MCNC: 61 NG/ML (ref 30–100)
ALBUMIN SERPL-MCNC: 4 G/DL (ref 3.6–5.1)
ALBUMIN/GLOB SERPL: 1.9 (CALC) (ref 1–2.5)
ALP SERPL-CCNC: 135 U/L (ref 37–153)
ALT SERPL-CCNC: 18 U/L (ref 6–29)
AST SERPL-CCNC: 27 U/L (ref 10–35)
BASOPHILS # BLD AUTO: 41 CELLS/UL (ref 0–200)
BASOPHILS NFR BLD AUTO: 0.7 %
BILIRUB SERPL-MCNC: 0.7 MG/DL (ref 0.2–1.2)
BUN SERPL-MCNC: 26 MG/DL (ref 7–25)
BUN/CREAT SERPL: 30 (CALC) (ref 6–22)
CALCIUM SERPL-MCNC: 8.8 MG/DL (ref 8.6–10.4)
CHLORIDE SERPL-SCNC: 99 MMOL/L (ref 98–110)
CO2 SERPL-SCNC: 27 MMOL/L (ref 20–32)
CREAT SERPL-MCNC: 0.88 MG/DL (ref 0.6–0.95)
DAT POLY-SP REAG RBC QL: NEGATIVE
EGFRCR SERPLBLD CKD-EPI 2021: 63 ML/MIN/1.73M2
EOSINOPHIL # BLD AUTO: 183 CELLS/UL (ref 15–500)
EOSINOPHIL NFR BLD AUTO: 3.1 %
ERYTHROCYTE [DISTWIDTH] IN BLOOD BY AUTOMATED COUNT: 15.8 % (ref 11–15)
FERRITIN SERPL-MCNC: 61 NG/ML (ref 16–288)
GLOBULIN SER CALC-MCNC: 2.1 G/DL (CALC) (ref 1.9–3.7)
GLUCOSE SERPL-MCNC: 95 MG/DL (ref 65–139)
HCT VFR BLD AUTO: 26.6 % (ref 35–45)
HGB BLD-MCNC: 8.8 G/DL (ref 11.7–15.5)
IRON SATN MFR SERPL: 11 % (CALC) (ref 16–45)
IRON SERPL-MCNC: 35 MCG/DL (ref 45–160)
LYMPHOCYTES # BLD AUTO: 1198 CELLS/UL (ref 850–3900)
LYMPHOCYTES NFR BLD AUTO: 20.3 %
MCH RBC QN AUTO: 33.6 PG (ref 27–33)
MCHC RBC AUTO-ENTMCNC: 33.1 G/DL (ref 32–36)
MCV RBC AUTO: 101.5 FL (ref 80–100)
MONOCYTES # BLD AUTO: 620 CELLS/UL (ref 200–950)
MONOCYTES NFR BLD AUTO: 10.5 %
NEUTROPHILS # BLD AUTO: 3859 CELLS/UL (ref 1500–7800)
NEUTROPHILS NFR BLD AUTO: 65.4 %
PLATELET # BLD AUTO: 234 THOUSAND/UL (ref 140–400)
PMV BLD REES-ECKER: 9.2 FL (ref 7.5–12.5)
POTASSIUM SERPL-SCNC: 3.6 MMOL/L (ref 3.5–5.3)
PROT SERPL-MCNC: 6.1 G/DL (ref 6.1–8.1)
RBC # BLD AUTO: 2.62 MILLION/UL (ref 3.8–5.1)
SODIUM SERPL-SCNC: 134 MMOL/L (ref 135–146)
TIBC SERPL-MCNC: 311 MCG/DL (CALC) (ref 250–450)
URATE SERPL-MCNC: 4.2 MG/DL (ref 2.5–7)
WBC # BLD AUTO: 5.9 THOUSAND/UL (ref 3.8–10.8)

## 2025-04-01 ENCOUNTER — APPOINTMENT (OUTPATIENT)
Dept: HEMATOLOGY/ONCOLOGY | Facility: CLINIC | Age: 89
End: 2025-04-01
Payer: MEDICARE

## 2025-04-01 DIAGNOSIS — D64.9 ACUTE ANEMIA: Primary | ICD-10-CM

## 2025-04-01 RX ORDER — ALBUTEROL SULFATE 0.83 MG/ML
3 SOLUTION RESPIRATORY (INHALATION) AS NEEDED
OUTPATIENT
Start: 2025-04-15

## 2025-04-01 RX ORDER — DIPHENHYDRAMINE HYDROCHLORIDE 50 MG/ML
50 INJECTION, SOLUTION INTRAMUSCULAR; INTRAVENOUS AS NEEDED
OUTPATIENT
Start: 2025-04-15

## 2025-04-01 RX ORDER — EPINEPHRINE 0.3 MG/.3ML
0.3 INJECTION SUBCUTANEOUS EVERY 5 MIN PRN
OUTPATIENT
Start: 2025-04-15

## 2025-04-01 RX ORDER — HEPARIN 100 UNIT/ML
500 SYRINGE INTRAVENOUS AS NEEDED
OUTPATIENT
Start: 2025-04-15

## 2025-04-01 RX ORDER — FAMOTIDINE 10 MG/ML
20 INJECTION, SOLUTION INTRAVENOUS ONCE AS NEEDED
OUTPATIENT
Start: 2025-04-15

## 2025-04-01 RX ORDER — HEPARIN SODIUM,PORCINE/PF 10 UNIT/ML
50 SYRINGE (ML) INTRAVENOUS AS NEEDED
OUTPATIENT
Start: 2025-04-15

## 2025-04-02 ENCOUNTER — APPOINTMENT (OUTPATIENT)
Dept: CARDIOLOGY | Facility: CLINIC | Age: 89
End: 2025-04-02
Payer: MEDICARE

## 2025-04-04 ENCOUNTER — TELEPHONE (OUTPATIENT)
Dept: HEMATOLOGY/ONCOLOGY | Facility: CLINIC | Age: 89
End: 2025-04-04
Payer: MEDICARE

## 2025-04-04 ENCOUNTER — APPOINTMENT (OUTPATIENT)
Dept: HEMATOLOGY/ONCOLOGY | Facility: CLINIC | Age: 89
End: 2025-04-04
Payer: MEDICARE

## 2025-04-11 ENCOUNTER — OFFICE VISIT (OUTPATIENT)
Dept: SURGICAL ONCOLOGY | Facility: CLINIC | Age: 89
End: 2025-04-11
Payer: MEDICARE

## 2025-04-11 VITALS
DIASTOLIC BLOOD PRESSURE: 63 MMHG | WEIGHT: 140.2 LBS | HEART RATE: 70 BPM | BODY MASS INDEX: 23.93 KG/M2 | HEIGHT: 64 IN | SYSTOLIC BLOOD PRESSURE: 114 MMHG

## 2025-04-11 DIAGNOSIS — Q45.3 ABNORMALITY OF PANCREATIC DUCT: Primary | ICD-10-CM

## 2025-04-11 DIAGNOSIS — K86.2 PANCREATIC CYST (HHS-HCC): ICD-10-CM

## 2025-04-11 DIAGNOSIS — D64.9 ANEMIA, UNSPECIFIED TYPE: ICD-10-CM

## 2025-04-11 PROCEDURE — 99204 OFFICE O/P NEW MOD 45 MIN: CPT | Performed by: PHYSICIAN ASSISTANT

## 2025-04-11 PROCEDURE — 3074F SYST BP LT 130 MM HG: CPT | Performed by: PHYSICIAN ASSISTANT

## 2025-04-11 PROCEDURE — 1159F MED LIST DOCD IN RCRD: CPT | Performed by: PHYSICIAN ASSISTANT

## 2025-04-11 PROCEDURE — 1157F ADVNC CARE PLAN IN RCRD: CPT | Performed by: PHYSICIAN ASSISTANT

## 2025-04-11 PROCEDURE — 3078F DIAST BP <80 MM HG: CPT | Performed by: PHYSICIAN ASSISTANT

## 2025-04-11 PROCEDURE — 99214 OFFICE O/P EST MOD 30 MIN: CPT | Performed by: PHYSICIAN ASSISTANT

## 2025-04-11 PROCEDURE — 1126F AMNT PAIN NOTED NONE PRSNT: CPT | Performed by: PHYSICIAN ASSISTANT

## 2025-04-11 RX ORDER — FUROSEMIDE 20 MG/1
1 TABLET ORAL
COMMUNITY
Start: 2024-12-23

## 2025-04-11 RX ORDER — POTASSIUM CHLORIDE 750 MG/1
TABLET, EXTENDED RELEASE ORAL
COMMUNITY
Start: 2025-04-05

## 2025-04-11 RX ORDER — OMEPRAZOLE 20 MG/1
1 CAPSULE, DELAYED RELEASE ORAL
COMMUNITY
Start: 2025-03-27

## 2025-04-11 ASSESSMENT — PAIN SCALES - GENERAL: PAINLEVEL_OUTOF10: 0-NO PAIN

## 2025-04-11 NOTE — PROGRESS NOTES
"Subjective   Ms. Medrano is an 89-year-old female who is referred by Mary Hall CNP for pancreatic cysts with MPD dilatation.     In October of 2024, she was hospitalized with choledocholithiasis. MRI/MRCP on 10/7/24 showed a few 2-3 mm filling defects in the distal CBD with no biliary ductal dilatation, diffuse GB wall thickening with sludge, segmental pancreatic ductal dilatation in the up to 7 mm without transition and scattered small pancreatic cysts. She had an ERCP and eventually a cholecystectomy.     She is here today to discuss surveillance imaging. She was told by the [facility] to have a repeat MRI/MRCP in 6 months.     She denies a personal history of acute pancreatitis. She denies abdominal pain, early satiety, poor appetite, jaundice or unintentional weight loss.     Medical and surgical history: Choledocholithiasis s/p ERCP and lap cholecystectomy 10/11/24, moderate pulmonary hypertension, PFO, diastolic heart failure, STEMI s/p PCI to RCA with EMY x 2 (2/14/25) on DAAPT, rheumatoid arthritis, atrial flutter, hypothyroid, GERD, remote breast cancer s/p left lumpectomy, chronic anemia, hysterectomy, appendectomy.   Family history: No PDAC.   Social history: Non-smoker. No alcohol use. No illicits.     Objective     /63   Pulse 70   Ht 1.626 m (5' 4\")   Wt 63.6 kg (140 lb 3.2 oz)   BMI 24.07 kg/m²      Physical Exam  General: in no acute distress, comfortable, uses a walker for ambulation   Eyes: no pallor or scleral icterus  Respiratory: even, unlabored  Gastrointestinal: non-distended, abdomen soft, non-tender, no masses   Musculoskeletal: normal gate, no deformities   Integumentary: no concerning lesions, no jaundice  Neurologic: no gross deficits  Psychiatric: cognition intact, mood appropriate    Assessment/Plan   Ms. Medrano is an 89-year-old female who is referred by Mary Hall CNP for pancreatic cysts with MPD dilatation.     PLAN: I do not have her imaging from the " [facility] to personally review. I will request it. I described my impression based on the MRI/MRCP report. 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 There is mention of segmental dilation of the MPD in the tail up to 7 mm. This could represent a focal main duct or mixed type IPMN.     I discussed surveillance vs no surveillance (given her age). She would like surveillance and I arranged the MRI/MRCP. I will call her once the results are available.       Lucille Fuentes PA-C

## 2025-04-15 ENCOUNTER — OFFICE VISIT (OUTPATIENT)
Dept: HEMATOLOGY/ONCOLOGY | Facility: CLINIC | Age: 89
End: 2025-04-15
Payer: MEDICARE

## 2025-04-15 VITALS
BODY MASS INDEX: 23.99 KG/M2 | DIASTOLIC BLOOD PRESSURE: 63 MMHG | WEIGHT: 139.77 LBS | RESPIRATION RATE: 18 BRPM | HEART RATE: 79 BPM | TEMPERATURE: 97.3 F | SYSTOLIC BLOOD PRESSURE: 133 MMHG | OXYGEN SATURATION: 98 %

## 2025-04-15 DIAGNOSIS — R19.5 OCCULT BLOOD IN STOOLS: ICD-10-CM

## 2025-04-15 DIAGNOSIS — D64.9 SYMPTOMATIC ANEMIA: ICD-10-CM

## 2025-04-15 DIAGNOSIS — T14.8XXA BRUISING: ICD-10-CM

## 2025-04-15 PROCEDURE — 3078F DIAST BP <80 MM HG: CPT | Performed by: NURSE PRACTITIONER

## 2025-04-15 PROCEDURE — 1159F MED LIST DOCD IN RCRD: CPT | Performed by: NURSE PRACTITIONER

## 2025-04-15 PROCEDURE — 99214 OFFICE O/P EST MOD 30 MIN: CPT | Performed by: NURSE PRACTITIONER

## 2025-04-15 PROCEDURE — 3075F SYST BP GE 130 - 139MM HG: CPT | Performed by: NURSE PRACTITIONER

## 2025-04-15 PROCEDURE — 1157F ADVNC CARE PLAN IN RCRD: CPT | Performed by: NURSE PRACTITIONER

## 2025-04-15 PROCEDURE — 1126F AMNT PAIN NOTED NONE PRSNT: CPT | Performed by: NURSE PRACTITIONER

## 2025-04-15 ASSESSMENT — ENCOUNTER SYMPTOMS
ADENOPATHY: 0
CONSTIPATION: 1
ARTHRALGIAS: 1
BRUISES/BLEEDS EASILY: 0
CARDIOVASCULAR NEGATIVE: 1
NEUROLOGICAL NEGATIVE: 1
FATIGUE: 1
RESPIRATORY NEGATIVE: 1
HEMATOLOGIC/LYMPHATIC NEGATIVE: 1
BACK PAIN: 1

## 2025-04-15 ASSESSMENT — PAIN SCALES - GENERAL: PAINLEVEL_OUTOF10: 0-NO PAIN

## 2025-04-15 NOTE — PROGRESS NOTES
"Patient ID: Michell Medrano is a 89 y.o. female.    Primary Care Provider: Zack Nieves MD  Visit Type: Follow Up      Subjective    HPI  89-year-old  female presents for evaluation of symptomatic anemia.   Per medical record review anemia dates back to February 2018 with hemoglobin 11.8 increased RDW.  History of iron infusions and blood transfusions through the years.      PMH of choledocholithiasis and chronic cholecystitis s/p recent laparoscopic cholecystectomy 10/11/24 c/b atrial flutter, RA, pancreatic cyst, HTN, chronic diastolic HF, KAROLINA, PFO per TTE 10/2024, hypothyroidism, GERD, chronic venous insufficiency, remote breast CA, venous insufficiency, gout and rheumatoid arthritis.  Notable decrease in kidney function.  Patient on methotrexate.      GI scopes planned in April 2025. Positive blood occult found in 11/2024 during hospitalization.  Delay due to MI      Hospitalized at [facility] 10/6/2024 Received IV iron. Cholecystitis.  Cholecystectomy performed.     Hospitalized [facility] 11/21/2024 Possible allergic RXN to flecainide, Eliquis, or xarelto. Received Epi. Anemia, Occult blood positive. Outpatient GI workup recommended. This was delayed due to MI and maintained on Plavix.   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  She will follow cardiology for anticoagulation management.     Hospitalized 2/17/2025 for ST elevation myocardial infarction (STEMI) and Stent placed. Hypotension. Follows cardiology.  Now on Plavix and ASA baby 81 mg.      ERCP 10/10/2024   Impression:     1. Choledocholithiasis s/p biliary sphincterotomy                            and stone extraction as detailed above.                            2. Filling of the cystic duct.                            3. Normal ventral PD at the level of the head.      HISTORY OF PRESENT ILLNESS      ID Statement: Michell Medrano is an 89 year old female      Chief Complaint: \"feeling pretty good\"     Interval History: "   Michell presents alone for follow up.  She lives in [place].  She was in the hospital for a heart attack.  She comments fatigue before, feel better than before.  She feels overall improved.  She reports that in 2000 she had breast cancer and is a breast cancer survivor, and did have chemo and radiation. Left breast Lumpectomy with lymph node involvement. Hormone ER positive.  Took anastrozole for about 3 years.     She reports reports chronic anemia intermittent with blood and iron transfusions after all surgeries her entire life. Taking oral iron daily for many years. Some constipation, however manageable. UTI recently, no infection in over 5 years. On Estradiol more regularly now which helps prevent UTI, localized cream only  Lost weight after most recent surgery. Maintaining. Very good appetite.   Lightheadedness ongoing for several years, occasionally still occurs.  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 Use a walker. Vascular insufficiency.  Feels a little unsteady.  She denies bone pain at this time.   She denies any bleeding or bruising.  Denies F/C/recent illness/infection, drenching night sweats, unintentional weight loss, anorexia/loss of appetite, HA, dizziness, PICA, acute changes in vision/hearing, palpitations, CP, SOB, n/v/d/abd pain, gopi bleeding, melena, urinary issues now, haematuria, LAD, bruising, sores, itchy or rashes.  Current labs WBC 5.9, hgb 8.8, plt 234 with ferritan 61, iron 31 and % sat 11.  We have arranged for IV iron to begin next week.   She continues on methotrexate for RA.  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   They are planning on getting MRCP to further evaluate pancreatic cysts.   Mammogram 2/11/25 normal.  CT of head 3/18/25 normal with exception to sinus issues. B12 was good 2/12/25.       PAST/CURRENT HISTORY      MEDICAL/SURGICAL HISTORY  Medical History        Past Medical History:   Diagnosis Date    Breast cancer (Multi) 2000     Hx antineoplastic chemo     Personal history of irradiation     Personal history of other diseases of the musculoskeletal system and connective tissue 2016     History of rheumatoid arthritis    Polyosteoarthritis, unspecified 2018     Generalized osteoarthritis         Surgical History         Past Surgical History:   Procedure Laterality Date    BREAST LUMPECTOMY   2000    CARDIAC CATHETERIZATION N/A 2025     Procedure: Left Heart Cath, No LV;  Surgeon: Robin Enriquez DO;  Location: [facility] Cardiac Cath Lab;  Service: Cardiovascular;  Laterality: N/A;    CARDIAC CATHETERIZATION N/A 2025     Procedure: PCI;  Surgeon: Robin Enriquez DO;  Location: [facility] Cardiac Cath Lab;  Service: Cardiovascular;  Laterality: N/A;    OTHER SURGICAL HISTORY   2022     Lumpectomy    OTHER SURGICAL HISTORY Bilateral 2022     Knee replacement    TOTAL ABDOMINAL HYSTERECTOMY W/ BILATERAL SALPINGOOPHORECTOMY   12/10/2014     Total Abdominal Hysterectomy With Removal Of Both Ovaries    TOTAL HIP ARTHROPLASTY Bilateral 01/15/2014     Hip Replacement            SOCIAL HISTORY  -Lives alone at [facility] (one daughter healthy)  -Work place: Retired    -Tobacco/smokeless use: Never  -Alcohol: Denies   -Illicit drug or marijuana use: Denies   -Yazidi or Spiritual beliefs: Uatsdin   -Social Determinates of Health Concerns: NA     FAMILY HISTORY  -Mom lived to be 105 years old arthritis  -dad  at age 86 years old prostate cancer, colostomy    -Younger brother new diagnosis Multiple Myeloma    -No other known history of hematologic, bleeding, clotting, autoimmune, genetic, or malignant disorders in the family.      OCCUPATIONAL/ENVIRONMENTAL HISTORY/EXPOSURES:  -None reported     Active Problems, Allergy List, Medication List, and PMH/PSH/FH/Social Hx have been reviewed and reconciled in chart. Updates made when necessary.      REVIEW OF SYSTEMS   A review of systems has been  completed and are negative for complaints except what is stated in the assessment, HPI, IH, ROS, and/or past medical history.     ALLERGIES AND MEDICATIONS      Allergies and Intolerances:   RX Allergies         Allergies   Allergen Reactions    Apixaban Anaphylaxis    Xarelto [Rivaroxaban] Shortness of breath    Doxycycline Unknown    Erythromycin Unknown    Erythromycin Base Unknown    Methen-M.Blue-S.Phos-Phsal-Hyo Unknown       Uribel CAPS    Naproxen Unknown    Penicillins Unknown    Prednisone Other       Flushed    Pseudoephedrine Unknown    Tetracycline Unknown    Cephalosporins Hives    Red Dye Rash         Medication Profile:        Current Outpatient Medications   Medication Instructions    acetaminophen (TYLENOL) 650 mg, Every 6 hours PRN    allopurinol (ZYLOPRIM) 200 mg, oral, Daily    aspirin 81 mg chewable tablet Chew and swallow 1 tablet (81 mg) once daily.    atorvastatin (LIPITOR) 40 mg, oral, Nightly    clopidogrel (Plavix) 75 mg tablet Take 1 tablet (75 mg) by mouth once daily. Do not start before February 18, 2025.    ergocalciferol (VITAMIN D-2) 1,250 mcg, oral, Every 30 days, Taking 2 times a month    estradiol (ESTRACE) 1 g, vaginal, 2 times weekly    ferrous sulfate 65 mg, Daily with breakfast    folic acid (FOLVITE) 1 mg, oral, Daily    gabapentin (NEURONTIN) 300 mg, oral, Nightly    levothyroxine (SYNTHROID, LEVOXYL) 50 mcg, Daily    methotrexate (TREXALL) 20 mg, oral, Once Weekly, Follow directions carefully, and ask to explain any part you do not understand. Take exactly as directed.    metoprolol succinate XL (TOPROL-XL) 25 mg, oral, Daily, Do not crush or chew.    omeprazole OTC (PRILOSEC OTC) 20 mg, Daily before breakfast    polyethylene glycol (GLYCOLAX, MIRALAX) 17 g, Daily    torsemide (DEMADEX) 20 mg, oral, Daily      Available Vaccination Record:        Immunization History   Administered Date(s) Administered    COVID-19, mRNA, LNP-S, PF, 30 mcg/0.3 mL dose 01/14/2021,  02/04/2021, 01/03/2022    Flu vaccine, trivalent, preservative free, HIGH-DOSE, age 65y+ (Fluzone) 09/19/2018    Influenza, Seasonal, Quadrivalent, Adjuvanted 10/17/2023    Novel influenza-H1N1-09, preservative-free 12/31/2009    RESPIRATORY SYNCYTIAL VIRUS (RSV), ELIGIBLE PREGNANT PTS, 0.5 ML (ABRYSVO) 01/26/2     Review of Systems   Constitutional:  Positive for fatigue.   HENT:  Negative.     Respiratory: Negative.     Cardiovascular: Negative.    Gastrointestinal:  Positive for constipation.        Heme positive stool, not observed by patient   Musculoskeletal:  Positive for arthralgias and back pain.   Skin: Negative.  Negative for itching and rash.   Neurological: Negative.    Hematological: Negative.  Negative for adenopathy. Does not bruise/bleed easily.      Objective   BSA: 1.69 meters squared  /63 (BP Location: Right arm, Patient Position: Sitting, BP Cuff Size: Adult)   Pulse 79   Temp 36.3 °C (97.3 °F)   Resp 18   Wt 63.4 kg (139 lb 12.4 oz)   SpO2 98%   BMI 23.99 kg/m²      has a past medical history of Breast cancer (Multi) (2000), antineoplastic chemo (2000), Personal history of irradiation (2000), Personal history of other diseases of the musculoskeletal system and connective tissue (06/06/2016), and Polyosteoarthritis, unspecified (02/02/2018).   has a past surgical history that includes Total hip arthroplasty (Bilateral, 01/15/2014); Other surgical history (12/05/2022); Other surgical history (Bilateral, 12/05/2022); Total abdominal hysterectomy w/ bilateral salpingoophorectomy (12/10/2014); Breast lumpectomy (2000); Cardiac catheterization (N/A, 2/14/2025); and Cardiac catheterization (N/A, 2/14/2025).  No family history on file.      Michell Medrano  reports that she has never smoked. She has never used smokeless tobacco.  She  reports that she does not currently use alcohol.  She  reports no history of drug use.    Physical Exam  Constitutional:       Appearance: Normal appearance.    Cardiovascular:      Rate and Rhythm: Normal rate and regular rhythm.      Pulses: Normal pulses.      Heart sounds: Normal heart sounds.   Pulmonary:      Effort: Pulmonary effort is normal.      Breath sounds: Normal breath sounds. No wheezing or rales.   Abdominal:      General: There is no distension.      Palpations: There is no mass.      Tenderness: There is no abdominal tenderness. There is no guarding or rebound.   Musculoskeletal:         General: Normal range of motion.   Lymphadenopathy:      Cervical: No cervical adenopathy.   Skin:     Coloration: Skin is not jaundiced or pale.   Neurological:      Motor: No weakness.     WBC   Date/Time Value Ref Range Status   03/18/2025 09:53 AM 4.3 (L) 4.4 - 11.3 x10*3/uL Final   02/17/2025 05:22 AM 3.9 (L) 4.4 - 11.3 x10*3/uL Final   02/16/2025 05:15 AM 3.9 (L) 4.4 - 11.3 x10*3/uL Final     WHITE BLOOD CELL COUNT   Date/Time Value Ref Range Status   03/26/2025 03:44 PM 5.9 3.8 - 10.8 Thousand/uL Final     nRBC   Date Value Ref Range Status   03/18/2025 0.0 0.0 - 0.0 /100 WBCs Final   02/17/2025 0.5 (H) 0.0 - 0.0 /100 WBCs Final   02/16/2025 0.0 0.0 - 0.0 /100 WBCs Final     RBC   Date Value Ref Range Status   03/18/2025 2.69 (L) 4.00 - 5.20 x10*6/uL Final   02/17/2025 2.74 (L) 4.00 - 5.20 x10*6/uL Final   02/16/2025 2.81 (L) 4.00 - 5.20 x10*6/uL Final     RED BLOOD CELL COUNT   Date Value Ref Range Status   03/26/2025 2.62 (L) 3.80 - 5.10 Million/uL Final     Hemoglobin   Date Value Ref Range Status   03/18/2025 8.9 (L) 12.0 - 16.0 g/dL Final   02/17/2025 9.0 (L) 12.0 - 16.0 g/dL Final   02/16/2025 9.1 (L) 12.0 - 16.0 g/dL Final     HEMOGLOBIN   Date Value Ref Range Status   03/26/2025 8.8 (L) 11.7 - 15.5 g/dL Final     Hematocrit   Date Value Ref Range Status   03/18/2025 27.3 (L) 36.0 - 46.0 % Final   02/17/2025 27.5 (L) 36.0 - 46.0 % Final   02/16/2025 27.4 (L) 36.0 - 46.0 % Final     HEMATOCRIT   Date Value Ref Range Status   03/26/2025 26.6 (L) 35.0 - 45.0  % Final     MCV   Date/Time Value Ref Range Status   03/26/2025 03:44 .5 (H) 80.0 - 100.0 fL Final   03/18/2025 09:53  (H) 80 - 100 fL Final   02/17/2025 05:22  80 - 100 fL Final   02/16/2025 05:15 AM 98 80 - 100 fL Final     MCH   Date/Time Value Ref Range Status   03/26/2025 03:44 PM 33.6 (H) 27.0 - 33.0 pg Final   03/18/2025 09:53 AM 33.1 26.0 - 34.0 pg Final   02/17/2025 05:22 AM 32.8 26.0 - 34.0 pg Final   02/16/2025 05:15 AM 32.4 26.0 - 34.0 pg Final     MCHC   Date/Time Value Ref Range Status   03/26/2025 03:44 PM 33.1 32.0 - 36.0 g/dL Final     Comment:     For adults, a slight decrease in the calculated MCHC  value (in the range of 30 to 32 g/dL) is most likely  not clinically significant; however, it should be  interpreted with caution in correlation with other  red cell parameters and the patient's clinical  condition.     03/18/2025 09:53 AM 32.6 32.0 - 36.0 g/dL Final   02/17/2025 05:22 AM 32.7 32.0 - 36.0 g/dL Final   02/16/2025 05:15 AM 33.2 32.0 - 36.0 g/dL Final     RDW   Date/Time Value Ref Range Status   03/26/2025 03:44 PM 15.8 (H) 11.0 - 15.0 % Final   03/18/2025 09:53 AM 17.4 (H) 11.5 - 14.5 % Final   02/17/2025 05:22 AM 17.7 (H) 11.5 - 14.5 % Final   02/16/2025 05:15 AM 17.6 (H) 11.5 - 14.5 % Final     Platelets   Date/Time Value Ref Range Status   03/18/2025 09:53  150 - 450 x10*3/uL Final   02/17/2025 05:22  150 - 450 x10*3/uL Final   02/16/2025 05:15  150 - 450 x10*3/uL Final     PLATELET COUNT   Date/Time Value Ref Range Status   03/26/2025 03:44  140 - 400 Thousand/uL Final     MPV   Date/Time Value Ref Range Status   03/26/2025 03:44 PM 9.2 7.5 - 12.5 fL Final     Neutrophils %   Date/Time Value Ref Range Status   03/18/2025 09:53 AM 66.0 40.0 - 80.0 % Final   02/17/2025 05:22 AM 53.3 40.0 - 80.0 % Final   02/16/2025 05:15 AM 56.7 40.0 - 80.0 % Final     Immature Granulocytes %, Automated   Date/Time Value Ref Range Status   03/18/2025 09:53 AM  0.9 0.0 - 0.9 % Final     Comment:     Immature Granulocyte Count (IG) includes promyelocytes, myelocytes and metamyelocytes but does not include bands. Percent differential counts (%) should be interpreted in the context of the absolute cell counts (cells/UL).   02/17/2025 05:22 AM 0.8 0.0 - 0.9 % Final     Comment:     Immature Granulocyte Count (IG) includes promyelocytes, myelocytes and metamyelocytes but does not include bands. Percent differential counts (%) should be interpreted in the context of the absolute cell counts (cells/UL).   02/16/2025 05:15 AM 0.8 0.0 - 0.9 % Final     Comment:     Immature Granulocyte Count (IG) includes promyelocytes, myelocytes and metamyelocytes but does not include bands. Percent differential counts (%) should be interpreted in the context of the absolute cell counts (cells/UL).     Lymphocytes %   Date/Time Value Ref Range Status   03/18/2025 09:53 AM 20.6 13.0 - 44.0 % Final   02/17/2025 05:22 AM 32.3 13.0 - 44.0 % Final   02/16/2025 05:15 AM 30.6 13.0 - 44.0 % Final     LYMPHOCYTES   Date/Time Value Ref Range Status   03/26/2025 03:44 PM 20.3 % Final     Monocytes %   Date/Time Value Ref Range Status   03/18/2025 09:53 AM 8.8 2.0 - 10.0 % Final   02/17/2025 05:22 AM 9.8 2.0 - 10.0 % Final   02/16/2025 05:15 AM 9.6 2.0 - 10.0 % Final     MONOCYTES   Date/Time Value Ref Range Status   03/26/2025 03:44 PM 10.5 % Final     Eosinophils %   Date/Time Value Ref Range Status   03/18/2025 09:53 AM 3.2 0.0 - 6.0 % Final   02/17/2025 05:22 AM 2.8 0.0 - 6.0 % Final   02/16/2025 05:15 AM 1.8 0.0 - 6.0 % Final     EOSINOPHILS   Date/Time Value Ref Range Status   03/26/2025 03:44 PM 3.1 % Final     Basophils %   Date/Time Value Ref Range Status   03/18/2025 09:53 AM 0.5 0.0 - 2.0 % Final   02/17/2025 05:22 AM 1.0 0.0 - 2.0 % Final   02/16/2025 05:15 AM 0.5 0.0 - 2.0 % Final     BASOPHILS   Date/Time Value Ref Range Status   03/26/2025 03:44 PM 0.7 % Final     Neutrophils Absolute    Date/Time Value Ref Range Status   03/18/2025 09:53 AM 2.86 1.60 - 5.50 x10*3/uL Final     Comment:     Percent differential counts (%) should be interpreted in the context of the absolute cell counts (cells/uL).   02/17/2025 05:22 AM 2.06 1.60 - 5.50 x10*3/uL Final     Comment:     Percent differential counts (%) should be interpreted in the context of the absolute cell counts (cells/uL).   02/16/2025 05:15 AM 2.18 1.60 - 5.50 x10*3/uL Final     Comment:     Percent differential counts (%) should be interpreted in the context of the absolute cell counts (cells/uL).     Immature Granulocytes Absolute, Automated   Date/Time Value Ref Range Status   03/18/2025 09:53 AM 0.04 0.00 - 0.50 x10*3/uL Final   02/17/2025 05:22 AM 0.03 0.00 - 0.50 x10*3/uL Final   02/16/2025 05:15 AM 0.03 0.00 - 0.50 x10*3/uL Final     Lymphocytes Absolute   Date/Time Value Ref Range Status   03/18/2025 09:53 AM 0.89 0.80 - 3.00 x10*3/uL Final   02/17/2025 05:22 AM 1.25 0.80 - 3.00 x10*3/uL Final   02/16/2025 05:15 AM 1.18 0.80 - 3.00 x10*3/uL Final     Monocytes Absolute   Date/Time Value Ref Range Status   03/18/2025 09:53 AM 0.38 0.05 - 0.80 x10*3/uL Final   02/17/2025 05:22 AM 0.38 0.05 - 0.80 x10*3/uL Final   02/16/2025 05:15 AM 0.37 0.05 - 0.80 x10*3/uL Final     Eosinophils Absolute   Date/Time Value Ref Range Status   03/18/2025 09:53 AM 0.14 0.00 - 0.40 x10*3/uL Final   02/17/2025 05:22 AM 0.11 0.00 - 0.40 x10*3/uL Final   02/16/2025 05:15 AM 0.07 0.00 - 0.40 x10*3/uL Final     ABSOLUTE EOSINOPHILS   Date/Time Value Ref Range Status   03/26/2025 03:44  15 - 500 cells/uL Final     Basophils Absolute   Date/Time Value Ref Range Status   03/18/2025 09:53 AM 0.02 0.00 - 0.10 x10*3/uL Final   02/17/2025 05:22 AM 0.04 0.00 - 0.10 x10*3/uL Final   02/16/2025 05:15 AM 0.02 0.00 - 0.10 x10*3/uL Final     ABSOLUTE BASOPHILS   Date/Time Value Ref Range Status   03/26/2025 03:44 PM 41 0 - 200 cells/uL Final       No components found for:  "\"PT\"  aPTT   Date/Time Value Ref Range Status   02/12/2025 02:26 PM 30 27 - 38 seconds Final   03/14/2019 01:06 PM 30 28 - 38 sec Final     Comment:       THE APTT IS NO LONGER USED FOR MONITORING     UNFRACTIONATED HEPARIN THERAPY.    FOR MONITORING HEPARIN THERAPY,     USE THE HEPARIN ASSAY.     11/23/2018 05:48 AM 27 (L) 28 - 38 sec Final     Comment:      Note new reference range as of 10/23/2018.    THE APTT IS NO LONGER USED FOR MONITORING     UNFRACTIONATED HEPARIN THERAPY.    FOR MONITORING HEPARIN THERAPY,     USE THE HEPARIN ASSAY.     11/22/2018 08:33 AM 28 28 - 38 sec Final     Comment:      Note new reference range as of 10/23/2018.    THE APTT IS NO LONGER USED FOR MONITORING     UNFRACTIONATED HEPARIN THERAPY.    FOR MONITORING HEPARIN THERAPY,     USE THE HEPARIN ASSAY.         Assessment/Plan    Assessment and Plan:   #1. Macrocytic anemia   #2. Leukopenia   89-year-old  female presents for evaluation of symptomatic anemia.   Per medical record review anemia dates back to February 2018 with hemoglobin 11.8 increased RDW.  History of iron infusions and blood transfusions through the years.   Iron levels are low and would need IV iron at this time.  Will check labs in 3 months and see her back in 6 months.      PMH of choledocholithiasis and chronic cholecystitis s/p recent laparoscopic cholecystectomy 10/11/24 c/b atrial flutter, RA, pancreatic cyst, HTN, chronic diastolic HF, KAROLINA, PFO per TTE 10/2024, hypothyroidism, GERD, chronic venous insufficiency, remote breast CA, venous insufficiency, gout and rheumatoid arthritis.       Patient on methotrexate, which is known to suppress bone marrow.   Cholecystectomy performed in October 2024.  Hospitalization November 2024 for acute anaphylaxis.  Hospitalized February 17, 2025 for STEMI.  Stent placed now on Plavix and aspirin.  GI scopes planned in April 2025. Positive blood occult found in 11/2024 during hospitalization.       Continue oral iron " at this time.  Patient has rheumatoid arthritis and is on methotrexate which will contribute to anemia.    October 2024 infusions while inpatient.     Workup discussion:  Taking iron daily for years, likely not absorbing and would not want to increase dose due to constipation  Patient will go next week when she sees her family practice provider and have labs drawn.  If iron is needed we will order IV.  Patient has colonoscopy planned at [facility] on April 2, 2025.  Discussed results of workup.  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  Orchid light chain 4.27 with positive ratio 1.69.  No specific immunophenotypic or morphologic abnormality.  However, we will monitor.  MYD 88 will be drawn to assess for genetic mutation.  Myeloid malignancy panel negative for JAK2 V6 17F, exon 12 mutation, CALR mutation or MPL mutation.  Negative for von Willebrand.  B12 above 500 she will take B12 and folic acid daily.  Negative CRP and sed rate slightly elevated at 52.  Likely this is inflammatory.  Patient is on methotrexate which can also cause myelosuppression.  SPEP negative.  While inpatient CBC shows hemoglobin of 9, RBCs 2.74, WBC 3.9 and NRBC 0.5.  We will repeat this blood work.        Follow up:     RTC:  -6 months with Yamile Briggs PA-C     Medications:  -Take B12/folic acid daily  -IV iron as needed       Referral(s):  -GI plan for colonoscopy in the summer          Diagnoses and all orders for this visit:  Symptomatic anemia  -     Clinic Appointment Request Follow Up (new provider); YAMILE BRIGGS ([place])  -     CBC and Auto Differential; Future  -     Ferritin; Future  -     Iron and TIBC; Future  -     Clinic Appointment Request Follow up; Future  -     CBC and Auto Differential; Future  -     Comprehensive Metabolic Panel; Future  -     Iron and TIBC; Future  -     Ferritin; Future  -     Vitamin B12; Future  Bruising  -     Clinic Appointment Request Follow Up (new provider);  YAMLIE BRIGGS ([place])  Occult blood in stools  -     Clinic Appointment Request Follow Up (new provider); YAMILE BRIGGS ([place])           Yamile Briggs PA-C

## 2025-04-15 NOTE — PATIENT INSTRUCTIONS
Please come to office 1-2 weeks prior to your next appt to have your labs drawn in order for the results to be available to review.  You do not need an appt or need to fast. Our lab is open from 7am- 5pm. If you do not come to a [facility] for your lab draws we need to be notified to change to [facility]. Our lab is in the same office as provider .

## 2025-04-15 NOTE — PROGRESS NOTES
Patient here for new patient visit with Yamile Briggs for Dx of Anemia.  Patient here alone. She ambulated with walker. She was steady     Medications and Allergies reviewed and reconciled this visit.        Pt reports appetite is good.  She reports feelng fatigued  Dizziness: denies   Bleeding: denies   PICA: denies   Fevers/Chills/weight loss/night sweats: denies .  She has MRCP ordered as they found cysts on pancreas when they did the workup for her MI in February.       Follow up per  PA  request.    Pt. reports availability and use of mychart, Reviewed this is a good place to communicate with the team as well as review labs and upcoming orders.    No barriers to education noted, patient agrees to current plan and verbalized understanding using teach back method.

## 2025-04-18 ENCOUNTER — APPOINTMENT (OUTPATIENT)
Dept: HEMATOLOGY/ONCOLOGY | Facility: CLINIC | Age: 89
End: 2025-04-18
Payer: MEDICARE

## 2025-04-23 ENCOUNTER — HOSPITAL ENCOUNTER (OUTPATIENT)
Dept: RADIOLOGY | Facility: EXTERNAL LOCATION | Age: 89
Discharge: HOME | End: 2025-04-23

## 2025-04-25 ENCOUNTER — INFUSION (OUTPATIENT)
Dept: HEMATOLOGY/ONCOLOGY | Facility: CLINIC | Age: 89
End: 2025-04-25
Payer: MEDICARE

## 2025-04-25 VITALS
WEIGHT: 138.89 LBS | BODY MASS INDEX: 23.84 KG/M2 | RESPIRATION RATE: 19 BRPM | OXYGEN SATURATION: 99 % | HEART RATE: 63 BPM | SYSTOLIC BLOOD PRESSURE: 132 MMHG | DIASTOLIC BLOOD PRESSURE: 73 MMHG | TEMPERATURE: 97.3 F

## 2025-04-25 DIAGNOSIS — D64.9 ACUTE ANEMIA: ICD-10-CM

## 2025-04-25 PROCEDURE — 96365 THER/PROPH/DIAG IV INF INIT: CPT | Mod: INF

## 2025-04-25 PROCEDURE — 2500000004 HC RX 250 GENERAL PHARMACY W/ HCPCS (ALT 636 FOR OP/ED): Mod: JZ | Performed by: NURSE PRACTITIONER

## 2025-04-25 RX ORDER — DIPHENHYDRAMINE HYDROCHLORIDE 50 MG/ML
50 INJECTION, SOLUTION INTRAMUSCULAR; INTRAVENOUS AS NEEDED
OUTPATIENT
Start: 2025-05-02

## 2025-04-25 RX ORDER — EPINEPHRINE 0.3 MG/.3ML
0.3 INJECTION SUBCUTANEOUS EVERY 5 MIN PRN
OUTPATIENT
Start: 2025-05-02

## 2025-04-25 RX ORDER — FAMOTIDINE 10 MG/ML
20 INJECTION, SOLUTION INTRAVENOUS ONCE AS NEEDED
OUTPATIENT
Start: 2025-05-02

## 2025-04-25 RX ORDER — EPINEPHRINE 0.3 MG/.3ML
0.3 INJECTION SUBCUTANEOUS EVERY 5 MIN PRN
Status: DISCONTINUED | OUTPATIENT
Start: 2025-04-25 | End: 2025-04-25 | Stop reason: HOSPADM

## 2025-04-25 RX ORDER — ALBUTEROL SULFATE 0.83 MG/ML
3 SOLUTION RESPIRATORY (INHALATION) AS NEEDED
OUTPATIENT
Start: 2025-05-02

## 2025-04-25 RX ORDER — HEPARIN 100 UNIT/ML
500 SYRINGE INTRAVENOUS AS NEEDED
OUTPATIENT
Start: 2025-04-25

## 2025-04-25 RX ORDER — ALBUTEROL SULFATE 0.83 MG/ML
3 SOLUTION RESPIRATORY (INHALATION) AS NEEDED
Status: DISCONTINUED | OUTPATIENT
Start: 2025-04-25 | End: 2025-04-25 | Stop reason: HOSPADM

## 2025-04-25 RX ORDER — FAMOTIDINE 10 MG/ML
20 INJECTION, SOLUTION INTRAVENOUS ONCE AS NEEDED
Status: DISCONTINUED | OUTPATIENT
Start: 2025-04-25 | End: 2025-04-25 | Stop reason: HOSPADM

## 2025-04-25 RX ORDER — DIPHENHYDRAMINE HYDROCHLORIDE 50 MG/ML
50 INJECTION, SOLUTION INTRAMUSCULAR; INTRAVENOUS AS NEEDED
Status: DISCONTINUED | OUTPATIENT
Start: 2025-04-25 | End: 2025-04-25 | Stop reason: HOSPADM

## 2025-04-25 RX ORDER — HEPARIN SODIUM,PORCINE/PF 10 UNIT/ML
50 SYRINGE (ML) INTRAVENOUS AS NEEDED
OUTPATIENT
Start: 2025-04-25

## 2025-04-25 RX ADMIN — IRON SUCROSE 300 MG: 20 INJECTION, SOLUTION INTRAVENOUS at 09:02

## 2025-04-25 ASSESSMENT — PAIN SCALES - GENERAL
PAINLEVEL_OUTOF10: 0-NO PAIN
PAINLEVEL_OUTOF10: 0-NO PAIN

## 2025-04-25 NOTE — PROGRESS NOTES
Patient arrived ambulatory with walker for iron infusion. Pt denies new or worsening symptoms. Pt educated on iron and observation.   Pt tolerated iron infusion and observation without incident. VSS. Pt given AVS. Pt left infusion ambulatory with walker.

## 2025-04-26 ENCOUNTER — HOSPITAL ENCOUNTER (OUTPATIENT)
Dept: RADIOLOGY | Facility: HOSPITAL | Age: 89
Discharge: HOME | End: 2025-04-26
Payer: MEDICARE

## 2025-04-26 DIAGNOSIS — K86.2 PANCREATIC CYST (HHS-HCC): ICD-10-CM

## 2025-04-26 DIAGNOSIS — Q45.3 ABNORMALITY OF PANCREATIC DUCT: ICD-10-CM

## 2025-04-26 PROCEDURE — 74183 MRI ABD W/O CNTR FLWD CNTR: CPT | Performed by: RADIOLOGY

## 2025-04-26 PROCEDURE — A9575 INJ GADOTERATE MEGLUMI 0.1ML: HCPCS

## 2025-04-26 PROCEDURE — 74183 MRI ABD W/O CNTR FLWD CNTR: CPT

## 2025-04-26 PROCEDURE — 2550000001 HC RX 255 CONTRASTS

## 2025-04-26 PROCEDURE — 76376 3D RENDER W/INTRP POSTPROCES: CPT | Performed by: RADIOLOGY

## 2025-04-26 RX ORDER — GADOTERATE MEGLUMINE 376.9 MG/ML
0.2 INJECTION INTRAVENOUS
Status: COMPLETED | OUTPATIENT
Start: 2025-04-26 | End: 2025-04-26

## 2025-04-26 RX ADMIN — GADOTERATE MEGLUMINE 13 ML: 376.9 INJECTION INTRAVENOUS at 14:21

## 2025-05-01 ENCOUNTER — DOCUMENTATION (OUTPATIENT)
Dept: SURGICAL ONCOLOGY | Facility: CLINIC | Age: 89
End: 2025-05-01
Payer: MEDICARE

## 2025-05-01 DIAGNOSIS — D37.8 NEOPLASM OF UNCERTAIN BEHAVIOR OF TAIL OF PANCREAS: Primary | ICD-10-CM

## 2025-05-01 NOTE — PROGRESS NOTES
Chart update:    Her imaging was reviewed at our multidisciplinary pancreas radiology conference yesterday.     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 There is an abrupt caliber change in the MPD with upstream duct dilatation in the tail up to 7 mm with dilated side branches.     There is no obvious mass though there may be a stricture in that area or small mass not readily visible on MR.     The appearance of the pancreas has been stable for 6 months.     I called and discussed the findings with her. 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     She does wish to move forward with a work-up. We discussed the usual work-up, which would include an endoscopic ultrasound (EUS). She recently had cardiac stents placed and is on DAAPT. She is seeing her Cardiologist soon and will see when it is safe to hold the Plavix. In the meantime, will order a . If normal, will consider MRI/MRCP in 6 months (in lieu of EUS).     Lucille Fuentes PA-C  Surgical Oncology

## 2025-05-02 ENCOUNTER — INFUSION (OUTPATIENT)
Dept: HEMATOLOGY/ONCOLOGY | Facility: CLINIC | Age: 89
End: 2025-05-02
Payer: MEDICARE

## 2025-05-02 VITALS
OXYGEN SATURATION: 98 % | BODY MASS INDEX: 23.92 KG/M2 | HEART RATE: 67 BPM | WEIGHT: 139.33 LBS | DIASTOLIC BLOOD PRESSURE: 70 MMHG | RESPIRATION RATE: 17 BRPM | TEMPERATURE: 97.3 F | SYSTOLIC BLOOD PRESSURE: 135 MMHG

## 2025-05-02 DIAGNOSIS — D64.9 ACUTE ANEMIA: ICD-10-CM

## 2025-05-02 PROCEDURE — 2500000004 HC RX 250 GENERAL PHARMACY W/ HCPCS (ALT 636 FOR OP/ED): Mod: JZ | Performed by: NURSE PRACTITIONER

## 2025-05-02 PROCEDURE — 96365 THER/PROPH/DIAG IV INF INIT: CPT | Mod: INF

## 2025-05-02 RX ORDER — DIPHENHYDRAMINE HYDROCHLORIDE 50 MG/ML
50 INJECTION, SOLUTION INTRAMUSCULAR; INTRAVENOUS AS NEEDED
OUTPATIENT
Start: 2025-05-09

## 2025-05-02 RX ORDER — EPINEPHRINE 0.3 MG/.3ML
0.3 INJECTION SUBCUTANEOUS EVERY 5 MIN PRN
OUTPATIENT
Start: 2025-05-09

## 2025-05-02 RX ORDER — FAMOTIDINE 10 MG/ML
20 INJECTION, SOLUTION INTRAVENOUS ONCE AS NEEDED
OUTPATIENT
Start: 2025-05-09

## 2025-05-02 RX ORDER — ALBUTEROL SULFATE 0.83 MG/ML
3 SOLUTION RESPIRATORY (INHALATION) AS NEEDED
OUTPATIENT
Start: 2025-05-09

## 2025-05-02 RX ADMIN — IRON SUCROSE 300 MG: 20 INJECTION, SOLUTION INTRAVENOUS at 09:04

## 2025-05-02 ASSESSMENT — PAIN SCALES - GENERAL: PAINLEVEL_OUTOF10: 0-NO PAIN

## 2025-05-02 NOTE — PROGRESS NOTES
Pt tolerated IV iron well. Stayed for full 30min observation afterwards. Pt states they feel well with no questions or complaints upon discharge. Schedule reviewed.

## 2025-05-07 ENCOUNTER — TELEPHONE (OUTPATIENT)
Facility: CLINIC | Age: 89
End: 2025-05-07
Payer: MEDICARE

## 2025-05-09 ENCOUNTER — INFUSION (OUTPATIENT)
Dept: HEMATOLOGY/ONCOLOGY | Facility: CLINIC | Age: 89
End: 2025-05-09
Payer: MEDICARE

## 2025-05-09 VITALS
BODY MASS INDEX: 24.22 KG/M2 | WEIGHT: 141.09 LBS | OXYGEN SATURATION: 98 % | HEART RATE: 79 BPM | DIASTOLIC BLOOD PRESSURE: 66 MMHG | TEMPERATURE: 97.3 F | SYSTOLIC BLOOD PRESSURE: 127 MMHG | RESPIRATION RATE: 18 BRPM

## 2025-05-09 DIAGNOSIS — I21.3 STEMI (ST ELEVATION MYOCARDIAL INFARCTION) (MULTI): ICD-10-CM

## 2025-05-09 DIAGNOSIS — D64.9 ANEMIA, UNSPECIFIED TYPE: ICD-10-CM

## 2025-05-09 DIAGNOSIS — D64.9 ACUTE ANEMIA: ICD-10-CM

## 2025-05-09 PROCEDURE — 2500000004 HC RX 250 GENERAL PHARMACY W/ HCPCS (ALT 636 FOR OP/ED): Mod: JZ | Performed by: NURSE PRACTITIONER

## 2025-05-09 PROCEDURE — 96365 THER/PROPH/DIAG IV INF INIT: CPT | Mod: INF

## 2025-05-09 RX ORDER — EPINEPHRINE 0.3 MG/.3ML
0.3 INJECTION SUBCUTANEOUS EVERY 5 MIN PRN
Status: DISCONTINUED | OUTPATIENT
Start: 2025-05-09 | End: 2025-05-09 | Stop reason: HOSPADM

## 2025-05-09 RX ORDER — DIPHENHYDRAMINE HYDROCHLORIDE 50 MG/ML
50 INJECTION, SOLUTION INTRAMUSCULAR; INTRAVENOUS AS NEEDED
OUTPATIENT
Start: 2025-05-16

## 2025-05-09 RX ORDER — ALBUTEROL SULFATE 0.83 MG/ML
3 SOLUTION RESPIRATORY (INHALATION) AS NEEDED
OUTPATIENT
Start: 2025-05-16

## 2025-05-09 RX ORDER — CLOPIDOGREL BISULFATE 75 MG/1
75 TABLET ORAL DAILY
Qty: 90 TABLET | Refills: 3 | Status: SHIPPED | OUTPATIENT
Start: 2025-05-09 | End: 2025-05-09 | Stop reason: SDUPTHER

## 2025-05-09 RX ORDER — FAMOTIDINE 10 MG/ML
20 INJECTION, SOLUTION INTRAVENOUS ONCE AS NEEDED
OUTPATIENT
Start: 2025-05-16

## 2025-05-09 RX ORDER — ATORVASTATIN CALCIUM 40 MG/1
40 TABLET, FILM COATED ORAL NIGHTLY
Qty: 90 TABLET | Refills: 3 | Status: SHIPPED | OUTPATIENT
Start: 2025-05-09 | End: 2025-05-09 | Stop reason: SDUPTHER

## 2025-05-09 RX ORDER — ALBUTEROL SULFATE 0.83 MG/ML
3 SOLUTION RESPIRATORY (INHALATION) AS NEEDED
Status: DISCONTINUED | OUTPATIENT
Start: 2025-05-09 | End: 2025-05-09 | Stop reason: HOSPADM

## 2025-05-09 RX ORDER — METOPROLOL SUCCINATE 25 MG/1
25 TABLET, EXTENDED RELEASE ORAL DAILY
Qty: 90 TABLET | Refills: 3 | Status: SHIPPED | OUTPATIENT
Start: 2025-05-09 | End: 2025-05-09 | Stop reason: SDUPTHER

## 2025-05-09 RX ORDER — FAMOTIDINE 10 MG/ML
20 INJECTION, SOLUTION INTRAVENOUS ONCE AS NEEDED
Status: DISCONTINUED | OUTPATIENT
Start: 2025-05-09 | End: 2025-05-09 | Stop reason: HOSPADM

## 2025-05-09 RX ORDER — EPINEPHRINE 0.3 MG/.3ML
0.3 INJECTION SUBCUTANEOUS EVERY 5 MIN PRN
OUTPATIENT
Start: 2025-05-16

## 2025-05-09 RX ORDER — DIPHENHYDRAMINE HYDROCHLORIDE 50 MG/ML
50 INJECTION, SOLUTION INTRAMUSCULAR; INTRAVENOUS AS NEEDED
Status: DISCONTINUED | OUTPATIENT
Start: 2025-05-09 | End: 2025-05-09 | Stop reason: HOSPADM

## 2025-05-09 RX ADMIN — IRON SUCROSE 300 MG: 20 INJECTION, SOLUTION INTRAVENOUS at 12:33

## 2025-05-09 ASSESSMENT — PAIN SCALES - GENERAL: PAINLEVEL_OUTOF10: 0-NO PAIN

## 2025-05-11 RX ORDER — METOPROLOL SUCCINATE 25 MG/1
25 TABLET, EXTENDED RELEASE ORAL DAILY
Qty: 90 TABLET | Refills: 3 | Status: SHIPPED | OUTPATIENT
Start: 2025-05-11

## 2025-05-11 RX ORDER — ATORVASTATIN CALCIUM 40 MG/1
40 TABLET, FILM COATED ORAL NIGHTLY
Qty: 90 TABLET | Refills: 3 | Status: SHIPPED | OUTPATIENT
Start: 2025-05-11

## 2025-05-11 RX ORDER — CLOPIDOGREL BISULFATE 75 MG/1
75 TABLET ORAL DAILY
Qty: 90 TABLET | Refills: 3 | Status: SHIPPED | OUTPATIENT
Start: 2025-05-11

## 2025-05-14 ENCOUNTER — TELEPHONE (OUTPATIENT)
Dept: HEMATOLOGY/ONCOLOGY | Facility: CLINIC | Age: 89
End: 2025-05-14
Payer: MEDICARE

## 2025-05-14 NOTE — TELEPHONE ENCOUNTER
Reason for Conversation  Medication Question    Background   Patient calling. She finished her iron infusions and is asking if she should resume taking her iron pills? Pt can be reached at [phone]    Disposition   No disposition on file.

## 2025-06-03 ENCOUNTER — TELEPHONE (OUTPATIENT)
Dept: CARDIOLOGY | Facility: CLINIC | Age: 89
End: 2025-06-03
Payer: MEDICARE

## 2025-06-23 NOTE — PROGRESS NOTES
[facility]  Hand and Upper Extremity Service  Follow up visit         Follow up for: Right wrist    Interval History: She returns for her right wrist and forearm pain. 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 She has had multiple procedures to her bilateral hands, including remote carpal tunnel releases by Dr. Musa many years ago and then last summer she underwent a trigger finger release to her left middle and ring fingers. Her primary concern today is of pain along the volar aspect of the forearm that occurs mostly at night while she is watching television and letting her right arm rest against her abdomen. She also has numbness in her left hand, most noticeable in the morning when she is reading the newspaper. She has a complex medical history including a heart attack in February of this year that was treated with stenting and she is currently on Plavix. She has also been identified as having a gastrointestinal bleed and needs a colonoscopy but this has not been scheduled yet, likely related to her anti-coagulation medication. She ambulates with a walker.               Past medical history, medications, allergies, surgical history and review of systems are reviewed and otherwise unchanged when compared to last visit on 6/24/24         Examination:  Constitutional: Oriented to person, place, and time.  Appears well-developed and well-nourished.  Head: Normocephalic and atraumatic.  Eyes: Pupils are equal, round, and reactive to light.  Cardiovascular: Intact distal pulses.  Pulmonary/Chest/Breast: Effort normal. No respiratory distress.  Neurological: Alert and oriented to person, place, and time.  Skin: Skin is warm and dry.  Psychiatric: normal mood and affect.  Behavior is normal.  Musculoskeletal: Right wrist reveals marked swelling and bony prominence over the radiocarpal joint. Tenderness to palpation and pain with any attempted  movement of the wrist. No real focal tenderness along the volar forearm. Healed surgical incision from carpal tunnel release. Chronic atrophy of the abductor pollicis brevis. Full finger flexion without triggering. Normal subjective sensation and negative provocative maneuvers for carpal tunnel syndrome. Left hand reveals arthritic changes throughout the wrist and hand. Healed surgical incisions in the palm from prior carpal and trigger finger releases. abductor pollicis brevis atrophy. Full finger flexion without triggering. Negative provocative maneuvers for carpal tunnel syndrome.       Personal Interpretation of Diagnostic studies: X-rays of the right hand obtained today demonstrate advanced severe arthritic changes throughout the wrist joint, much progressed from the most recent x-ray of the right wrist from 2015.        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 I have offered to give her a steroid injection into her wrist joint today. I am not sure what to make of the numbness in the left hand and it is possible her carpal tunnel syndrome has recurred but her provocative maneuvers for carpal tunnel syndrome are negative today. It is also possible she has cervical radiculopathy or severe arthritis throughout her body. We discussed potential EMG testing but she indicates she is not considering any further surgery or treatments for the numbness if pathology was identified with EMG testing so we will hold off on that at this time. We gave her a right wrist joint injection and she will follow up as needed for recurrence of symptoms.       In Office Procedures Performed: Right wrist joint injection   M Inj/Asp: R radiocarpal on 6/30/2025 10:40 AM  Indications: pain and joint swelling  Details: 25 G needle  Medications: 40 mg triamcinolone acetonide 40 mg/mL; 0.5 mL lidocaine 10  mg/mL (1 %)  Outcome: tolerated well, no immediate complications  Procedure, treatment alternatives, risks and benefits explained, specific risks discussed. Consent was given by the patient. Immediately prior to procedure a time out was called to verify the correct patient, procedure, equipment, support staff and site/side marked as required. Patient was prepped and draped in the usual sterile fashion.       Follow up: As needed              Alejandro Gunderson MD  [facility]  Department of Orthopaedic Surgery  Hand and Upper Extremity Reconstruction    Scribe Attestation  By signing my name below, I, Domenica Burton , Scribe   attest that this documentation has been prepared under the direction and in the presence of Dr. Alejandro Gunderson.    Dictation performed with the use of voice recognition software.  Syntax and grammatical errors may exist.

## 2025-06-27 DIAGNOSIS — M79.641 HAND PAIN, RIGHT: Primary | ICD-10-CM

## 2025-06-30 ENCOUNTER — APPOINTMENT (OUTPATIENT)
Dept: ORTHOPEDIC SURGERY | Facility: CLINIC | Age: 89
End: 2025-06-30
Payer: MEDICARE

## 2025-06-30 ENCOUNTER — HOSPITAL ENCOUNTER (OUTPATIENT)
Dept: RADIOLOGY | Facility: CLINIC | Age: 89
Discharge: HOME | End: 2025-06-30
Payer: MEDICARE

## 2025-06-30 VITALS — HEIGHT: 64 IN | WEIGHT: 141 LBS | BODY MASS INDEX: 24.07 KG/M2

## 2025-06-30 DIAGNOSIS — M19.031 ARTHRITIS OF RIGHT WRIST: Primary | ICD-10-CM

## 2025-06-30 DIAGNOSIS — M79.641 HAND PAIN, RIGHT: ICD-10-CM

## 2025-06-30 PROCEDURE — 1036F TOBACCO NON-USER: CPT | Performed by: ORTHOPAEDIC SURGERY

## 2025-06-30 PROCEDURE — 73130 X-RAY EXAM OF HAND: CPT | Mod: RT

## 2025-06-30 PROCEDURE — 1159F MED LIST DOCD IN RCRD: CPT | Performed by: ORTHOPAEDIC SURGERY

## 2025-06-30 PROCEDURE — 73130 X-RAY EXAM OF HAND: CPT | Mod: RIGHT SIDE | Performed by: STUDENT IN AN ORGANIZED HEALTH CARE EDUCATION/TRAINING PROGRAM

## 2025-06-30 PROCEDURE — 99213 OFFICE O/P EST LOW 20 MIN: CPT | Performed by: ORTHOPAEDIC SURGERY

## 2025-06-30 PROCEDURE — 20605 DRAIN/INJ JOINT/BURSA W/O US: CPT | Performed by: ORTHOPAEDIC SURGERY

## 2025-06-30 RX ORDER — LIDOCAINE HYDROCHLORIDE 10 MG/ML
0.5 INJECTION, SOLUTION INFILTRATION; PERINEURAL
Status: COMPLETED | OUTPATIENT
Start: 2025-06-30 | End: 2025-06-30

## 2025-06-30 RX ORDER — TRIAMCINOLONE ACETONIDE 40 MG/ML
40 INJECTION, SUSPENSION INTRA-ARTICULAR; INTRAMUSCULAR
Status: COMPLETED | OUTPATIENT
Start: 2025-06-30 | End: 2025-06-30

## 2025-06-30 RX ADMIN — LIDOCAINE HYDROCHLORIDE 0.5 ML: 10 INJECTION, SOLUTION INFILTRATION; PERINEURAL at 10:40

## 2025-06-30 RX ADMIN — TRIAMCINOLONE ACETONIDE 40 MG: 40 INJECTION, SUSPENSION INTRA-ARTICULAR; INTRAMUSCULAR at 10:40

## 2025-06-30 ASSESSMENT — PAIN SCALES - GENERAL: PAINLEVEL_OUTOF10: 5 - MODERATE PAIN

## 2025-06-30 ASSESSMENT — PAIN DESCRIPTION - DESCRIPTORS: DESCRIPTORS: ACHING;SORE

## 2025-06-30 ASSESSMENT — PAIN - FUNCTIONAL ASSESSMENT: PAIN_FUNCTIONAL_ASSESSMENT: 0-10

## 2025-07-01 ENCOUNTER — APPOINTMENT (OUTPATIENT)
Dept: LAB | Facility: HOSPITAL | Age: 89
End: 2025-07-01
Payer: MEDICARE

## 2025-07-01 ENCOUNTER — LAB (OUTPATIENT)
Dept: LAB | Facility: HOSPITAL | Age: 89
End: 2025-07-01
Payer: MEDICARE

## 2025-07-01 DIAGNOSIS — D64.9 ANEMIA, UNSPECIFIED: Primary | ICD-10-CM

## 2025-07-01 DIAGNOSIS — D37.8 NEOPLASM OF UNCERTAIN BEHAVIOR OF OTHER SPECIFIED DIGESTIVE ORGANS: ICD-10-CM

## 2025-07-01 LAB
ALBUMIN SERPL BCP-MCNC: 3.9 G/DL (ref 3.4–5)
ALP SERPL-CCNC: 130 U/L (ref 33–136)
ALT SERPL W P-5'-P-CCNC: 24 U/L (ref 7–45)
ANION GAP SERPL CALCULATED.3IONS-SCNC: 12 MMOL/L (ref 10–20)
AST SERPL W P-5'-P-CCNC: 37 U/L (ref 9–39)
BASOPHILS # BLD AUTO: 0.02 X10*3/UL (ref 0–0.1)
BASOPHILS NFR BLD AUTO: 0.3 %
BILIRUB SERPL-MCNC: 0.7 MG/DL (ref 0–1.2)
BUN SERPL-MCNC: 31 MG/DL (ref 6–23)
CALCIUM SERPL-MCNC: 8.9 MG/DL (ref 8.6–10.3)
CANCER AG19-9 SERPL-ACNC: 10 U/ML
CHLORIDE SERPL-SCNC: 99 MMOL/L (ref 98–107)
CO2 SERPL-SCNC: 21 MMOL/L (ref 21–32)
CREAT SERPL-MCNC: 0.83 MG/DL (ref 0.5–1.05)
EGFRCR SERPLBLD CKD-EPI 2021: 67 ML/MIN/1.73M*2
EOSINOPHIL # BLD AUTO: 0 X10*3/UL (ref 0–0.4)
EOSINOPHIL NFR BLD AUTO: 0 %
ERYTHROCYTE [DISTWIDTH] IN BLOOD BY AUTOMATED COUNT: 17 % (ref 11.5–14.5)
FERRITIN SERPL-MCNC: 606 NG/ML (ref 8–150)
GLUCOSE SERPL-MCNC: 125 MG/DL (ref 74–99)
HCT VFR BLD AUTO: 30.3 % (ref 36–46)
HGB BLD-MCNC: 10.2 G/DL (ref 12–16)
IMM GRANULOCYTES # BLD AUTO: 0.06 X10*3/UL (ref 0–0.5)
IMM GRANULOCYTES NFR BLD AUTO: 0.8 % (ref 0–0.9)
IRON SATN MFR SERPL: 30 % (ref 25–45)
IRON SERPL-MCNC: 76 UG/DL (ref 35–150)
LYMPHOCYTES # BLD AUTO: 0.68 X10*3/UL (ref 0.8–3)
LYMPHOCYTES NFR BLD AUTO: 8.6 %
MCH RBC QN AUTO: 32.5 PG (ref 26–34)
MCHC RBC AUTO-ENTMCNC: 33.7 G/DL (ref 32–36)
MCV RBC AUTO: 97 FL (ref 80–100)
MONOCYTES # BLD AUTO: 0.51 X10*3/UL (ref 0.05–0.8)
MONOCYTES NFR BLD AUTO: 6.5 %
NEUTROPHILS # BLD AUTO: 6.61 X10*3/UL (ref 1.6–5.5)
NEUTROPHILS NFR BLD AUTO: 83.8 %
NRBC BLD-RTO: 0 /100 WBCS (ref 0–0)
PLATELET # BLD AUTO: 223 X10*3/UL (ref 150–450)
POTASSIUM SERPL-SCNC: 4.5 MMOL/L (ref 3.5–5.3)
PROT SERPL-MCNC: 6 G/DL (ref 6.4–8.2)
RBC # BLD AUTO: 3.14 X10*6/UL (ref 4–5.2)
SODIUM SERPL-SCNC: 127 MMOL/L (ref 136–145)
TIBC SERPL-MCNC: 251 UG/DL (ref 240–445)
UIBC SERPL-MCNC: 175 UG/DL (ref 110–370)
VIT B12 SERPL-MCNC: 506 PG/ML (ref 211–911)
WBC # BLD AUTO: 7.9 X10*3/UL (ref 4.4–11.3)

## 2025-07-01 PROCEDURE — 83550 IRON BINDING TEST: CPT

## 2025-07-01 PROCEDURE — 83540 ASSAY OF IRON: CPT

## 2025-07-01 PROCEDURE — 86301 IMMUNOASSAY TUMOR CA 19-9: CPT

## 2025-07-01 PROCEDURE — 82728 ASSAY OF FERRITIN: CPT

## 2025-07-01 PROCEDURE — 80053 COMPREHEN METABOLIC PANEL: CPT

## 2025-07-01 PROCEDURE — 85025 COMPLETE CBC W/AUTO DIFF WBC: CPT

## 2025-07-01 PROCEDURE — 82607 VITAMIN B-12: CPT

## 2025-07-01 PROCEDURE — 36415 COLL VENOUS BLD VENIPUNCTURE: CPT

## 2025-07-16 ENCOUNTER — OFFICE VISIT (OUTPATIENT)
Dept: HEMATOLOGY/ONCOLOGY | Facility: CLINIC | Age: 89
End: 2025-07-16
Payer: MEDICARE

## 2025-07-16 VITALS
HEART RATE: 71 BPM | RESPIRATION RATE: 18 BRPM | BODY MASS INDEX: 24.14 KG/M2 | OXYGEN SATURATION: 99 % | DIASTOLIC BLOOD PRESSURE: 71 MMHG | WEIGHT: 140.65 LBS | SYSTOLIC BLOOD PRESSURE: 146 MMHG | TEMPERATURE: 97.2 F

## 2025-07-16 DIAGNOSIS — E53.8 B12 DEFICIENCY: ICD-10-CM

## 2025-07-16 DIAGNOSIS — D64.9 SYMPTOMATIC ANEMIA: ICD-10-CM

## 2025-07-16 PROCEDURE — 3077F SYST BP >= 140 MM HG: CPT | Performed by: NURSE PRACTITIONER

## 2025-07-16 PROCEDURE — 1125F AMNT PAIN NOTED PAIN PRSNT: CPT | Performed by: NURSE PRACTITIONER

## 2025-07-16 PROCEDURE — 99215 OFFICE O/P EST HI 40 MIN: CPT | Performed by: NURSE PRACTITIONER

## 2025-07-16 PROCEDURE — 3078F DIAST BP <80 MM HG: CPT | Performed by: NURSE PRACTITIONER

## 2025-07-16 RX ORDER — LANOLIN ALCOHOL/MO/W.PET/CERES
1000 CREAM (GRAM) TOPICAL DAILY
Qty: 90 TABLET | Refills: 3 | Status: SHIPPED | OUTPATIENT
Start: 2025-07-16

## 2025-07-16 ASSESSMENT — ENCOUNTER SYMPTOMS
FATIGUE: 0
HEMATOLOGIC/LYMPHATIC NEGATIVE: 1
PALPITATIONS: 0
ABDOMINAL PAIN: 0
EYES NEGATIVE: 1
NUMBNESS: 1
SORE THROAT: 0
CARDIOVASCULAR NEGATIVE: 1
BRUISES/BLEEDS EASILY: 0
CHEST TIGHTNESS: 0
TROUBLE SWALLOWING: 0
LIGHT-HEADEDNESS: 0
EXTREMITY WEAKNESS: 1
CONSTIPATION: 1
DIARRHEA: 0
DIZZINESS: 0
RESPIRATORY NEGATIVE: 1
ADENOPATHY: 0
SHORTNESS OF BREATH: 0
HEMOPTYSIS: 0
LEG SWELLING: 0
BLOOD IN STOOL: 0
COUGH: 0
HEADACHES: 0
ABDOMINAL DISTENTION: 0

## 2025-07-16 ASSESSMENT — PAIN SCALES - GENERAL: PAINLEVEL_OUTOF10: 2

## 2025-07-16 NOTE — PROGRESS NOTES
Patient ID: Michell Medrano is a 89 y.o. female.  Referring Physician: Yamile Briggs PA-C  [address] [place]  [place] [address]  Primary Care Provider: Zack Nieves MD  Visit Type: Follow Up      Subjective    HPI  89-year-old  female initally presented for evaluation of symptomatic anemia.   Per medical record review anemia dates back to February 2018 with hemoglobin 11.8 increased RDW.  History of iron infusions and blood transfusions through the years.   She is currently taking oral iron with tolerable constipation and continues on methotrexate weekly.   She received IV iron Oct 2024 and May 2025 with current labs 7/1/25 WBC 7.9, hgb 10.2 plt 223 with ferritan 606, iron 76, % sat 30 and B12 of 506.  SPEP normal 2/12/25 with elevated sed rate 52.  She was found to have changes on flow cytometry 2/12/25 --Small clonal CD5-, CD10-, kappa+ B cell population (0.1%) detected, see note.   --No other specific immunophenotypic or morphologic abnormality was identified, see note.    Note: Morphology was previously reviewed. Macrocytosis is noted but the patient is on methotrexate based on chart review. 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 The low level is not amenable to assessment by NGS although myeloid NGS is pending (ordered by clinician) and includes MYD88. Low level blood involvement by a systemic lymphoma cannot be excluded. If cell count abnormalities persist and remain unexplained, a bone marrow aspirate with additional associated genetic studies may be helpful in the evaluation of the process. Clinical correlation recommended.      PMH of choledocholithiasis and chronic cholecystitis s/p recent laparoscopic cholecystectomy 10/11/24 c/b atrial flutter, RA, pancreatic cyst, HTN, chronic diastolic HF, KAROLINA, PFO per TTE 10/2024, hypothyroidism, GERD, chronic venous  "insufficiency, remote breast CA, venous insufficiency, gout and rheumatoid arthritis.  Notable decrease in kidney function.      GI scopes planned in April 2025. Positive blood occult found in 11/2024 during hospitalization.  Delay due to MI      Hospitalized at [facility] 10/6/2024 Received IV iron. Cholecystitis.  Cholecystectomy performed.     Hospitalized [facility] 11/21/2024 Possible allergic RXN to flecainide, Eliquis, or xarelto. Received Epi. Anemia, Occult blood positive. Outpatient GI workup recommended. This was delayed due to MI and maintained on Plavix.   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  She will follow cardiology for anticoagulation management.     Hospitalized 2/17/2025 for ST elevation myocardial infarction (STEMI) and Stent placed. Hypotension. Follows cardiology.  Now on Plavix and ASA baby 81 mg.      ERCP 10/10/2024   Impression:     1. Choledocholithiasis s/p biliary sphincterotomy                            and stone extraction as detailed above.                            2. Filling of the cystic duct.                            3. Normal ventral PD at the level of the head.      HISTORY OF PRESENT ILLNESS      ID Statement: Michell Medrano is an 89 year old female      Chief Complaint: \"feeling pretty good\"     Interval History:   Michell presents alone for follow up.  She lives in [place].  She reports she is feeling well with good energy level. She is taking oral iron and B12.  Her urologist does yearly breast exam for her.   She comments fatigue before, feel better than before.  She feels overall improved.  She reports that in 2000 she had breast cancer and is a breast cancer survivor, and did have chemo and radiation. Left breast Lumpectomy with lymph node involvement. Hormone ER positive.  Took anastrozole for about 3 years.     She reports reports chronic anemia intermittent with blood and iron transfusions after all surgeries her entire life. Taking oral iron " daily for many years. Some constipation, however manageable. UTI recently, no infection in over 5 years. On Estradiol more regularly now which helps prevent UTI, localized cream only  Maintaining. Very good appetite.   Lightheadedness ongoing for several years, occasionally still occurs, but much less.  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 Use a walker. Vascular insufficiency.  Feels a little unsteady.  She denies bone pain at this time.   She denies any bleeding or bruising.  Denies F/C/recent illness/infection, drenching night sweats, unintentional weight loss, anorexia/loss of appetite, HA, dizziness, PICA, acute changes in vision/hearing, palpitations, CP, SOB, n/v/d/abd pain, gopi bleeding, melena, urinary issues now, haematuria, LAD, bruising, sores, itchy or rashes.  Current labs WBC 5.9, hgb 8.8, plt 234 with ferritan 61, iron 31 and % sat 11.  We have arranged for IV iron to begin next week.   She continues on methotrexate for RA.  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   They are planning on getting MRCP to further evaluate pancreatic cysts.   Mammogram 2/11/25 normal.  CT of head 3/18/25 normal with exception to sinus issues. B12 was good 2/12/25.       Review of Systems   Constitutional:  Negative for fatigue (minimal, she stays active at the home).   HENT:  Negative.  Negative for nosebleeds, sore throat, tinnitus and trouble swallowing.    Eyes: Negative.    Respiratory: Negative.  Negative for chest tightness, cough, hemoptysis and shortness of breath.    Cardiovascular: Negative.  Negative for chest pain, leg swelling and palpitations.   Gastrointestinal:  Positive for constipation (manageable on oral iron]). Negative for abdominal distention, abdominal pain, blood in stool and diarrhea.   Musculoskeletal:  Positive for gait problem.   Skin: Negative.    Neurological:  Positive for extremity weakness, gait problem and numbness. Negative for dizziness, headaches  and light-headedness.   Hematological: Negative.  Negative for adenopathy. Does not bruise/bleed easily.        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   has a past surgical history that includes Total hip arthroplasty (Bilateral, 01/15/2014); Other surgical history (12/05/2022); Other surgical history (Bilateral, 12/05/2022); Total abdominal hysterectomy w/ bilateral salpingoophorectomy (12/10/2014); Breast lumpectomy (2000); Cardiac catheterization (N/A, 2/14/2025); and Cardiac catheterization (N/A, 2/14/2025).  Family History[1]      Michell Medrano  reports that she has never smoked. She has never used smokeless tobacco.  She  reports that she does not currently use alcohol.  She  reports no history of drug use.    Physical Exam  Constitutional:       Appearance: Normal appearance.      Comments: She appears to be feeling quite well today     Eyes:      Conjunctiva/sclera: Conjunctivae normal.       Cardiovascular:      Rate and Rhythm: Normal rate and regular rhythm.      Pulses: Normal pulses.      Heart sounds: Normal heart sounds. No murmur heard.  Pulmonary:      Effort: Pulmonary effort is normal. No respiratory distress.      Breath sounds: Normal breath sounds. No stridor. No wheezing.   Abdominal:      General: There is no distension.      Palpations: There is no mass.      Tenderness: There is no abdominal tenderness.     Musculoskeletal:         General: No swelling or tenderness. Normal range of motion.    Lymphadenopathy:      Cervical: No cervical adenopathy.     Skin:     Findings: Bruising present.      Comments: Bruises dorsum RUE, from leaning on shopping cart     Neurological:      Motor: No weakness.       WBC   Date/Time Value Ref Range Status   07/01/2025 11:45 AM 7.9 4.4 - 11.3 x10*3/uL Final   03/18/2025 09:53 AM 4.3 (L) 4.4 - 11.3 x10*3/uL Final   02/17/2025 05:22 AM 3.9 (L) 4.4 - 11.3 x10*3/uL Final     WHITE BLOOD CELL COUNT   Date/Time Value Ref Range Status   03/26/2025 03:44 PM 5.9 3.8 - 10.8 Thousand/uL Final     nRBC   Date Value Ref Range Status   07/01/2025 0.0 0.0 - 0.0 /100 WBCs Final   03/18/2025 0.0 0.0 - 0.0 /100 WBCs Final   02/17/2025 0.5 (H) 0.0 - 0.0 /100 WBCs Final     RBC   Date Value Ref Range Status   07/01/2025 3.14 (L) 4.00 - 5.20 x10*6/uL Final   03/18/2025 2.69 (L) 4.00 - 5.20 x10*6/uL Final   02/17/2025 2.74 (L) 4.00 - 5.20 x10*6/uL Final     RED BLOOD CELL COUNT   Date Value Ref Range Status   03/26/2025 2.62 (L) 3.80 - 5.10 Million/uL Final     Hemoglobin   Date Value Ref Range Status   07/01/2025 10.2 (L) 12.0 - 16.0 g/dL Final   03/18/2025 8.9 (L) 12.0 - 16.0 g/dL Final   02/17/2025 9.0 (L) 12.0 - 16.0 g/dL Final     HEMOGLOBIN   Date Value Ref Range Status   03/26/2025 8.8 (L) 11.7 - 15.5 g/dL Final     Hematocrit   Date Value Ref Range Status   07/01/2025 30.3 (L) 36.0 - 46.0 % Final   03/18/2025 27.3 (L) 36.0 - 46.0 % Final   02/17/2025 27.5 (L) 36.0 - 46.0 % Final     HEMATOCRIT   Date Value Ref Range Status   03/26/2025 26.6 (L) 35.0 - 45.0 % Final     MCV   Date/Time Value Ref Range Status   07/01/2025 11:45 AM 97 80 - 100 fL Final   03/26/2025 03:44 .5 (H) 80.0 - 100.0 fL Final   03/18/2025 09:53  (H) 80 - 100 fL Final   02/17/2025 05:22  80 - 100 fL Final     MCH   Date/Time Value Ref Range Status   07/01/2025 11:45 AM 32.5 26.0 - 34.0 pg Final   03/26/2025 03:44 PM 33.6 (H) 27.0 - 33.0 pg Final   03/18/2025 09:53 AM 33.1 26.0 - 34.0 pg Final    02/17/2025 05:22 AM 32.8 26.0 - 34.0 pg Final     MCHC   Date/Time Value Ref Range Status   07/01/2025 11:45 AM 33.7 32.0 - 36.0 g/dL Final   03/26/2025 03:44 PM 33.1 32.0 - 36.0 g/dL Final     Comment:     For adults, a slight decrease in the calculated MCHC  value (in the range of 30 to 32 g/dL) is most likely  not clinically significant; however, it should be  interpreted with caution in correlation with other  red cell parameters and the patient's clinical  condition.     03/18/2025 09:53 AM 32.6 32.0 - 36.0 g/dL Final   02/17/2025 05:22 AM 32.7 32.0 - 36.0 g/dL Final     RDW   Date/Time Value Ref Range Status   07/01/2025 11:45 AM 17.0 (H) 11.5 - 14.5 % Final   03/26/2025 03:44 PM 15.8 (H) 11.0 - 15.0 % Final   03/18/2025 09:53 AM 17.4 (H) 11.5 - 14.5 % Final   02/17/2025 05:22 AM 17.7 (H) 11.5 - 14.5 % Final     Platelets   Date/Time Value Ref Range Status   07/01/2025 11:45  150 - 450 x10*3/uL Final   03/18/2025 09:53  150 - 450 x10*3/uL Final   02/17/2025 05:22  150 - 450 x10*3/uL Final     PLATELET COUNT   Date/Time Value Ref Range Status   03/26/2025 03:44  140 - 400 Thousand/uL Final     MPV   Date/Time Value Ref Range Status   03/26/2025 03:44 PM 9.2 7.5 - 12.5 fL Final     Neutrophils %   Date/Time Value Ref Range Status   07/01/2025 11:45 AM 83.8 40.0 - 80.0 % Final   03/18/2025 09:53 AM 66.0 40.0 - 80.0 % Final   02/17/2025 05:22 AM 53.3 40.0 - 80.0 % Final     Immature Granulocytes %, Automated   Date/Time Value Ref Range Status   07/01/2025 11:45 AM 0.8 0.0 - 0.9 % Final     Comment:     Immature Granulocyte Count (IG) includes promyelocytes, myelocytes and metamyelocytes but does not include bands. Percent differential counts (%) should be interpreted in the context of the absolute cell counts (cells/UL).   03/18/2025 09:53 AM 0.9 0.0 - 0.9 % Final     Comment:     Immature Granulocyte Count (IG) includes promyelocytes, myelocytes and metamyelocytes but does not include  bands. Percent differential counts (%) should be interpreted in the context of the absolute cell counts (cells/UL).   02/17/2025 05:22 AM 0.8 0.0 - 0.9 % Final     Comment:     Immature Granulocyte Count (IG) includes promyelocytes, myelocytes and metamyelocytes but does not include bands. Percent differential counts (%) should be interpreted in the context of the absolute cell counts (cells/UL).     Lymphocytes %   Date/Time Value Ref Range Status   07/01/2025 11:45 AM 8.6 13.0 - 44.0 % Final   03/18/2025 09:53 AM 20.6 13.0 - 44.0 % Final   02/17/2025 05:22 AM 32.3 13.0 - 44.0 % Final     LYMPHOCYTES   Date/Time Value Ref Range Status   03/26/2025 03:44 PM 20.3 % Final     Monocytes %   Date/Time Value Ref Range Status   07/01/2025 11:45 AM 6.5 2.0 - 10.0 % Final   03/18/2025 09:53 AM 8.8 2.0 - 10.0 % Final   02/17/2025 05:22 AM 9.8 2.0 - 10.0 % Final     MONOCYTES   Date/Time Value Ref Range Status   03/26/2025 03:44 PM 10.5 % Final     Eosinophils %   Date/Time Value Ref Range Status   07/01/2025 11:45 AM 0.0 0.0 - 6.0 % Final   03/18/2025 09:53 AM 3.2 0.0 - 6.0 % Final   02/17/2025 05:22 AM 2.8 0.0 - 6.0 % Final     EOSINOPHILS   Date/Time Value Ref Range Status   03/26/2025 03:44 PM 3.1 % Final     Basophils %   Date/Time Value Ref Range Status   07/01/2025 11:45 AM 0.3 0.0 - 2.0 % Final   03/18/2025 09:53 AM 0.5 0.0 - 2.0 % Final   02/17/2025 05:22 AM 1.0 0.0 - 2.0 % Final     BASOPHILS   Date/Time Value Ref Range Status   03/26/2025 03:44 PM 0.7 % Final     Neutrophils Absolute   Date/Time Value Ref Range Status   07/01/2025 11:45 AM 6.61 (H) 1.60 - 5.50 x10*3/uL Final     Comment:     Percent differential counts (%) should be interpreted in the context of the absolute cell counts (cells/uL).   03/18/2025 09:53 AM 2.86 1.60 - 5.50 x10*3/uL Final     Comment:     Percent differential counts (%) should be interpreted in the context of the absolute cell counts (cells/uL).   02/17/2025 05:22 AM 2.06 1.60 - 5.50  "x10*3/uL Final     Comment:     Percent differential counts (%) should be interpreted in the context of the absolute cell counts (cells/uL).     Immature Granulocytes Absolute, Automated   Date/Time Value Ref Range Status   07/01/2025 11:45 AM 0.06 0.00 - 0.50 x10*3/uL Final   03/18/2025 09:53 AM 0.04 0.00 - 0.50 x10*3/uL Final   02/17/2025 05:22 AM 0.03 0.00 - 0.50 x10*3/uL Final     Lymphocytes Absolute   Date/Time Value Ref Range Status   07/01/2025 11:45 AM 0.68 (L) 0.80 - 3.00 x10*3/uL Final   03/18/2025 09:53 AM 0.89 0.80 - 3.00 x10*3/uL Final   02/17/2025 05:22 AM 1.25 0.80 - 3.00 x10*3/uL Final     Monocytes Absolute   Date/Time Value Ref Range Status   07/01/2025 11:45 AM 0.51 0.05 - 0.80 x10*3/uL Final   03/18/2025 09:53 AM 0.38 0.05 - 0.80 x10*3/uL Final   02/17/2025 05:22 AM 0.38 0.05 - 0.80 x10*3/uL Final     Eosinophils Absolute   Date/Time Value Ref Range Status   07/01/2025 11:45 AM 0.00 0.00 - 0.40 x10*3/uL Final   03/18/2025 09:53 AM 0.14 0.00 - 0.40 x10*3/uL Final   02/17/2025 05:22 AM 0.11 0.00 - 0.40 x10*3/uL Final     ABSOLUTE EOSINOPHILS   Date/Time Value Ref Range Status   03/26/2025 03:44  15 - 500 cells/uL Final     Basophils Absolute   Date/Time Value Ref Range Status   07/01/2025 11:45 AM 0.02 0.00 - 0.10 x10*3/uL Final   03/18/2025 09:53 AM 0.02 0.00 - 0.10 x10*3/uL Final   02/17/2025 05:22 AM 0.04 0.00 - 0.10 x10*3/uL Final     ABSOLUTE BASOPHILS   Date/Time Value Ref Range Status   03/26/2025 03:44 PM 41 0 - 200 cells/uL Final       No components found for: \"PT\"  aPTT   Date/Time Value Ref Range Status   02/12/2025 02:26 PM 30 27 - 38 seconds Final   03/14/2019 01:06 PM 30 28 - 38 sec Final     Comment:       THE APTT IS NO LONGER USED FOR MONITORING     UNFRACTIONATED HEPARIN THERAPY.    FOR MONITORING HEPARIN THERAPY,     USE THE HEPARIN ASSAY.     11/23/2018 05:48 AM 27 (L) 28 - 38 sec Final     Comment:      Note new reference range as of 10/23/2018.    THE APTT IS NO LONGER " USED FOR MONITORING     UNFRACTIONATED HEPARIN THERAPY.    FOR MONITORING HEPARIN THERAPY,     USE THE HEPARIN ASSAY.     11/22/2018 08:33 AM 28 28 - 38 sec Final     Comment:      Note new reference range as of 10/23/2018.    THE APTT IS NO LONGER USED FOR MONITORING     UNFRACTIONATED HEPARIN THERAPY.    FOR MONITORING HEPARIN THERAPY,     USE THE HEPARIN ASSAY.       Flow Cytometry Test: [id number]  Order: [id number] - Part of Panel Order [id number]   Collected 2/12/2025 14:26       Status: Final result       Dx: Cytopenia    Test Result Released: Yes (seen)    0 Result Notes      Component  Ref Range & Units    Diagnosis   --Small clonal CD5-, CD10-, kappa+ B cell population (0.1%) detected, see note.   --No other specific immunophenotypic or morphologic abnormality was identified, see note.      Note: Morphology was previously reviewed. Macrocytosis is noted but the patient is on methotrexate based on chart review. 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 The low level is not amenable to assessment by NGS although myeloid NGS is pending (ordered by clinician) and includes MYD88. Low level blood involvement by a systemic lymphoma cannot be excluded. If cell count abnormalities persist and remain unexplained, a bone marrow aspirate with additional associated genetic studies may be helpful in the evaluation of the process. Clinical correlation recommended.      Electronically signed by Anup Mercedes MD on 2/17/2025 at 1803 EST        By the signature on this report, the individual or group listed as making the Final Interpretation/Diagnosis certifies that they have reviewed this case and the staining reactivity of the antibodies and reagents in the analysis were determined to be acceptable. Diagnostic interpretation performed at [facility]   Cell Populations    Flow  Differential    Lymphocyte: 23 %                  CD3+CD4+: 59 % ; Polyclonal                                            CD3+CD8+: 23 % ; Polyclonal                                                        Natural Killer Cells: 10 %                               CD19+: 7 %                                          B Cell Light Chain Expression: Small monoclonal subset on polyclonal background                                                       Surface Kappa/Surface Lambda:      68:26 (total CD19)                                                                                      81:12 (CD19 dim subset, 0.1%)     Clonal B cells are CD19+ (dim), CD5-, CD10-, CD11c+, CD23-, CD25-, CD20+, CD79b+ (bright), and kappa+ (0.1%)                              Monocyte: 7 %   No immunophenotypic abnormality was detected.        Granulocyte: 65 %   No immunophenotypic abnormality was detected.            Flow Test Ordered  not established Acute Panel   Specimen Viability    Cell Count  not established x10*3/uL 5.80   Number of Cells Collected  not established per tube 100,000.00   Cytogenetics/Molecular Test Ordered  not established Yes   Comment:   Clinician ordered:  Myeloid NGS panel        Assessment/Plan    Assessment and Plan:   #1. Macrocytic anemia.  Currently her iron levels are good and she should continue oral iron.  The Anemia present may reflect the changes in flow cytometry and we discussed that these are not significant changes but need to be watched.    Patient on methotrexate, which is known to suppress bone marrow.   Chronic disease and inflammation can contribute to anemia such as arthtritis  #2. Leukopenia    Will check labs in 3 months and see her back in 6 months.      PMH of choledocholithiasis and chronic cholecystitis s/p recent laparoscopic cholecystectomy 10/11/24 c/b atrial flutter, RA, pancreatic cyst, HTN, chronic diastolic HF, KAROLINA, PFO per TTE 10/2024, hypothyroidism, GERD, chronic venous  insufficiency, remote breast CA, venous insufficiency, gout and rheumatoid arthritis.         Cholecystectomy performed in October 2024.  Hospitalization November 2024 for acute anaphylaxis.  Hospitalized February 17, 2025 for STEMI.  Stent placed now on Plavix and aspirin.  GI scopes planned. Positive blood occult found in 11/2024 during hospitalization.        Continue oral iron at this time.  Patient has rheumatoid arthritis and is on methotrexate which will contribute to anemia.    October 2024 infusions while inpatient.     Workup discussion:  Taking iron daily for years, likely not absorbing and would not want to increase dose due to constipation    Discussed results of workup.  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  Boody light chain 4.27 with positive ratio 1.69.  No specific immunophenotypic or morphologic abnormality.  However, we will monitor.  MYD 88 will be drawn to assess for genetic mutation.  Myeloid malignancy panel negative for JAK2 V6 17F, exon 12 mutation, CALR mutation or MPL mutation.  Negative for von Willebrand.  B12 above 500 she will take B12 and folic acid daily.  Negative CRP and sed rate slightly elevated at 52.  Likely this is inflammatory.  Patient is on methotrexate which can also cause myelosuppression.  SPEP negative.  While inpatient CBC shows hemoglobin of 9, RBCs 2.74, WBC 3.9 and NRBC 0.5.  We will repeat this blood work.        Follow up:     RTC:labs in 3 months and   -6 months with Yamile Briggs PA-C     Medications:  -Take B12/folic acid daily  -IV iron as needed        Referral(s):  -GI plan for colonoscopy in the summer        Diagnoses and all orders for this visit:  Symptomatic anemia  -     Clinic Appointment Request Follow up  -     CBC and Auto Differential; Future  -     Ferritin; Future  -     Iron and TIBC; Future  -     Vitamin B12; Future  -     CBC and Auto Differential; Future  -     Comprehensive Metabolic Panel;  Future  -     Ferritin; Future  -     Iron and TIBC; Future  -     Vitamin B12; Future  -     Slide Request; Future           Yamile Briggs PA-C                              [1] No family history on file.

## 2025-07-30 ENCOUNTER — OFFICE VISIT (OUTPATIENT)
Dept: CARDIOLOGY | Facility: CLINIC | Age: 89
End: 2025-07-30
Payer: MEDICARE

## 2025-07-30 VITALS
HEART RATE: 71 BPM | RESPIRATION RATE: 18 BRPM | BODY MASS INDEX: 24.6 KG/M2 | DIASTOLIC BLOOD PRESSURE: 70 MMHG | SYSTOLIC BLOOD PRESSURE: 123 MMHG | HEIGHT: 64 IN | WEIGHT: 144.1 LBS

## 2025-07-30 DIAGNOSIS — I87.2 VENOUS INSUFFICIENCY: Primary | ICD-10-CM

## 2025-07-30 PROCEDURE — 1160F RVW MEDS BY RX/DR IN RCRD: CPT | Performed by: INTERNAL MEDICINE

## 2025-07-30 PROCEDURE — 3074F SYST BP LT 130 MM HG: CPT | Performed by: INTERNAL MEDICINE

## 2025-07-30 PROCEDURE — 1159F MED LIST DOCD IN RCRD: CPT | Performed by: INTERNAL MEDICINE

## 2025-07-30 PROCEDURE — 99213 OFFICE O/P EST LOW 20 MIN: CPT | Performed by: INTERNAL MEDICINE

## 2025-07-30 PROCEDURE — 3078F DIAST BP <80 MM HG: CPT | Performed by: INTERNAL MEDICINE

## 2025-07-30 PROCEDURE — G2211 COMPLEX E/M VISIT ADD ON: HCPCS | Performed by: INTERNAL MEDICINE

## 2025-07-30 PROCEDURE — 1126F AMNT PAIN NOTED NONE PRSNT: CPT | Performed by: INTERNAL MEDICINE

## 2025-07-30 PROCEDURE — 99213 OFFICE O/P EST LOW 20 MIN: CPT

## 2025-07-30 ASSESSMENT — PAIN SCALES - GENERAL: PAINLEVEL_OUTOF10: 0-NO PAIN

## 2025-07-30 NOTE — PROGRESS NOTES
"OUTPATIENT FOLLOW-UP -  VASCULAR MEDICINE    DOS: 7/30/2025  Last seen:    12/4/2024    REQUESTING PHYSICIAN:  Zack Nieves MD  PCP - General    REASON FOR FOLLOW-UP:  here for follow up CVI and LDS     HISTORY OF PRESENT ILLNESS:     89 yo lady here for follow up CVI and LDS. Using the compression. Legs are doing well. Reports occasional sharp pain in the legs - can last for a while. Does not think this is a cramp. Is concerned there may be a blood clot. Reports in Feb has STEMi status post PCI to RCA with 2 overlapped drug-eluting stent on February 14, 2025. Now on plavix.     Follows with heme for anemia  \" We have arranged for IV iron to begin next week.   She continues on methotrexate for RA.  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   They are planning on getting MRCP to further evaluate pancreatic cysts.   Mammogram 2/11/25 normal.  CT of head 3/18/25 normal with exception to sinus issues. B12 was good 2/12/25.\"  Reports planned for colonoscopy.     REVIEW OF SYSTEMS:     weight is stable  No sores, ulcers, rashes, +skin lesions  + edema, no calf pain    PHYSICAL EXAMINATION:   /70 (BP Location: Right arm, Patient Position: Sitting)   Pulse 71   Resp 18   Ht 1.626 m (5' 4\")   Wt 65.4 kg (144 lb 1.6 oz)   BMI 24.73 kg/m²     Gen: Appears well, NAD  Ext: min edema, nontender  Skin: LDS and hemosiderin staining kush  Pulses: DP 1+  Mood and affect appropriate    ADDITIONAL DATA:   Wearing 15-20mmHg compression stockings today. Right calf: 32.5cm Left calf:  31.0cm    ASSESSMENT/PLAN:    here for follow up CVI and LDS - doing well. Continue the skin care. Continue the 15-20 mmHg stocking. Increase walking as able. Discussed considering a padded stocking for foot comfort. Follow up 1 year  "

## 2025-07-30 NOTE — PATIENT INSTRUCTIONS
ASSESSMENT/PLAN:    here for follow up CVI and LDS - doing well. Continue the skin care. Continue the 15-20 mmHg stocking. Increase walking as able. Discussed considering a padded stocking for foot comfort. Follow up 1 year

## 2025-08-04 ENCOUNTER — OFFICE VISIT (OUTPATIENT)
Facility: CLINIC | Age: 89
End: 2025-08-04
Payer: MEDICARE

## 2025-08-04 VITALS
OXYGEN SATURATION: 98 % | SYSTOLIC BLOOD PRESSURE: 122 MMHG | WEIGHT: 145 LBS | DIASTOLIC BLOOD PRESSURE: 60 MMHG | BODY MASS INDEX: 24.89 KG/M2 | HEART RATE: 70 BPM

## 2025-08-04 DIAGNOSIS — R94.31 ABNORMAL ELECTROCARDIOGRAM (ECG) (EKG): ICD-10-CM

## 2025-08-04 DIAGNOSIS — K21.9 GERD (GASTROESOPHAGEAL REFLUX DISEASE): ICD-10-CM

## 2025-08-04 DIAGNOSIS — E78.00 PURE HYPERCHOLESTEROLEMIA: ICD-10-CM

## 2025-08-04 DIAGNOSIS — Z95.5 HISTORY OF CORONARY ANGIOPLASTY WITH INSERTION OF STENT: Primary | ICD-10-CM

## 2025-08-04 PROCEDURE — 3074F SYST BP LT 130 MM HG: CPT | Performed by: INTERNAL MEDICINE

## 2025-08-04 PROCEDURE — 1159F MED LIST DOCD IN RCRD: CPT | Performed by: INTERNAL MEDICINE

## 2025-08-04 PROCEDURE — 99212 OFFICE O/P EST SF 10 MIN: CPT

## 2025-08-04 PROCEDURE — 3078F DIAST BP <80 MM HG: CPT | Performed by: INTERNAL MEDICINE

## 2025-08-04 PROCEDURE — 1036F TOBACCO NON-USER: CPT | Performed by: INTERNAL MEDICINE

## 2025-08-04 PROCEDURE — 1126F AMNT PAIN NOTED NONE PRSNT: CPT | Performed by: INTERNAL MEDICINE

## 2025-08-04 PROCEDURE — 99214 OFFICE O/P EST MOD 30 MIN: CPT | Performed by: INTERNAL MEDICINE

## 2025-08-04 ASSESSMENT — ENCOUNTER SYMPTOMS
OCCASIONAL FEELINGS OF UNSTEADINESS: 0
LOSS OF SENSATION IN FEET: 0
DEPRESSION: 0

## 2025-08-04 ASSESSMENT — LIFESTYLE VARIABLES: TOTAL SCORE: 0

## 2025-08-04 ASSESSMENT — PAIN SCALES - GENERAL: PAINLEVEL_OUTOF10: 0-NO PAIN

## 2025-08-04 NOTE — ASSESSMENT & PLAN NOTE
Angina free with no signs of heart failure, advised Mediterranean type low carbohydrate and low sugar dietary lifestyle and continue her statins and aspirin    Echocardiography in 6 months to reassess her heart muscle function which will be 1 year post MI

## 2025-08-04 NOTE — PROGRESS NOTES
[facility]        Subjective   Chief Complaint   Patient presents with    Establish Care      Previous: NSTEMI and cardiac catheterization showing moderate LAD disease and severe mid right coronary disease stenting with overlapping 3.5/30 and 3.5/12 mm drug-eluting stents with 0% residual stenosis. 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  Michell has a past surgical history that includes Total hip arthroplasty (Bilateral, 01/15/2014); Other surgical history (12/05/2022); Other surgical history (Bilateral, 12/05/2022); Total abdominal hysterectomy w/ bilateral salpingoophorectomy (12/10/2014); Breast lumpectomy (2000); Cardiac catheterization (N/A, 2/14/2025); and Cardiac catheterization (N/A, 2/14/2025).   No relevant family history has been documented for this patient.  Current Outpatient Medications   Medication Sig Dispense Refill    acetaminophen (Tylenol) 325 mg tablet Take 2 tablets (650 mg) by mouth every 6 hours if needed.      allopurinol (Zyloprim) 100 mg tablet Take 2 tablets (200 mg) by mouth once daily. 90 tablet 3    atorvastatin (Lipitor) 40 mg tablet Take 1 tablet (40 mg) by mouth once daily at bedtime. 90 tablet 3    clopidogrel (Plavix) 75 mg tablet Take 1 tablet (75 mg) by mouth once daily. Do not start before February 18, 2025. 90 tablet 3    cyanocobalamin (Vitamin B-12) 1,000 mcg tablet Take 1 tablet (1,000 mcg) by mouth once daily. 90 tablet 3    ergocalciferol (Vitamin D-2) 1.25 MG (99744 UT) capsule Take 1 capsule (1,250 mcg) by mouth every 30 (thirty) days.  Taking 2 times a month 24 capsule 1    estradiol (Estrace) 0.01 % (0.1 mg/gram) vaginal cream Insert 0.25 Applicatorfuls (1 g) into the vagina 2 times a week. 42.5 g 3    ferrous sulfate 325 (65 Fe) MG EC tablet Take 65 mg by mouth once daily with breakfast. Do not crush, chew, or split.      folic acid (Folvite) 1 mg tablet Take 1 tablet (1 mg) by mouth once daily.      gabapentin (Neurontin) 300 mg capsule Take 1 capsule (300 mg) by mouth once daily at bedtime. 90 capsule 3    levothyroxine (Synthroid, Levoxyl) 50 mcg tablet Take 1 tablet (50 mcg) by mouth early in the morning.. Take on an empty stomach at the same time each day, either 30 to 60 minutes prior to breakfast      methotrexate (Trexall) 2.5 mg tablet Take 8 tablets (20 mg total) by mouth 1 (one) time per week.  Follow directions carefully, and ask to explain any part you do not understand. Take exactly as directed. 96 tablet 3    metoprolol succinate XL (Toprol-XL) 25 mg 24 hr tablet Take 1 tablet (25 mg) by mouth once daily. Do not crush or chew. 90 tablet 3    omeprazole (PriLOSEC) 20 mg DR capsule Take 1 capsule (20 mg) by mouth early in the morning..      omeprazole OTC (PriLOSEC OTC) 20 mg EC tablet Take 1 tablet (20 mg) by mouth once daily in the morning. Take before meals. Do not crush, chew, or split.      polyethylene glycol (Glycolax, Miralax) 17 gram packet Take 17 g by mouth once daily.      potassium chloride CR 10 mEq ER tablet       torsemide (Demadex) 10 mg tablet Take 2 tablets (20 mg) by mouth once daily. 180 tablet 3    aspirin 81 mg chewable tablet Chew and swallow 1 tablet (81 mg) once daily. (Patient not taking: Reported on 4/11/2025) 90 tablet 0    furosemide (Lasix) 20 mg tablet Take 1 tablet (20 mg) by mouth early in the morning.. (Patient not taking: Reported on 8/4/2025)       No current facility-administered medications for this visit.      reports that Michell has never smoked. Michell has never used smokeless tobacco. Michell  reports that Michell does not currently use alcohol. Michell reports that Michell does not use drugs.  Allergies:  Apixaban, Xarelto [rivaroxaban], Doxycycline, Erythromycin, Erythromycin base, Methen-m.blue-s.phos-phsal-hyo, Naproxen, Penicillins, Prednisone, Pseudoephedrine, Tetracycline, Cephalosporins, and Red dye    ROS: See HPI  CONSTITUTIONAL: Chills- none. Fever- none. Weight change appropriate for age.  HEENT: Headache- Negative.  Change in vision- none.  Ear pain- none. Nasal congestion- none. Post-nasal drip-none.  Sore throat-none.  CARDIOLOGY: Chest pain- none.  Leg edema-trace.  Murmurs-soft systolic.  Palpitation- none.  RESPIRATORY: Denies any shortness of breath.  GI: Abdominal pain- none.  Change in bowel habits- none.  Constipation- none.  Diarrhea- none.  Nausea- none.  Vomiting- none.  MUSCULOSKELETAL: Joint pain- none.  Muscle aches- none.  DERMATOLOGY: Rash- none.  NEUROLOGY: Dizziness- none.   Headache- none.  PSYCHIATRY: Denies any depression or anxiety     Vitals:    08/04/25 1352   BP: 122/60   Pulse: 70   SpO2: 98%   Weight: 65.8 kg (145 lb)   PainSc: 0-No pain      BMI:Body mass index is 24.89 kg/m².   General Cardiology:  General Appearance: Alert, oriented and in no acute distress.  HEENT: extra ocular movements intact (EOMI), pupils equal,  round, reactive to light and accommodation (PERRLA).  Carotid Upstroke: no bruit, normal.  Jugular Venous Distention (JVD): flat.  Chest: normal.  Lungs: Clear to auscultation,   Heart Sounds: no S3 or S4, normal S1, S2, regular rate.  Murmur, Click, Gallop: soft systolic murmur.  Abdomen: no hepatomegaly, no masses felt, soft.  Extremities: no leg edema.  Peripheral pulses: 2 plus bilateral.  NEUROLOGY Cranial nerves II-XII grossly intact.     Last Labs:  CMP:  Recent Labs     07/01/25  1145 03/26/25  1544 03/18/25  0953 02/17/25  0522   * 134* 133* 137   K 4.5 3.6 3.5 4.2   CL 99 99 100 109*   CO2 21 27 24 24   ANIONGAP 12  --  13 8*   BUN 31* 26*  23 22   CREATININE 0.83 0.88 0.84 0.79   EGFR 67 63 67 72   GLUCOSE 125* 95 138* 91     Recent Labs     07/01/25  1145 03/26/25  1544 03/18/25  0953   ALBUMIN 3.9 4.0 3.4   ALKPHOS 130 135 111   ALT 24 18 15   AST 37 27 23   BILITOT 0.7 0.7 0.7     CBC:  Recent Labs     07/01/25  1145 03/26/25  1544 03/18/25  0953   WBC 7.9 5.9 4.3*   HGB 10.2* 8.8* 8.9*   HCT 30.3* 26.6* 27.3*    234 188   MCV 97 101.5* 102*     COAG:   Recent Labs     02/14/25 2128 02/12/25  1426   INR 1.2 1.0     HEME/ENDO:  Recent Labs     07/01/25  1145 03/26/25  1544 02/15/25  0409 02/12/25  1426 01/07/25  1210 11/21/24  1457 08/28/24  0250 03/25/24  1016 07/27/23  0431 03/22/23  1806 03/04/20  1040 03/14/19  1306   FERRITIN 606* 61  --  236*   < >  --    < >  --   --   --    < >  --    IRONSAT 30 11*  --  21*   < >  --    < > 30   < > 24*   < >  --    TSH  --   --   --   --   --  2.48  --  2.51  --  1.62   < >  --    HGBA1C  --   --  6.0*  --   --   --   --   --   --   --   --  5.4    < > = values in this interval not displayed.      CARDIAC:   Recent Labs     03/18/25  1122 03/18/25  0953 02/14/25 2128 02/12/25  1426   LDH  --   --   --  175   TROPHS 28* 29* 374*  --      Recent Labs     02/15/25  0409 03/25/24  1016 03/22/23  1806 03/24/22  1020 03/17/21  1134   CHOL 155 220* 196 191 214*   LDLF  --   --  112* 104* 126*   LDLCALC 83 127*  --   --   --    HDL 55.9 69.8 60.0 62.8 65.3   TRIG 83 114 121 121 113       Last Cardiology Tests:  Echo:  Echo Results:  Transthoracic Echo (TTE) Complete 02/15/2025    [place] [id number]  Phone [phone]    TRANSTHORACIC ECHOCARDIOGRAM REPORT    Patient Name:       JOEL Solorio Physician:    [id number] Lay DO  Study Date:         2/15/2025            Ordering Provider:    Luis LAY  MRN/PID:            [id number]             Fellow:  Accession#:         [id number]         Nurse:  Date of Birth/Age:   1935 / 89 years Sonographer:          Rashida Sims RDCS  Gender Assigned at  F                    Additional Staff:  Birth:  Height:             162.00 cm            Admit Date:  Weight:             61.00 kg             Admission Status:     Inpatient -  Routine  BSA / BMI:          1.65 m2 / 23.24      Department Location:  [facility]  kg/m2  Blood Pressure: 130 /66 mmHg    Study Type:    TRANSTHORACIC ECHO (TTE) COMPLETE  Diagnosis/ICD: ST elevation (STEMI) myocardial infarction of unspecified  site-I21.3  Indication:    stemi  CPT Codes:     Echo Complete w Full Doppler-[id number]    Patient History:  Pertinent History: A-Fib, HTN and Cancer. stemi/stent, RA, breast ca, blank, IWMI,  hip sx.    Study Detail: The following Echo studies were performed: 2D, M-Mode, Doppler and  color flow. Technically challenging study due to prominent lung  artifact.      PHYSICIAN INTERPRETATION:  Left Ventricle: Left ventricular ejection fraction is normal, by visual estimate at 55-60%. Wall motion is abnormal. The left ventricular cavity size is normal. There is normal septal and normal posterior left ventricular wall thickness. Spectral Doppler shows a Grade I (impaired relaxation pattern) of left ventricular diastolic filling with normal left atrial filling pressure. Mild inferior wall hypokinesis.  Left Atrium: The left atrial size is normal.  Right Ventricle: The right ventricle is normal in size. There is normal right ventricular global systolic function.  Right Atrium: The right atrial size is normal.  Aortic Valve: The aortic valve appears abnormal. There is mild aortic valve cusp calcification. There is evidence of mildly elevated transaortic gradients consistent with sclerosis of the aortic valve.  The aortic valve dimensionless index is 0.59. There is no evidence of aortic valve regurgitation. The peak instantaneous gradient of the aortic valve is 11 mmHg. The mean gradient of the aortic valve is 6 mmHg.  Mitral  Valve: The mitral valve is abnormal. There is moderate mitral annular calcification. There is mild mitral valve regurgitation.  Tricuspid Valve: The tricuspid valve is structurally normal. There is moderate tricuspid regurgitation. The Doppler estimated RVSP is mild to moderately elevated at 43.5 mmHg.  Pulmonic Valve: The pulmonic valve is structurally normal. There is trace to mild pulmonic valve regurgitation.  Pericardium: No pericardial effusion noted.  Aorta: The aortic root is normal.      CONCLUSIONS:  1. Left ventricular ejection fraction is normal, by visual estimate at 55-60%.  2. Abnormal wall motion.  3. Spectral Doppler shows a Grade I (impaired relaxation pattern) of left ventricular diastolic filling with normal left atrial filling pressure.  4. There is normal right ventricular global systolic function.  5. There is moderate mitral annular calcification.  6. Mild to moderately elevated right ventricular systolic pressure.  7. Moderate tricuspid regurgitation.  8. Aortic valve sclerosis.    QUANTITATIVE DATA SUMMARY:    2D MEASUREMENTS:             Normal Ranges:  LAs:             3.10 cm     (2.7-4.0cm)  IVSd:            0.71 cm     (0.6-1.1cm)  LVPWd:           0.69 cm     (0.6-1.1cm)  LVIDd:           4.29 cm     (3.9-5.9cm)  LV Mass Index:   53.3 g/m2  LVEDV Index:     48.38 ml/m2      LEFT ATRIUM:                  Normal Ranges:  LA Vol A4C:        37.1 ml    (22+/-6mL/m2)  LA Vol A2C:        29.7 ml  LA Vol BP:         36.6 ml  LA Vol Index A4C:  22.5ml/m2  LA Vol Index A2C:  18.0 ml/m2  LA Vol Index BP:   22.2 ml/m2  LA Area A4C:       14.9 cm2  LA Area A2C:       12.1 cm2  LA Major Axis A4C: 5.1 cm  LA Major Axis A2C: 4.2 cm  LA Volume Index:   19.4 ml/m2  LA Vol A4C:        34.1 ml  LA Vol A2C:        24.8 ml  LA Vol Index BSA:  17.9 ml/m2      LV SYSTOLIC FUNCTION:  Normal Ranges:  EF-A4C View:    53 % (>=55%)  EF-A2C View:    60 %  EF-Biplane:     57 %  EF-Visual:      58 %  LV EF  Reported: 58 %      LV DIASTOLIC FUNCTION:           Normal Ranges:  MV Peak E:             0.92 m/s  (0.7-1.2 m/s)  MV Peak A:             1.18 m/s  (0.42-0.7 m/s)  E/A Ratio:             0.78      (1.0-2.2)  MV e'                  0.080 m/s (>8.0)  MV lateral e'          0.09 m/s  MV medial e'           0.07 m/s  E/e' Ratio:            11.42     (<8.0)  a'                     0.09 m/s      MITRAL VALVE:          Normal Ranges:  MV DT:        197 msec (150-240msec)      AORTIC VALVE:                      Normal Ranges:  AoV Vmax:                1.68 m/s  (<=1.7m/s)  AoV Peak P.3 mmHg (<20mmHg)  AoV Mean P.0 mmHg  (1.7-11.5mmHg)  LVOT Max Amilcar:            1.01 m/s  (<=1.1m/s)  AoV VTI:                 38.50 cm  (18-25cm)  LVOT VTI:                22.90 cm  LVOT Diameter:           2.00 cm   (1.8-2.4cm)  AoV Area, VTI:           1.87 cm2  (2.5-5.5cm2)  AoV Area,Vmax:           1.89 cm2  (2.5-4.5cm2)  AoV Dimensionless Index: 0.59      RIGHT VENTRICLE:  TAPSE: 20.4 mm  RV s'  0.13 m/s      TRICUSPID VALVE/RVSP:          Normal Ranges:  Peak TR Velocity:     2.67 m/s  RV Syst Pressure:     44 mmHg  (< 30mmHg)  IVC Diam:             2.06 cm      PULMONIC VALVE:          Normal Ranges:  PV Accel Time:  158 msec (>120ms)  PV Max Amilcar:     1.2 m/s  (0.6-0.9m/s)  PV Max P.2 mmHg      [id number] Robin Enriquez DO  Electronically signed on 2/15/2025 at 4:41:33 PM        ** Final **     Cath:  Stress Test:  Stress Results:  Nuclear Stress Test 10/15/2024    Narrative  * * *Final Report* * *    DATE OF EXAM: Oct 15 2024  1:54PM    N   0006  -  NM CARDIAC PERF STRESS/PHARM  / ACCESSION #  [id number]    PROCEDURE REASON: Chest pain/anginal equiv, intermediate CAD risk, not  treadmill candidate    * * * * Physician Interpretation * * * *    RESULT: PATIENT:  Name: MRS. JOEL BATRES  MRN: [id number]  Age: 89 years  Gender: F    CONCLUSIONS:  1. SPECT Perfusion Study: Normal.  2. There is no  scintigraphic evidence for inducible ischemia.  3. No evidence of scarred myocardium.  4. Left ventricle is normal in size. The left ventricle systolic  function is normal.  5. Right ventricle is normal in size. The right ventricle systolic  function is normal.  6. This is a low risk scan.    Gated Stress FBP Gated Rest FBP  LVEF % 72               71        Prior Study Comparison No prior nuclear cardiology exam available for  comparison.  Nuclear Med Report:1-Day Gated SPECT Myocardial Perfusion with  Regadenoson Stress: Myocardial perfusion imaging was performed at rest 30  minutes following the IV injection of the radiotracer. The patient  received 0.4 mg of regadenoson, via rapid IV push, immediately followed  by radiotracer IV. Gated post stress tomographic imaging was performed 30  to 60 minutes later. See administered radiotracer and doses below.  [facility]  Date of service: 10/15/2024 11:37:29 AM  Accession #: [id number]  Ordering Physician: DIANA ALY.  Requesting Physician: DIANA ALY  Indication: CP - ECG uniterpretable OR unable to exercise  Interpreting physician: Fabian Quinn MD  Height: 162.56 cm BSA: 1.72 m²  Weight: 65.77 kg  BMI: 24.9 kg/m²    Exam Type:        Rest                          Stress  Radiopharm: Tc-99m Tetrofosmin             Tc-99m Tetrofosmin  Dosage(mCi):         10                            29.8  Atten Correction:   not performed                   not performed  Stress Agent:                      Regadenoson 0.4mg Supply provided  from  [facility]      Resting Blood Press: 143/73 mmHg      Image Quality  The overall study imaging quality was deemed to be good.    FINDINGS:  Left Ventricle  Wall Motion:  Stress IR:3D - All segments are normal.  Rest IR:3D -  Gated Stress FBP -  Gated Rest FBP -  Reversibility -    Stress IR:3D      Stress IR:3D Gated Stress FBP Gated Rest FBP  LVEF:                    72 %            71 %  ED Volume:                   60 ml            59 ml  ES Volume:                   17 ml           17 ml  TID:     1.00      Perfusion Findings  Stress IR:3D - Summed Score=0  All segments demonstrate normal perfusion.  Rest IR:3D - Summed Score=0  All segments demonstrate normal perfusion.  Stress IR:3D   Rest IR:3D  Summed Score=0 Summed Score=0        LEFT VENTRICLE  The left ventricle is normal in size. Left ventricular systolic function  is normal.      Right Ventricle  The right ventricle is normal in size. Right ventricle systolic function  is normal.    Stress Test Findings:  There is no scintigraphic evidence for inducible ischemia. There is no  evidence of scarring.    The left ventricular cavity size is unchanged with stress.              Electronically signed by Fabian Quinn MD on 10/15/2024 at 3:06:43 PM        *  *  *  Final  *  *  *    --------------------------------------------------        Stress ECG Report:  [facility]  Date of service: 10/15/2024 11:37:29 AM  Accession #: [id number]  Ordering physician: DIANA ALY  Exercise specialist: Estela Sims  Assistant: Kelsi Soliman  Interpreting physician: Bill Bose MD  Patient name: MRS. JOEL BATRES  MRN: [id number]  Age: 89 years  Gender: F    Height: 162.56 cm BSA: 1.72 m²  Weight: 65.77 kg  BMI: 24.9 kg/m²    Indication: Chest pressure / Chest tightness    Stress ECG Conclusion:  Conclusion: Normal    Stress ECG Summary:  The patient's resting heart rate was 75 bpm and blood pressure was 143/73  mmHg. The patient received regadenoson 0.4 mg IVP over approximately 15  seconds followed immediately by injection of nuclear isotope. The test  was terminated due to end of protocol. No symptoms provoked during  stress. The maximum heart rate was 86 bpm, which is 65% of the predicted  heart rate for age. Peak blood pressure was 138/55 mmHg. The double  product achieved was 28027.    Medications:  Last Used  ALLOPURINOL   1 Days  ELIQUIS       1 Days  CARDIZEM      1  Days  GABAPENTIN    1 Days  HYDRALAZINE   1 Days  MECLIZINE HCL 1 Days  PROTONIX      1 Days      Resting ECG: Normal Sinus Rhythm  Symptoms at rest: No symptoms    Pharamcologic Protocol: Regadenoson    Stress Exercise Table:  +------+----------+--+---+---+  Stage Time (min)HRSYSDIA  +------+----------+--+---+---+  1     1.0       82        +------+----------+--+---+---+  2     1.2       [id number]   +------+----------+--+---+---+    +-----+--+---+---+       HRSYSDIA  +-----+--+---+---+  [id number]   +-----+--+---+---+      Recovery Table:  +------+----------+--+---+---+  Stage Time (min)HRSYSDIA  +------+----------+--+---+---+  1     0.5       86        +------+----------+--+---+---+  2     2.5       [id number]   +------+----------+--+---+---+  3     4.5       [id number]   +------+----------+--+---+---+  4     5.1       85        +------+----------+--+---+---+    Stress Observations:    Resting HR: 75 bpm  Peak HR: 86 bpm (65% MPHR)  Resting BP: 143 / 73 mmHg  Peak BP: 138 / 55 mmHg  Rate Pressure Product (RPP): 37626  Stress Exercise Observations:  Reason for test termination: end of protocol,  Symptoms during test: No symptoms provoked during stress,  Blood pressure response: Resting hypertension,  ST segment and T wave changes: No ST changes and  Arrhythmias: No arrhythmias  Comments: Dr. Bose providing direct supervision during test    Metabolic Exercise Data  Variable: Observed value [Expected Range]      HGI: 0.1 [>1.06 bpm/mmHg]            Electronically signed by Bill Bose MD on 10/15/2024 at 2:57:40 PM      *  *  *  Final  *  *  *    --------------------------------------------------      Stress Supervisor Report:  [facility]  Date of service: 10/15/2024 11:37:29 AM  Accession #: [id number]    Supervising physician: Bill Bose MD    PATIENT:  Name: MRS. JOEL BATRES  MRN: [id number]  Age: 89 years  Gender: F    The supervising physician was  in the department and immediately available.    Electronically signed by Bill Bose MD on 10/15/2024 at 2:57:43 PM      *  *  *  Final  *  *  *    RP        Transcribed Using Voice Recognition  Transcribe Date/Time: Oct 15 2024 11:37A    Dictated by: ROBERTA VALENZUELA MD    This examination was interpreted and the report reviewed and  electronically signed by:  ROBERTA VALENZUELA MD on Oct 15 2024  3:06PM  EST     Cardiac Imaging:    Problem List Items Addressed This Visit       GERD (gastroesophageal reflux disease)    Adequately controlled on her current medication         History of coronary angioplasty with insertion of stent - Primary    Angina free with no signs of heart failure, advised Mediterranean type low carbohydrate and low sugar dietary lifestyle and continue her statins and aspirin    Cardiography in 6 months to reassess her heart muscle function which will be 1 year post MI         Pure hypercholesterolemia    On statin therapy at high dose and Mediterranean dietary lifestyle           Follow-up with me in 6 months    Pt. care time is spent includes review of diagnostic tests, labs, radiographs, EKGs, old echoes, cardiac work-up and coordination of care. Assessment, impression and plans are reflected in the note above as well as the orders.    Robin Enriquez,   [facility]  [facility]

## 2025-08-21 ENCOUNTER — OFFICE VISIT (OUTPATIENT)
Dept: OBSTETRICS AND GYNECOLOGY | Facility: CLINIC | Age: 89
End: 2025-08-21
Payer: MEDICARE

## 2025-08-21 VITALS
BODY MASS INDEX: 23.9 KG/M2 | DIASTOLIC BLOOD PRESSURE: 74 MMHG | WEIGHT: 140 LBS | HEIGHT: 64 IN | SYSTOLIC BLOOD PRESSURE: 148 MMHG | HEART RATE: 84 BPM

## 2025-08-21 DIAGNOSIS — N81.11 MIDLINE CYSTOCELE: Primary | ICD-10-CM

## 2025-08-21 LAB
POC APPEARANCE, URINE: CLEAR
POC BILIRUBIN, URINE: NEGATIVE
POC BLOOD, URINE: ABNORMAL
POC COLOR, URINE: YELLOW
POC GLUCOSE, URINE: NEGATIVE MG/DL
POC KETONES, URINE: NEGATIVE MG/DL
POC LEUKOCYTES, URINE: NEGATIVE
POC NITRITE,URINE: NEGATIVE
POC PH, URINE: 7 PH
POC PROTEIN, URINE: NEGATIVE MG/DL
POC SPECIFIC GRAVITY, URINE: 1.01
POC UROBILINOGEN, URINE: 0.2 EU/DL

## 2025-08-21 PROCEDURE — 1125F AMNT PAIN NOTED PAIN PRSNT: CPT | Performed by: OBSTETRICS & GYNECOLOGY

## 2025-08-21 PROCEDURE — 3077F SYST BP >= 140 MM HG: CPT | Performed by: OBSTETRICS & GYNECOLOGY

## 2025-08-21 PROCEDURE — 1159F MED LIST DOCD IN RCRD: CPT | Performed by: OBSTETRICS & GYNECOLOGY

## 2025-08-21 PROCEDURE — 99213 OFFICE O/P EST LOW 20 MIN: CPT | Performed by: OBSTETRICS & GYNECOLOGY

## 2025-08-21 PROCEDURE — 3078F DIAST BP <80 MM HG: CPT | Performed by: OBSTETRICS & GYNECOLOGY

## 2025-08-21 PROCEDURE — 81003 URINALYSIS AUTO W/O SCOPE: CPT | Mod: QW | Performed by: OBSTETRICS & GYNECOLOGY

## 2025-08-21 ASSESSMENT — ENCOUNTER SYMPTOMS
RESPIRATORY NEGATIVE: 1
PSYCHIATRIC NEGATIVE: 1
NEUROLOGICAL NEGATIVE: 1
ENDOCRINE NEGATIVE: 1
EYES NEGATIVE: 1
CONSTITUTIONAL NEGATIVE: 1
CARDIOVASCULAR NEGATIVE: 1
MUSCULOSKELETAL NEGATIVE: 1
CONSTIPATION: 1

## 2025-08-21 ASSESSMENT — PAIN SCALES - GENERAL: PAINLEVEL_OUTOF10: 1

## 2025-09-04 ENCOUNTER — HOSPITAL ENCOUNTER (OUTPATIENT)
Dept: CARDIOLOGY | Facility: HOSPITAL | Age: 89
Discharge: HOME | End: 2025-09-04
Payer: MEDICARE

## 2025-09-04 VITALS — DIASTOLIC BLOOD PRESSURE: 74 MMHG | SYSTOLIC BLOOD PRESSURE: 148 MMHG

## 2025-09-04 DIAGNOSIS — R94.31 ABNORMAL ELECTROCARDIOGRAM (ECG) (EKG): ICD-10-CM

## 2025-09-04 DIAGNOSIS — E78.00 PURE HYPERCHOLESTEROLEMIA: ICD-10-CM

## 2025-09-04 DIAGNOSIS — K21.9 GERD (GASTROESOPHAGEAL REFLUX DISEASE): ICD-10-CM

## 2025-09-04 DIAGNOSIS — Z95.5 HISTORY OF CORONARY ANGIOPLASTY WITH INSERTION OF STENT: ICD-10-CM

## 2025-09-04 LAB
AORTIC VALVE MEAN GRADIENT: 6 MMHG
AORTIC VALVE PEAK VELOCITY: 1.73 M/S
AV PEAK GRADIENT: 12 MMHG
AVA (PEAK VEL): 2 CM2
AVA (VTI): 2.03 CM2
EJECTION FRACTION APICAL 4 CHAMBER: 67.5
EJECTION FRACTION: 63 %
LEFT ATRIUM VOLUME AREA LENGTH INDEX BSA: 40.6 ML/M2
LEFT VENTRICLE INTERNAL DIMENSION DIASTOLE: 3.8 CM (ref 3.5–6)
LEFT VENTRICULAR OUTFLOW TRACT DIAMETER: 2 CM
LV EJECTION FRACTION BIPLANE: 67 %
MITRAL VALVE E/A RATIO: 0.79
RIGHT VENTRICLE PEAK SYSTOLIC PRESSURE: 37 MMHG
TRICUSPID ANNULAR PLANE SYSTOLIC EXCURSION: 2.2 CM

## 2025-09-04 PROCEDURE — 93306 TTE W/DOPPLER COMPLETE: CPT

## 2025-09-04 PROCEDURE — 93306 TTE W/DOPPLER COMPLETE: CPT | Performed by: INTERNAL MEDICINE

## 2026-02-23 ENCOUNTER — APPOINTMENT (OUTPATIENT)
Dept: OBSTETRICS AND GYNECOLOGY | Facility: CLINIC | Age: OVER 89
End: 2026-02-23
Payer: MEDICARE

## (undated) DEVICE — SUTURE, MONOCRYL, 4-0, 18 IN, PS2, UNDYED

## (undated) DEVICE — SPONGE GAUZE, XRAY RFD, 8X4 12 PLY

## (undated) DEVICE — SOLUTION, INJECTION, SODIUM CHLORIDE 9%, 500ML

## (undated) DEVICE — MICROINTRODUCER KIT, VSI, 4FR X 40CM, STIFFEN

## (undated) DEVICE — GLOVE, SURGICAL, PROTEXIS PI , 7.5, PF, LF

## (undated) DEVICE — CATHETER, BALLOON, NC EUPHORA NONCOMPLIANT 3.5 X 12 X 142CM

## (undated) DEVICE — SHEATH, PINNACLE, W/.038 GUIDEWIRE, 10 CM,  6FR INTRODUCER, 6FR DIA, 2.5 CM DIALATOR

## (undated) DEVICE — INFLATION KIT, ADVANTAGE, ENCORE 26 (1/BOX)

## (undated) DEVICE — CATHETER, EXPO, MODEL-D, 6FR FL4

## (undated) DEVICE — CATHETER, DIAGNOSTIC, 6 FR-145 ANGLE INFINITI PIG

## (undated) DEVICE — PACK, ANGIO P2, CUSTOM, LAKE

## (undated) DEVICE — GLOVE, SURGICAL, PROTEXIS PI BLUE W/NEUTHERA, 8.0, PF, LF

## (undated) DEVICE — CLOSURE DEVICE, VASCULAR, ANGIO-SEAL, VIP, 6FR, LF

## (undated) DEVICE — GUIDEWIRE, PT2 MODERATE SUPPORT, 185CM, J-TIP

## (undated) DEVICE — CATHETER, VISTA BRITE TIP GUIDE, 6FR 0.070 JR4 ECO

## (undated) DEVICE — Device

## (undated) DEVICE — KIT, NAMIC STANDARD LEFT HEART, CUSTOM, LWMC

## (undated) DEVICE — PREP, SKIN, CHLORHEXIDINE GLUCONATE, 2%, 1 QT

## (undated) DEVICE — CATHETER, BALLOON DILATION, EUPHORA SEMICOMPLIANT 2.50  X 15 MM X 142CM

## (undated) DEVICE — GUIDEWIRE, J TIP, 3 MM, 0.035 IN X 150 CM, PTFE

## (undated) DEVICE — TRAY, DRY PREP, PREMIUM